# Patient Record
Sex: MALE | Race: WHITE | Employment: OTHER | URBAN - METROPOLITAN AREA
[De-identification: names, ages, dates, MRNs, and addresses within clinical notes are randomized per-mention and may not be internally consistent; named-entity substitution may affect disease eponyms.]

---

## 2020-01-31 ENCOUNTER — HOSPITAL ENCOUNTER (OUTPATIENT)
Dept: NON INVASIVE DIAGNOSTICS | Age: 65
Discharge: HOME | End: 2020-01-31
Attending: INTERNAL MEDICINE
Payer: COMMERCIAL

## 2020-01-31 VITALS
OXYGEN SATURATION: 93 % | BODY MASS INDEX: 42.66 KG/M2 | HEART RATE: 72 BPM | DIASTOLIC BLOOD PRESSURE: 73 MMHG | WEIGHT: 315 LBS | SYSTOLIC BLOOD PRESSURE: 113 MMHG | RESPIRATION RATE: 19 BRPM | HEIGHT: 72 IN

## 2020-01-31 DIAGNOSIS — I48.91 ATRIAL FIBRILLATION, UNSPECIFIED TYPE (HCC): ICD-10-CM

## 2020-01-31 LAB
ATRIAL RATE: 71 BPM
CALCULATED P AXIS, ECG09: 67 DEGREES
CALCULATED R AXIS, ECG10: 9 DEGREES
CALCULATED T AXIS, ECG11: 61 DEGREES
DIAGNOSIS, 93000: NORMAL
P-R INTERVAL, ECG05: 210 MS
Q-T INTERVAL, ECG07: 418 MS
QRS DURATION, ECG06: 100 MS
QTC CALCULATION (BEZET), ECG08: 454 MS
VENTRICULAR RATE, ECG03: 71 BPM

## 2020-01-31 PROCEDURE — 92960 CARDIOVERSION ELECTRIC EXT: CPT

## 2020-01-31 PROCEDURE — 77030018729 HC ELECTRD DEFIB PAD CARD -B

## 2020-01-31 PROCEDURE — 99152 MOD SED SAME PHYS/QHP 5/>YRS: CPT

## 2020-01-31 PROCEDURE — 74011250636 HC RX REV CODE- 250/636: Performed by: INTERNAL MEDICINE

## 2020-01-31 PROCEDURE — 93005 ELECTROCARDIOGRAM TRACING: CPT

## 2020-01-31 RX ORDER — FENTANYL CITRATE 50 UG/ML
12.5-5 INJECTION, SOLUTION INTRAMUSCULAR; INTRAVENOUS
Status: DISCONTINUED | OUTPATIENT
Start: 2020-01-31 | End: 2020-01-31

## 2020-01-31 RX ORDER — LISINOPRIL 40 MG/1
40 TABLET ORAL DAILY
COMMUNITY

## 2020-01-31 RX ORDER — MIDAZOLAM HYDROCHLORIDE 1 MG/ML
.5-2 INJECTION, SOLUTION INTRAMUSCULAR; INTRAVENOUS
Status: DISCONTINUED | OUTPATIENT
Start: 2020-01-31 | End: 2020-01-31

## 2020-01-31 RX ORDER — ALBUTEROL SULFATE 90 UG/1
2 AEROSOL, METERED RESPIRATORY (INHALATION)
COMMUNITY

## 2020-01-31 RX ORDER — NEBIVOLOL 10 MG/1
10 TABLET ORAL DAILY
Status: ON HOLD | COMMUNITY
End: 2022-10-20

## 2020-01-31 RX ORDER — DOXAZOSIN 2 MG/1
2 TABLET ORAL DAILY
COMMUNITY

## 2020-01-31 RX ADMIN — FENTANYL CITRATE 25 MCG: 50 INJECTION, SOLUTION INTRAMUSCULAR; INTRAVENOUS at 08:18

## 2020-01-31 RX ADMIN — MIDAZOLAM 1 MG: 1 INJECTION INTRAMUSCULAR; INTRAVENOUS at 08:18

## 2020-01-31 RX ADMIN — MIDAZOLAM 1 MG: 1 INJECTION INTRAMUSCULAR; INTRAVENOUS at 08:12

## 2020-01-31 RX ADMIN — FENTANYL CITRATE 25 MCG: 50 INJECTION, SOLUTION INTRAMUSCULAR; INTRAVENOUS at 08:11

## 2020-01-31 RX ADMIN — MIDAZOLAM 2 MG: 1 INJECTION INTRAMUSCULAR; INTRAVENOUS at 08:11

## 2020-01-31 RX ADMIN — MIDAZOLAM 2 MG: 1 INJECTION INTRAMUSCULAR; INTRAVENOUS at 08:14

## 2020-01-31 NOTE — DISCHARGE INSTRUCTIONS
DISCHARGE SUMMARY       The following personal items collected during your admission are returned to you:  Clothing:  shirt   Albuterol inhaler    PATIENT INSTRUCTIONS: Continue taking all the same medications as previously prescribed. Call if any problems with medications. The cardioversion procedure can cause redness to the skin where the patches were placed. You may treat this with Aloe or Cortisone cream over the counter lotion. If the skin appears very reddened or blistered contact your physician    May return to work Monday 2/3/2020. Call to make an appointment with Dr. Kayley Ivory in 2 weeks. What to do at Home:  Recommended activity: No driving today      The discharge information has been reviewed with the PATIENT/wife . The PATIENT  verbalized understanding.

## 2020-01-31 NOTE — PROGRESS NOTES
Pt sedated with 6mg Versed and 50mcg Fentanyl for Cardioversion, given 1 synchronized shock(s) at 360 Joules, Afib converted to NSR . (monitored sedation from 0810 to 0830)

## 2020-01-31 NOTE — PROGRESS NOTES
Patient arrived to Non-Invasive Cardiology Lab for Out Patient Cardioversion Procedure. Staff introduced to patient. Patient identifiers verified with Name and Date of Birth. Procedure verified with patient. Consent forms reviewed and signed by patient or authorized representative and verified. Allergies verified. Patient informed of procedure and plan of care. Questions answered with review. Patient on cardiac monitor, non-invasive blood pressure, SPO2 monitor. On 2L NC. Patient is A&Ox3. Patient reports no complaints. Patient on stretcher, in low position, with side rails up. Patient instructed to call for assistance as needed. Family in waiting room.

## 2022-02-11 ENCOUNTER — HOSPITAL ENCOUNTER (OUTPATIENT)
Dept: PREADMISSION TESTING | Age: 67
Discharge: HOME | End: 2022-02-11
Payer: MEDICARE

## 2022-02-11 LAB
SARS-COV-2, XPLCVT: NOT DETECTED
SOURCE, COVRS: NORMAL

## 2022-02-11 PROCEDURE — U0005 INFEC AGEN DETEC AMPLI PROBE: HCPCS

## 2022-02-15 ENCOUNTER — ANESTHESIA (OUTPATIENT)
Dept: CARDIAC CATH/INVASIVE PROCEDURES | Age: 67
End: 2022-02-15
Payer: MEDICARE

## 2022-02-15 ENCOUNTER — ANESTHESIA EVENT (OUTPATIENT)
Dept: CARDIAC CATH/INVASIVE PROCEDURES | Age: 67
End: 2022-02-15
Payer: MEDICARE

## 2022-02-15 ENCOUNTER — APPOINTMENT (OUTPATIENT)
Dept: CARDIAC CATH/INVASIVE PROCEDURES | Age: 67
End: 2022-02-15
Attending: INTERNAL MEDICINE
Payer: MEDICARE

## 2022-02-15 ENCOUNTER — HOSPITAL ENCOUNTER (OUTPATIENT)
Age: 67
Discharge: HOME | End: 2022-02-16
Attending: INTERNAL MEDICINE | Admitting: INTERNAL MEDICINE
Payer: MEDICARE

## 2022-02-15 DIAGNOSIS — I48.91 ATRIAL FIBRILLATION, UNSPECIFIED TYPE (HCC): ICD-10-CM

## 2022-02-15 PROBLEM — Z86.79 S/P ABLATION OF ATRIAL FIBRILLATION: Status: ACTIVE | Noted: 2022-02-15

## 2022-02-15 PROBLEM — Z98.890 S/P ABLATION OF ATRIAL FIBRILLATION: Status: ACTIVE | Noted: 2022-02-15

## 2022-02-15 LAB
ACT BLD: 142 SECS (ref 79–138)
ACT BLD: 220 SECS (ref 79–138)
ACT BLD: 243 SECS (ref 79–138)
ACT BLD: 261 SECS (ref 79–138)
ACT BLD: 267 SECS (ref 79–138)

## 2022-02-15 PROCEDURE — C1893 INTRO/SHEATH, FIXED,NON-PEEL: HCPCS | Performed by: INTERNAL MEDICINE

## 2022-02-15 PROCEDURE — 77030035291 HC TBNG PMP SMARTABLATE J&J -B: Performed by: INTERNAL MEDICINE

## 2022-02-15 PROCEDURE — 77030041009 HC ADTR CBL EP DX J&J -D: Performed by: INTERNAL MEDICINE

## 2022-02-15 PROCEDURE — 85347 COAGULATION TIME ACTIVATED: CPT

## 2022-02-15 PROCEDURE — C1894 INTRO/SHEATH, NON-LASER: HCPCS | Performed by: INTERNAL MEDICINE

## 2022-02-15 PROCEDURE — C1730 CATH, EP, 19 OR FEW ELECT: HCPCS | Performed by: INTERNAL MEDICINE

## 2022-02-15 PROCEDURE — C1759 CATH, INTRA ECHOCARDIOGRAPHY: HCPCS | Performed by: INTERNAL MEDICINE

## 2022-02-15 PROCEDURE — 74011250637 HC RX REV CODE- 250/637: Performed by: INTERNAL MEDICINE

## 2022-02-15 PROCEDURE — 77010033678 HC OXYGEN DAILY

## 2022-02-15 PROCEDURE — 77030026438 HC STYL ET INTUB CARD -A: Performed by: ANESTHESIOLOGY

## 2022-02-15 PROCEDURE — 93312 ECHO TRANSESOPHAGEAL: CPT

## 2022-02-15 PROCEDURE — 77030027107 HC PTCH EXT REF CARTO3 J&J -F: Performed by: INTERNAL MEDICINE

## 2022-02-15 PROCEDURE — 77030008684 HC TU ET CUF COVD -B: Performed by: ANESTHESIOLOGY

## 2022-02-15 PROCEDURE — 74011250636 HC RX REV CODE- 250/636: Performed by: INTERNAL MEDICINE

## 2022-02-15 PROCEDURE — 74011250636 HC RX REV CODE- 250/636: Performed by: NURSE ANESTHETIST, CERTIFIED REGISTERED

## 2022-02-15 PROCEDURE — 74011000258 HC RX REV CODE- 258: Performed by: NURSE ANESTHETIST, CERTIFIED REGISTERED

## 2022-02-15 PROCEDURE — 77030010880 HC CBL EP SUPRME STJU -C: Performed by: INTERNAL MEDICINE

## 2022-02-15 PROCEDURE — 77030005513 HC CATH URETH FOL11 MDII -B: Performed by: INTERNAL MEDICINE

## 2022-02-15 PROCEDURE — 93621 COMP EP EVL L PAC&REC C SINS: CPT | Performed by: INTERNAL MEDICINE

## 2022-02-15 PROCEDURE — 76210000006 HC OR PH I REC 0.5 TO 1 HR

## 2022-02-15 PROCEDURE — 76060000038 HC ANESTHESIA 3.5 TO 4 HR: Performed by: INTERNAL MEDICINE

## 2022-02-15 PROCEDURE — C1732 CATH, EP, DIAG/ABL, 3D/VECT: HCPCS | Performed by: INTERNAL MEDICINE

## 2022-02-15 PROCEDURE — 77030018729 HC ELECTRD DEFIB PAD CARD -B: Performed by: INTERNAL MEDICINE

## 2022-02-15 PROCEDURE — 2709999900 HC NON-CHARGEABLE SUPPLY: Performed by: INTERNAL MEDICINE

## 2022-02-15 PROCEDURE — 74011000250 HC RX REV CODE- 250: Performed by: INTERNAL MEDICINE

## 2022-02-15 PROCEDURE — 77030020506 HC NDL TRNSPTL NRG BAYL -F: Performed by: INTERNAL MEDICINE

## 2022-02-15 PROCEDURE — 77030029359 HC PRB ESOPH TEMP CATH ANTM -F: Performed by: INTERNAL MEDICINE

## 2022-02-15 PROCEDURE — 77030021678 HC GLIDESCP STAT DISP VERT -B: Performed by: ANESTHESIOLOGY

## 2022-02-15 PROCEDURE — 93656 COMPRE EP EVAL ABLTJ ATR FIB: CPT | Performed by: INTERNAL MEDICINE

## 2022-02-15 PROCEDURE — 74011000250 HC RX REV CODE- 250: Performed by: NURSE ANESTHETIST, CERTIFIED REGISTERED

## 2022-02-15 PROCEDURE — 77030013797 HC KT TRNSDUC PRSSR EDWD -A: Performed by: INTERNAL MEDICINE

## 2022-02-15 PROCEDURE — 77030013079 HC BLNKT BAIR HGGR 3M -A: Performed by: ANESTHESIOLOGY

## 2022-02-15 RX ORDER — HYDROMORPHONE HYDROCHLORIDE 2 MG/ML
0.2 INJECTION, SOLUTION INTRAMUSCULAR; INTRAVENOUS; SUBCUTANEOUS
Status: DISCONTINUED | OUTPATIENT
Start: 2022-02-15 | End: 2022-02-15 | Stop reason: HOSPADM

## 2022-02-15 RX ORDER — PROPOFOL 10 MG/ML
INJECTION, EMULSION INTRAVENOUS AS NEEDED
Status: DISCONTINUED | OUTPATIENT
Start: 2022-02-15 | End: 2022-02-15 | Stop reason: HOSPADM

## 2022-02-15 RX ORDER — FENTANYL CITRATE 50 UG/ML
25 INJECTION, SOLUTION INTRAMUSCULAR; INTRAVENOUS
Status: DISCONTINUED | OUTPATIENT
Start: 2022-02-15 | End: 2022-02-15 | Stop reason: HOSPADM

## 2022-02-15 RX ORDER — HEPARIN SODIUM 200 [USP'U]/100ML
INJECTION, SOLUTION INTRAVENOUS
Status: COMPLETED | OUTPATIENT
Start: 2022-02-15 | End: 2022-02-15

## 2022-02-15 RX ORDER — DEXAMETHASONE SODIUM PHOSPHATE 4 MG/ML
INJECTION, SOLUTION INTRA-ARTICULAR; INTRALESIONAL; INTRAMUSCULAR; INTRAVENOUS; SOFT TISSUE AS NEEDED
Status: DISCONTINUED | OUTPATIENT
Start: 2022-02-15 | End: 2022-02-15 | Stop reason: HOSPADM

## 2022-02-15 RX ORDER — GLYCOPYRROLATE 0.2 MG/ML
INJECTION INTRAMUSCULAR; INTRAVENOUS AS NEEDED
Status: DISCONTINUED | OUTPATIENT
Start: 2022-02-15 | End: 2022-02-15 | Stop reason: HOSPADM

## 2022-02-15 RX ORDER — SODIUM CHLORIDE 0.9 % (FLUSH) 0.9 %
5-40 SYRINGE (ML) INJECTION AS NEEDED
Status: DISCONTINUED | OUTPATIENT
Start: 2022-02-15 | End: 2022-02-16 | Stop reason: HOSPADM

## 2022-02-15 RX ORDER — PANTOPRAZOLE SODIUM 40 MG/1
40 TABLET, DELAYED RELEASE ORAL
Status: DISCONTINUED | OUTPATIENT
Start: 2022-02-16 | End: 2022-02-16 | Stop reason: HOSPADM

## 2022-02-15 RX ORDER — ROCURONIUM BROMIDE 10 MG/ML
INJECTION, SOLUTION INTRAVENOUS AS NEEDED
Status: DISCONTINUED | OUTPATIENT
Start: 2022-02-15 | End: 2022-02-15 | Stop reason: HOSPADM

## 2022-02-15 RX ORDER — SODIUM CHLORIDE 0.9 % (FLUSH) 0.9 %
5-40 SYRINGE (ML) INJECTION EVERY 8 HOURS
Status: DISCONTINUED | OUTPATIENT
Start: 2022-02-15 | End: 2022-02-16 | Stop reason: HOSPADM

## 2022-02-15 RX ORDER — LIDOCAINE HYDROCHLORIDE 20 MG/ML
INJECTION, SOLUTION EPIDURAL; INFILTRATION; INTRACAUDAL; PERINEURAL AS NEEDED
Status: DISCONTINUED | OUTPATIENT
Start: 2022-02-15 | End: 2022-02-15 | Stop reason: HOSPADM

## 2022-02-15 RX ORDER — ONDANSETRON 2 MG/ML
INJECTION INTRAMUSCULAR; INTRAVENOUS AS NEEDED
Status: DISCONTINUED | OUTPATIENT
Start: 2022-02-15 | End: 2022-02-15 | Stop reason: HOSPADM

## 2022-02-15 RX ORDER — METOPROLOL TARTRATE 5 MG/5ML
2.5 INJECTION INTRAVENOUS
Status: DISCONTINUED | OUTPATIENT
Start: 2022-02-15 | End: 2022-02-16 | Stop reason: HOSPADM

## 2022-02-15 RX ORDER — MIDAZOLAM HYDROCHLORIDE 1 MG/ML
INJECTION, SOLUTION INTRAMUSCULAR; INTRAVENOUS AS NEEDED
Status: DISCONTINUED | OUTPATIENT
Start: 2022-02-15 | End: 2022-02-15 | Stop reason: HOSPADM

## 2022-02-15 RX ORDER — SUCCINYLCHOLINE CHLORIDE 20 MG/ML
INJECTION INTRAMUSCULAR; INTRAVENOUS AS NEEDED
Status: DISCONTINUED | OUTPATIENT
Start: 2022-02-15 | End: 2022-02-15 | Stop reason: HOSPADM

## 2022-02-15 RX ORDER — SODIUM CHLORIDE 9 MG/ML
INJECTION, SOLUTION INTRAVENOUS
Status: DISCONTINUED | OUTPATIENT
Start: 2022-02-15 | End: 2022-02-15 | Stop reason: HOSPADM

## 2022-02-15 RX ORDER — SODIUM CHLORIDE 0.9 % (FLUSH) 0.9 %
5-40 SYRINGE (ML) INJECTION AS NEEDED
Status: DISCONTINUED | OUTPATIENT
Start: 2022-02-15 | End: 2022-02-15 | Stop reason: HOSPADM

## 2022-02-15 RX ORDER — ALBUTEROL SULFATE 0.83 MG/ML
2.5 SOLUTION RESPIRATORY (INHALATION)
Status: DISCONTINUED | OUTPATIENT
Start: 2022-02-15 | End: 2022-02-16 | Stop reason: HOSPADM

## 2022-02-15 RX ORDER — SODIUM CHLORIDE 0.9 % (FLUSH) 0.9 %
5-40 SYRINGE (ML) INJECTION EVERY 8 HOURS
Status: DISCONTINUED | OUTPATIENT
Start: 2022-02-15 | End: 2022-02-15 | Stop reason: HOSPADM

## 2022-02-15 RX ORDER — HEPARIN SODIUM 1000 [USP'U]/ML
INJECTION, SOLUTION INTRAVENOUS; SUBCUTANEOUS AS NEEDED
Status: DISCONTINUED | OUTPATIENT
Start: 2022-02-15 | End: 2022-02-15 | Stop reason: HOSPADM

## 2022-02-15 RX ORDER — SODIUM CHLORIDE 0.9 % (FLUSH) 0.9 %
5-40 SYRINGE (ML) INJECTION AS NEEDED
Status: CANCELLED | OUTPATIENT
Start: 2022-02-15

## 2022-02-15 RX ORDER — FENTANYL CITRATE 50 UG/ML
INJECTION, SOLUTION INTRAMUSCULAR; INTRAVENOUS AS NEEDED
Status: DISCONTINUED | OUTPATIENT
Start: 2022-02-15 | End: 2022-02-15 | Stop reason: HOSPADM

## 2022-02-15 RX ORDER — HYDROCODONE BITARTRATE AND ACETAMINOPHEN 5; 325 MG/1; MG/1
1 TABLET ORAL
Status: DISCONTINUED | OUTPATIENT
Start: 2022-02-15 | End: 2022-02-16 | Stop reason: HOSPADM

## 2022-02-15 RX ORDER — EPHEDRINE SULFATE/0.9% NACL/PF 50 MG/5 ML
SYRINGE (ML) INTRAVENOUS AS NEEDED
Status: DISCONTINUED | OUTPATIENT
Start: 2022-02-15 | End: 2022-02-15 | Stop reason: HOSPADM

## 2022-02-15 RX ORDER — PROTAMINE SULFATE 10 MG/ML
INJECTION, SOLUTION INTRAVENOUS AS NEEDED
Status: DISCONTINUED | OUTPATIENT
Start: 2022-02-15 | End: 2022-02-15 | Stop reason: HOSPADM

## 2022-02-15 RX ORDER — SUCRALFATE 1 G/1
1 TABLET ORAL 3 TIMES DAILY
Status: DISCONTINUED | OUTPATIENT
Start: 2022-02-15 | End: 2022-02-16 | Stop reason: HOSPADM

## 2022-02-15 RX ORDER — SODIUM CHLORIDE, SODIUM LACTATE, POTASSIUM CHLORIDE, CALCIUM CHLORIDE 600; 310; 30; 20 MG/100ML; MG/100ML; MG/100ML; MG/100ML
25 INJECTION, SOLUTION INTRAVENOUS CONTINUOUS
Status: DISCONTINUED | OUTPATIENT
Start: 2022-02-15 | End: 2022-02-15 | Stop reason: HOSPADM

## 2022-02-15 RX ORDER — PHENYLEPHRINE HCL IN 0.9% NACL 0.4MG/10ML
SYRINGE (ML) INTRAVENOUS AS NEEDED
Status: DISCONTINUED | OUTPATIENT
Start: 2022-02-15 | End: 2022-02-15 | Stop reason: HOSPADM

## 2022-02-15 RX ORDER — LISINOPRIL 20 MG/1
40 TABLET ORAL DAILY
Status: DISCONTINUED | OUTPATIENT
Start: 2022-02-16 | End: 2022-02-16 | Stop reason: HOSPADM

## 2022-02-15 RX ORDER — DIPHENHYDRAMINE HYDROCHLORIDE 50 MG/ML
12.5 INJECTION, SOLUTION INTRAMUSCULAR; INTRAVENOUS AS NEEDED
Status: DISCONTINUED | OUTPATIENT
Start: 2022-02-15 | End: 2022-02-15 | Stop reason: HOSPADM

## 2022-02-15 RX ORDER — AMIODARONE HYDROCHLORIDE 150 MG/3ML
INJECTION, SOLUTION INTRAVENOUS AS NEEDED
Status: DISCONTINUED | OUTPATIENT
Start: 2022-02-15 | End: 2022-02-15 | Stop reason: HOSPADM

## 2022-02-15 RX ORDER — DOXAZOSIN 2 MG/1
2 TABLET ORAL DAILY
Status: DISCONTINUED | OUTPATIENT
Start: 2022-02-16 | End: 2022-02-16 | Stop reason: HOSPADM

## 2022-02-15 RX ORDER — ACETAMINOPHEN 325 MG/1
650 TABLET ORAL
Status: DISCONTINUED | OUTPATIENT
Start: 2022-02-15 | End: 2022-02-16 | Stop reason: HOSPADM

## 2022-02-15 RX ORDER — NEOSTIGMINE METHYLSULFATE 1 MG/ML
INJECTION, SOLUTION INTRAVENOUS AS NEEDED
Status: DISCONTINUED | OUTPATIENT
Start: 2022-02-15 | End: 2022-02-15 | Stop reason: HOSPADM

## 2022-02-15 RX ORDER — METOPROLOL TARTRATE 25 MG/1
25 TABLET, FILM COATED ORAL 2 TIMES DAILY
Status: DISCONTINUED | OUTPATIENT
Start: 2022-02-15 | End: 2022-02-16 | Stop reason: HOSPADM

## 2022-02-15 RX ORDER — SODIUM CHLORIDE 0.9 % (FLUSH) 0.9 %
5-40 SYRINGE (ML) INJECTION EVERY 8 HOURS
Status: CANCELLED | OUTPATIENT
Start: 2022-02-15

## 2022-02-15 RX ORDER — LIDOCAINE HYDROCHLORIDE 10 MG/ML
0.1 INJECTION, SOLUTION EPIDURAL; INFILTRATION; INTRACAUDAL; PERINEURAL AS NEEDED
Status: CANCELLED | OUTPATIENT
Start: 2022-02-15

## 2022-02-15 RX ADMIN — Medication 200 MCG: at 13:14

## 2022-02-15 RX ADMIN — Medication 200 MCG: at 15:35

## 2022-02-15 RX ADMIN — GLYCOPYRROLATE 0.6 MG: 0.2 INJECTION, SOLUTION INTRAMUSCULAR; INTRAVENOUS at 16:05

## 2022-02-15 RX ADMIN — HEPARIN SODIUM 14000 UNITS: 1000 INJECTION, SOLUTION INTRAVENOUS; SUBCUTANEOUS at 14:02

## 2022-02-15 RX ADMIN — Medication 200 MCG: at 13:11

## 2022-02-15 RX ADMIN — PROPOFOL 30 MG: 10 INJECTION, EMULSION INTRAVENOUS at 15:17

## 2022-02-15 RX ADMIN — Medication 200 MCG: at 13:43

## 2022-02-15 RX ADMIN — Medication 10 MG: at 14:06

## 2022-02-15 RX ADMIN — METOPROLOL TARTRATE 25 MG: 25 TABLET, FILM COATED ORAL at 20:46

## 2022-02-15 RX ADMIN — Medication 200 MCG: at 13:21

## 2022-02-15 RX ADMIN — SODIUM CHLORIDE: 9 INJECTION, SOLUTION INTRAVENOUS at 12:55

## 2022-02-15 RX ADMIN — Medication 200 MCG: at 13:17

## 2022-02-15 RX ADMIN — APIXABAN 2.5 MG: 2.5 TABLET, FILM COATED ORAL at 20:46

## 2022-02-15 RX ADMIN — HEPARIN SODIUM 7000 UNITS: 1000 INJECTION, SOLUTION INTRAVENOUS; SUBCUTANEOUS at 14:22

## 2022-02-15 RX ADMIN — SUCRALFATE 1 G: 1 TABLET ORAL at 20:47

## 2022-02-15 RX ADMIN — FENTANYL CITRATE 50 MCG: 50 INJECTION, SOLUTION INTRAMUSCULAR; INTRAVENOUS at 13:01

## 2022-02-15 RX ADMIN — PROPOFOL 10 MG: 10 INJECTION, EMULSION INTRAVENOUS at 15:43

## 2022-02-15 RX ADMIN — PHENYLEPHRINE HYDROCHLORIDE 80 MCG/MIN: 10 INJECTION INTRAVENOUS at 13:06

## 2022-02-15 RX ADMIN — PROPOFOL 200 MG: 10 INJECTION, EMULSION INTRAVENOUS at 13:01

## 2022-02-15 RX ADMIN — HEPARIN SODIUM 5000 UNITS: 1000 INJECTION, SOLUTION INTRAVENOUS; SUBCUTANEOUS at 14:45

## 2022-02-15 RX ADMIN — Medication 80 MCG: at 13:09

## 2022-02-15 RX ADMIN — MIDAZOLAM HYDROCHLORIDE 1 MG: 1 INJECTION, SOLUTION INTRAMUSCULAR; INTRAVENOUS at 12:58

## 2022-02-15 RX ADMIN — Medication 10 MG: at 13:12

## 2022-02-15 RX ADMIN — ROCURONIUM BROMIDE 10 MG: 10 INJECTION INTRAVENOUS at 13:01

## 2022-02-15 RX ADMIN — Medication 3 MG: at 16:05

## 2022-02-15 RX ADMIN — Medication 200 MCG: at 13:32

## 2022-02-15 RX ADMIN — Medication 10 MG: at 13:28

## 2022-02-15 RX ADMIN — Medication 120 MCG: at 13:07

## 2022-02-15 RX ADMIN — ONDANSETRON HYDROCHLORIDE 4 MG: 2 INJECTION, SOLUTION INTRAMUSCULAR; INTRAVENOUS at 15:37

## 2022-02-15 RX ADMIN — PROTAMINE SULFATE 70 MG: 10 INJECTION, SOLUTION INTRAVENOUS at 15:37

## 2022-02-15 RX ADMIN — DEXAMETHASONE SODIUM PHOSPHATE 4 MG: 4 INJECTION, SOLUTION INTRAMUSCULAR; INTRAVENOUS at 13:15

## 2022-02-15 RX ADMIN — HEPARIN SODIUM 3000 UNITS: 1000 INJECTION, SOLUTION INTRAVENOUS; SUBCUTANEOUS at 15:08

## 2022-02-15 RX ADMIN — LIDOCAINE HYDROCHLORIDE 100 MG: 20 INJECTION, SOLUTION INTRAVENOUS at 13:01

## 2022-02-15 RX ADMIN — SUCCINYLCHOLINE CHLORIDE 200 MG: 20 INJECTION, SOLUTION INTRAMUSCULAR; INTRAVENOUS at 13:01

## 2022-02-15 RX ADMIN — SODIUM CHLORIDE, PRESERVATIVE FREE 10 ML: 5 INJECTION INTRAVENOUS at 20:46

## 2022-02-15 RX ADMIN — ROCURONIUM BROMIDE 40 MG: 10 INJECTION INTRAVENOUS at 13:41

## 2022-02-15 RX ADMIN — AMIODARONE HYDROCHLORIDE 150 MG: 50 INJECTION, SOLUTION INTRAVENOUS at 15:30

## 2022-02-15 RX ADMIN — Medication 200 MCG: at 13:27

## 2022-02-15 NOTE — ANESTHESIA POSTPROCEDURE EVALUATION
Procedure(s):  ABLATION A-FIB  W COMPLETE EP STUDY. general    Anesthesia Post Evaluation      Multimodal analgesia: multimodal analgesia used between 6 hours prior to anesthesia start to PACU discharge  Patient location during evaluation: PACU  Patient participation: complete - patient participated  Level of consciousness: sleepy but conscious and responsive to verbal stimuli  Pain score: 2  Pain management: adequate  Airway patency: patent  Anesthetic complications: no  Cardiovascular status: acceptable  Respiratory status: acceptable  Hydration status: acceptable  Comments: +Post-Anesthesia Evaluation and Assessment    Patient: Gabriela Montgomery MRN: [id number]  SSN: [id number]   YOB: 1955  Age: 73 y.o. Sex: male      Cardiovascular Function/Vital Signs    /69 (BP 1 Location: Left upper arm, BP Patient Position: At rest)   Pulse (!) 125   Temp 37.1 °C (98.7 °F)   Resp 17   Ht 6' (1.829 m)   Wt (!) 158.8 kg (350 lb 1.5 oz)   SpO2 94%   BMI 47.48 kg/m²     Patient is status post Procedure(s):  ABLATION A-FIB  W COMPLETE EP STUDY. Nausea/Vomiting: Controlled. Postoperative hydration reviewed and adequate. Pain:  Pain Scale 1: Numeric (0 - 10) (02/15/22 1645)  Pain Intensity 1: 0 (02/15/22 1645)   Managed. Neurological Status:   Neuro (WDL): Within Defined Limits (02/15/22 1620)   At baseline. Mental Status and Level of Consciousness: Arousable. Pulmonary Status:   O2 Device: Nasal cannula (02/15/22 1700)   Adequate oxygenation and airway patent. Complications related to anesthesia: None    Post-anesthesia assessment completed. No concerns.     Signed By: Leesa Myers MD    2/15/2022  Post anesthesia nausea and vomiting:  controlled  Final Post Anesthesia Temperature Assessment:  Normothermia (36.0-37.5 degrees C)      INITIAL Post-op Vital signs:   Vitals Value Taken Time   /69 02/15/22 1645   Temp 37.1 °C (98.7 °F) 02/15/22 1626   Pulse 118 02/15/22 1700   Resp 18 02/15/22 1700   SpO2 95 % 02/15/22 1700   Vitals shown include unvalidated device data.

## 2022-02-15 NOTE — Clinical Note
Transseptal Cath Performed under hemodynamic and ICE and Fluoro, [place] 98cm via a guiding sheath. Needle inserted.

## 2022-02-15 NOTE — PERIOP NOTES
Handoff Report from Operating Room to PACU    Report received from [address] [place] [address] and [facility] regarding Patricia Brar. Surgeon(s):  Anesthesia, Case  And Procedure(s) (LRB):  SPECIAL PROCEDURE OUTSIDE OF OR (N/A)  confirmed   with allergies and dressings discussed. Anesthesia type, drugs, patient history, complications, estimated blood loss, vital signs, intake and output, and last pain medication, lines, reversal medications and temperature were reviewed. 1705- TRANSFER - OUT REPORT:    Verbal report given to Vicenta RUTLEDGE(name) on Patricia Brar  being transferred to 2153(unit) for routine post - op       Report consisted of patients Situation, Background, Assessment and   Recommendations(SBAR). Information from the following report(s) OR Summary, Procedure Summary, Intake/Output and MAR was reviewed with the receiving nurse. Lines:   Peripheral IV 02/15/22 Right Antecubital (Active)   Site Assessment Clean, dry, & intact 02/15/22 1650   Phlebitis Assessment 0 02/15/22 1650   Infiltration Assessment 0 02/15/22 1650   Dressing Status Clean, dry, & intact 02/15/22 1650   Dressing Type Tape;Transparent 02/15/22 1650   Hub Color/Line Status Pink;Capped 02/15/22 1650        Opportunity for questions and clarification was provided.       Patient transported with:   Monitor  O2 @ 3l liters  Registered Nurse  [facility]

## 2022-02-15 NOTE — ANESTHESIA PREPROCEDURE EVALUATION
Relevant Problems   No relevant active problems       Anesthetic History   No history of anesthetic complications            Review of Systems / Medical History  Patient summary reviewed, nursing notes reviewed and pertinent labs reviewed    Pulmonary            Asthma : well controlled       Neuro/Psych   Within defined limits           Cardiovascular    Hypertension        Dysrhythmias : atrial fibrillation      Exercise tolerance: >4 METS  Comments: ECG (1/31/20): Sinus rhythm with 1st degree AV block   When compared with ECG of 16-MAR-2010 12:22,   IL interval has increased   GI/Hepatic/Renal  Within defined limits              Endo/Other        Morbid obesity    Comments: Hx Prostate Cancer    Hx Left hip arthroplasty Other Findings            Physical Exam    Airway  Mallampati: III  TM Distance: > 6 cm  Neck ROM: normal range of motion   Mouth opening: Normal    Comments: Patient has long beard Cardiovascular  Regular rate and rhythm,  S1 and S2 normal,  no murmur, click, rub, or gallop             Dental    Dentition: Caps/crowns  Comments: Multiple missing teeth, none loose.    Pulmonary  Breath sounds clear to auscultation               Abdominal  GI exam deferred       Other Findings            Anesthetic Plan    ASA: 3  Anesthesia type: general    Monitoring Plan: BIS      Induction: Intravenous  Anesthetic plan and risks discussed with: Patient

## 2022-02-15 NOTE — PROGRESS NOTES
Cardiac Cath Lab Recovery Arrival Note:      Adriana Centeno arrived to [facility] 2153. Staff introduced to patient. Patient identifiers verified with NAME and DATE OF BIRTH. Procedure verified with patient. Consent forms reviewed and signed by patient or authorized representative and verified. Allergies verified. Patient and family oriented to department. Patient and family informed of procedure and plan of care. Questions answered with review. Patient prepped for procedure, per orders from physician, prior to arrival.    Patient on cardiac monitor, non-invasive blood pressure, SPO2 monitor. On RA. Patient is A&Ox 4. Patient reports no complaints. Patient in stretcher, in low position, with side rails up, call bell within reach, patient instructed to call if assistance as needed. Patient prep in: [facility] 2153. Patient family has pager # none  Family in: wife neeru in room.    Prep by: Pawan Tolbert

## 2022-02-15 NOTE — Clinical Note
TRANSFER - OUT REPORT:     Verbal report given to: PACU-. Report consisted of patient's Situation, Background, Assessment and   Recommendations(SBAR). Opportunity for questions and clarification was provided. Patient transported with a Registered Nurse and [address] [place] [address] / [facility].

## 2022-02-16 VITALS
HEIGHT: 72 IN | HEART RATE: 92 BPM | DIASTOLIC BLOOD PRESSURE: 87 MMHG | OXYGEN SATURATION: 95 % | TEMPERATURE: 98.6 F | SYSTOLIC BLOOD PRESSURE: 125 MMHG | RESPIRATION RATE: 20 BRPM | WEIGHT: 315 LBS | BODY MASS INDEX: 42.66 KG/M2

## 2022-02-16 PROCEDURE — 74011250637 HC RX REV CODE- 250/637: Performed by: INTERNAL MEDICINE

## 2022-02-16 PROCEDURE — 74011000250 HC RX REV CODE- 250: Performed by: INTERNAL MEDICINE

## 2022-02-16 RX ORDER — PANTOPRAZOLE SODIUM 40 MG/1
40 TABLET, DELAYED RELEASE ORAL
Qty: 30 TABLET | Refills: 0 | Status: ON HOLD | OUTPATIENT
Start: 2022-02-17 | End: 2022-09-08

## 2022-02-16 RX ORDER — SUCRALFATE 1 G/1
1 TABLET ORAL 3 TIMES DAILY
Qty: 90 TABLET | Refills: 0 | Status: ON HOLD | OUTPATIENT
Start: 2022-02-16 | End: 2022-09-08

## 2022-02-16 RX ADMIN — METOPROLOL TARTRATE 25 MG: 25 TABLET, FILM COATED ORAL at 08:57

## 2022-02-16 RX ADMIN — DOXAZOSIN 2 MG: 2 TABLET ORAL at 09:02

## 2022-02-16 RX ADMIN — PANTOPRAZOLE SODIUM 40 MG: 40 TABLET, DELAYED RELEASE ORAL at 08:57

## 2022-02-16 RX ADMIN — APIXABAN 5 MG: 5 TABLET, FILM COATED ORAL at 08:57

## 2022-02-16 RX ADMIN — LISINOPRIL 40 MG: 20 TABLET ORAL at 08:57

## 2022-02-16 RX ADMIN — SUCRALFATE 1 G: 1 TABLET ORAL at 08:57

## 2022-02-16 RX ADMIN — SODIUM CHLORIDE, PRESERVATIVE FREE 10 ML: 5 INJECTION INTRAVENOUS at 05:12

## 2022-02-16 NOTE — PROGRESS NOTES
1930 - Bedside report from [facility]. Afib in 120-130's. No complaints. Groins benign. Will leave peraza in overnight per Dr. Molly Clarke. 2045 - Metoprolol PO as ordered. HR is in 100-110's currently. Up to chair. 2245 - HR in  range, still afib. Walked in hallway without difficulty or complaints. Small bruise R groin, soft. Mild headache, declining tylenol at this time. - Bedside report to RN.

## 2022-02-16 NOTE — PROGRESS NOTES
Brief Procedure Note    Patient: Ankur Navarro MRN: [id number]  SSN: [id number]    YOB: 1955  Age: 73 y.o. Sex: male      Date of Procedure: 2/15/2022     Pre-procedure Diagnosis:  Persistent atrial fibrillation    Post-procedure Diagnosis: Same    Procedure: PVI with posterior box lesion set    Performed By: Amberly Eagle MD     Anesthesia: General    Estimated Blood Loss: Less than 50 mL      Specimens:  None    Findings: See note    Complications: None    Implants: None    Recommendations:  Refractory atrial fibrillation, routine post-procedure management.     Signed By: Amberly Eagle MD     February 15, 2022

## 2022-02-16 NOTE — PROGRESS NOTES
0700 Pt is alert and oriented. Obained VS. VSS. Pt complains of no pain. Cath entry sites (groin) are CDI with no bruising. 0945 Pts IV's are discontinued. Discussed discharge paperwork with patient and at this moment in time pt has no further questions in regards to discharge information.

## 2022-02-16 NOTE — PROGRESS NOTES
Doing well post-ablation. Still in Afib. We discussed the procedure result. He was cardioverted 5x during his procedure and still unable to get out of Afib. All questions answered for him. He is aware of s/sx warranting urgent medical f/u or calling 911. F/U in 1 month in the office. PPI and carafate x 1 month.     Patient Vitals for the past 24 hrs:   Temp Pulse Resp BP SpO2   02/16/22 0700 98.6 °F (37 °C) 92 20 125/87 95 %   02/16/22 0455 97.9 °F (36.6 °C) 89 18 124/65 95 %   02/15/22 2300 -- 95 20 -- 95 %   02/15/22 2234 97.9 °F (36.6 °C) 83 22 130/78 93 %   02/15/22 2200 -- (!) 107 20 (!) 168/82 94 %   02/15/22 2100 -- 100 20 124/87 94 %   02/15/22 2000 -- (!) 120 25 115/68 92 %   02/15/22 1900 98.5 °F (36.9 °C) (!) 125 20 123/78 96 %   02/15/22 1830 -- (!) 123 30 109/70 93 %   02/15/22 1815 -- (!) 118 20 104/83 92 %   02/15/22 1800 -- (!) 134 23 (!) 76/55 94 %   02/15/22 1730 -- (!) 139 17 105/63 95 %   02/15/22 1718 98.1 °F (36.7 °C) (!) 128 8 102/73 95 %   02/15/22 1700 98.7 °F (37.1 °C) (!) 125 17 (!) 120/57 94 %   02/15/22 1645 -- (!) 133 19 118/69 95 %   02/15/22 1630 -- (!) 128 21 120/82 95 %   02/15/22 1626 98.7 °F (37.1 °C) (!) 125 22 125/67 97 %   02/15/22 1625 -- (!) 115 17 114/65 97 %   02/15/22 1620 -- (!) 120 17 126/67 97 %   02/15/22 1359 -- -- -- (!) 138/97 --   02/15/22 1020 -- (!) 105 24 (!) 138/97 94 %   02/15/22 1000 99.2 °F (37.3 °C) (!) 106 17 (!) 160/111 96 %         Signed By: Scar Grant MD     February 16, 2022

## 2022-02-16 NOTE — DISCHARGE INSTRUCTIONS
POST-ABLATION DISCHARGE INSTRUCTIONS:    You had an atrial fibrillation ablation using RF energy yesterday with Dr. Jenny Chanel. Please call to make an appointment with Dr. Jenny Chanel (or the nurse practitioner) in one month [phone]. The importance of the two new medications that you will take for one month were discussed. These medications will protect your esophagus in case any heat injury occurred during the ablation. Do not drive, operate any machinery, or sign any legal documents for 24 hours after your procedure. You must have someone to drive you home. You may take a shower 24 hours after your cardiac procedure. Be sure to get the dressing wet and then remove it; gently wash the area with warm soapy water. Pat dry and leave open to air. To help prevent infections, be sure to keep the cath site clean and dry. No lotions, creams, powders, ointments, etc. in the cath site for approximately 1 week.  Do not take a tub bath, get in a hot tub or swimming pool for approximately 5 days or until the cath site is completely healed.  No strenuous activity or heavy lifting over 20 lbs. for 7 days.  After your procedure, some bruising or discomfort is common during the healing process. Tylenol, 1-2 tablets every 6 hours as needed, is recommended if you experience any discomfort. If you experience any signs or symptoms of infection such as fever, chills, or poorly healing incision, persistent tenderness or swelling in the groin, redness and/or warmth to the touch, numbness, significant tingling or pain at the groin site or affected extremity, rash, drainage from the site, or if the leg feels tight or swollen, call your physician right away.  If bleeding at the site occurs, take a clean gauze pad and apply direct pressure to the groin just above the puncture site, and call your physician right away.      If your procedure involved ablation therapy, you may feel some mild or vague chest discomfort due to delivery of heat therapy to the heart muscle. This should resolve in 1-2 days. If it gets worse or is associated with shortness of breath, dizziness, loss of consciousness, call your physician right away or call 911 if emergency medical care is needed.     Signed By: Home Nieves MD     February 16, 2022

## 2022-03-19 PROBLEM — Z98.890 S/P ABLATION OF ATRIAL FIBRILLATION: Status: ACTIVE | Noted: 2022-02-15

## 2022-03-19 PROBLEM — Z86.79 S/P ABLATION OF ATRIAL FIBRILLATION: Status: ACTIVE | Noted: 2022-02-15

## 2022-09-08 ENCOUNTER — APPOINTMENT (OUTPATIENT)
Dept: CARDIAC CATH/INVASIVE PROCEDURES | Age: 67
End: 2022-09-08
Attending: INTERNAL MEDICINE
Payer: MEDICARE

## 2022-09-08 ENCOUNTER — ANESTHESIA (OUTPATIENT)
Dept: CARDIAC CATH/INVASIVE PROCEDURES | Age: 67
End: 2022-09-08
Payer: MEDICARE

## 2022-09-08 ENCOUNTER — ANESTHESIA EVENT (OUTPATIENT)
Dept: CARDIAC CATH/INVASIVE PROCEDURES | Age: 67
End: 2022-09-08
Payer: MEDICARE

## 2022-09-08 ENCOUNTER — HOSPITAL ENCOUNTER (OUTPATIENT)
Age: 67
Setting detail: OBSERVATION
Discharge: HOME | End: 2022-09-08
Attending: INTERNAL MEDICINE | Admitting: INTERNAL MEDICINE
Payer: MEDICARE

## 2022-09-08 VITALS
RESPIRATION RATE: 18 BRPM | HEART RATE: 115 BPM | DIASTOLIC BLOOD PRESSURE: 79 MMHG | WEIGHT: 315 LBS | SYSTOLIC BLOOD PRESSURE: 136 MMHG | TEMPERATURE: 98.5 F | BODY MASS INDEX: 42.66 KG/M2 | HEIGHT: 72 IN | OXYGEN SATURATION: 93 %

## 2022-09-08 DIAGNOSIS — I48.91 ATRIAL FIBRILLATION, UNSPECIFIED TYPE (HCC): ICD-10-CM

## 2022-09-08 PROBLEM — I97.88 HYPOTENSION DURING SURGERY: Status: ACTIVE | Noted: 2022-09-08

## 2022-09-08 PROBLEM — I95.89 HYPOTENSION DURING SURGERY: Status: ACTIVE | Noted: 2022-09-08

## 2022-09-08 PROCEDURE — 93621 COMP EP EVL L PAC&REC C SINS: CPT | Performed by: INTERNAL MEDICINE

## 2022-09-08 PROCEDURE — 77030029359 HC PRB ESOPH TEMP CATH ANTM -F: Performed by: INTERNAL MEDICINE

## 2022-09-08 PROCEDURE — G0378 HOSPITAL OBSERVATION PER HR: HCPCS

## 2022-09-08 PROCEDURE — 77030013797 HC KT TRNSDUC PRSSR EDWD -A: Performed by: ANESTHESIOLOGY

## 2022-09-08 PROCEDURE — 93312 ECHO TRANSESOPHAGEAL: CPT

## 2022-09-08 PROCEDURE — 77030026438 HC STYL ET INTUB CARD -A: Performed by: ANESTHESIOLOGY

## 2022-09-08 PROCEDURE — 74011250636 HC RX REV CODE- 250/636: Performed by: NURSE ANESTHETIST, CERTIFIED REGISTERED

## 2022-09-08 PROCEDURE — C1759 CATH, INTRA ECHOCARDIOGRAPHY: HCPCS | Performed by: INTERNAL MEDICINE

## 2022-09-08 PROCEDURE — 2709999900 HC NON-CHARGEABLE SUPPLY: Performed by: INTERNAL MEDICINE

## 2022-09-08 PROCEDURE — 77010033678 HC OXYGEN DAILY

## 2022-09-08 PROCEDURE — 77030008771 HC TU NG SALEM SUMP -A: Performed by: ANESTHESIOLOGY

## 2022-09-08 PROCEDURE — C1732 CATH, EP, DIAG/ABL, 3D/VECT: HCPCS | Performed by: INTERNAL MEDICINE

## 2022-09-08 PROCEDURE — 77030041009 HC ADTR CBL EP DX J&J -D: Performed by: INTERNAL MEDICINE

## 2022-09-08 PROCEDURE — 76210000016 HC OR PH I REC 1 TO 1.5 HR

## 2022-09-08 PROCEDURE — C1893 INTRO/SHEATH, FIXED,NON-PEEL: HCPCS | Performed by: INTERNAL MEDICINE

## 2022-09-08 PROCEDURE — 77030008684 HC TU ET CUF COVD -B: Performed by: ANESTHESIOLOGY

## 2022-09-08 PROCEDURE — C1894 INTRO/SHEATH, NON-LASER: HCPCS | Performed by: INTERNAL MEDICINE

## 2022-09-08 PROCEDURE — 77030041242 HC CBL CONN CARTO 3 J&J -D: Performed by: INTERNAL MEDICINE

## 2022-09-08 PROCEDURE — 76060000035 HC ANESTHESIA 2 TO 2.5 HR: Performed by: INTERNAL MEDICINE

## 2022-09-08 PROCEDURE — 77030005513 HC CATH URETH FOL11 MDII -B: Performed by: INTERNAL MEDICINE

## 2022-09-08 PROCEDURE — 77030013797 HC KT TRNSDUC PRSSR EDWD -A: Performed by: INTERNAL MEDICINE

## 2022-09-08 PROCEDURE — 93656 COMPRE EP EVAL ABLTJ ATR FIB: CPT | Performed by: INTERNAL MEDICINE

## 2022-09-08 PROCEDURE — 77030020506 HC NDL TRNSPTL NRG BAYL -F: Performed by: INTERNAL MEDICINE

## 2022-09-08 PROCEDURE — 74011000250 HC RX REV CODE- 250: Performed by: NURSE ANESTHETIST, CERTIFIED REGISTERED

## 2022-09-08 PROCEDURE — 74011250637 HC RX REV CODE- 250/637: Performed by: INTERNAL MEDICINE

## 2022-09-08 PROCEDURE — 77030041243 HC CBL DIAG SNSR J&J -D: Performed by: INTERNAL MEDICINE

## 2022-09-08 PROCEDURE — 77030035291 HC TBNG PMP SMARTABLATE J&J -B: Performed by: INTERNAL MEDICINE

## 2022-09-08 PROCEDURE — 76210000063 HC OR PH I REC FIRST 0.5 HR: Performed by: INTERNAL MEDICINE

## 2022-09-08 RX ORDER — FENTANYL CITRATE 50 UG/ML
50 INJECTION, SOLUTION INTRAMUSCULAR; INTRAVENOUS AS NEEDED
Status: DISCONTINUED | OUTPATIENT
Start: 2022-09-08 | End: 2022-09-08 | Stop reason: HOSPADM

## 2022-09-08 RX ORDER — HYDROCODONE BITARTRATE AND ACETAMINOPHEN 5; 325 MG/1; MG/1
1 TABLET ORAL
Status: DISCONTINUED | OUTPATIENT
Start: 2022-09-08 | End: 2022-09-08 | Stop reason: HOSPADM

## 2022-09-08 RX ORDER — ONDANSETRON 2 MG/ML
4 INJECTION INTRAMUSCULAR; INTRAVENOUS AS NEEDED
Status: DISCONTINUED | OUTPATIENT
Start: 2022-09-08 | End: 2022-09-08 | Stop reason: HOSPADM

## 2022-09-08 RX ORDER — DIPHENHYDRAMINE HYDROCHLORIDE 50 MG/ML
12.5 INJECTION, SOLUTION INTRAMUSCULAR; INTRAVENOUS AS NEEDED
Status: DISCONTINUED | OUTPATIENT
Start: 2022-09-08 | End: 2022-09-08 | Stop reason: HOSPADM

## 2022-09-08 RX ORDER — FENTANYL CITRATE 50 UG/ML
INJECTION, SOLUTION INTRAMUSCULAR; INTRAVENOUS AS NEEDED
Status: DISCONTINUED | OUTPATIENT
Start: 2022-09-08 | End: 2022-09-08 | Stop reason: HOSPADM

## 2022-09-08 RX ORDER — SUCCINYLCHOLINE CHLORIDE 20 MG/ML
INJECTION INTRAMUSCULAR; INTRAVENOUS AS NEEDED
Status: DISCONTINUED | OUTPATIENT
Start: 2022-09-08 | End: 2022-09-08 | Stop reason: HOSPADM

## 2022-09-08 RX ORDER — EPHEDRINE SULFATE/0.9% NACL/PF 50 MG/5 ML
SYRINGE (ML) INTRAVENOUS AS NEEDED
Status: DISCONTINUED | OUTPATIENT
Start: 2022-09-08 | End: 2022-09-08 | Stop reason: HOSPADM

## 2022-09-08 RX ORDER — SODIUM CHLORIDE, SODIUM LACTATE, POTASSIUM CHLORIDE, CALCIUM CHLORIDE 600; 310; 30; 20 MG/100ML; MG/100ML; MG/100ML; MG/100ML
INJECTION, SOLUTION INTRAVENOUS
Status: DISCONTINUED | OUTPATIENT
Start: 2022-09-08 | End: 2022-09-08 | Stop reason: HOSPADM

## 2022-09-08 RX ORDER — MIDAZOLAM HYDROCHLORIDE 1 MG/ML
INJECTION, SOLUTION INTRAMUSCULAR; INTRAVENOUS AS NEEDED
Status: DISCONTINUED | OUTPATIENT
Start: 2022-09-08 | End: 2022-09-08 | Stop reason: HOSPADM

## 2022-09-08 RX ORDER — ACETAMINOPHEN 325 MG/1
650 TABLET ORAL
Status: DISCONTINUED | OUTPATIENT
Start: 2022-09-08 | End: 2022-09-08 | Stop reason: HOSPADM

## 2022-09-08 RX ORDER — MORPHINE SULFATE 2 MG/ML
2 INJECTION, SOLUTION INTRAMUSCULAR; INTRAVENOUS
Status: DISCONTINUED | OUTPATIENT
Start: 2022-09-08 | End: 2022-09-08 | Stop reason: HOSPADM

## 2022-09-08 RX ORDER — SODIUM CHLORIDE 0.9 % (FLUSH) 0.9 %
5-40 SYRINGE (ML) INJECTION EVERY 8 HOURS
Status: DISCONTINUED | OUTPATIENT
Start: 2022-09-08 | End: 2022-09-08 | Stop reason: HOSPADM

## 2022-09-08 RX ORDER — SODIUM CHLORIDE 0.9 % (FLUSH) 0.9 %
5-40 SYRINGE (ML) INJECTION AS NEEDED
Status: DISCONTINUED | OUTPATIENT
Start: 2022-09-08 | End: 2022-09-08 | Stop reason: HOSPADM

## 2022-09-08 RX ORDER — EPINEPHRINE 1 MG/ML
INJECTION, SOLUTION, CONCENTRATE INTRAVENOUS AS NEEDED
Status: DISCONTINUED | OUTPATIENT
Start: 2022-09-08 | End: 2022-09-08 | Stop reason: HOSPADM

## 2022-09-08 RX ORDER — PHENYLEPHRINE HCL IN 0.9% NACL 0.4MG/10ML
SYRINGE (ML) INTRAVENOUS AS NEEDED
Status: DISCONTINUED | OUTPATIENT
Start: 2022-09-08 | End: 2022-09-08 | Stop reason: HOSPADM

## 2022-09-08 RX ORDER — VASOPRESSIN 20 U/ML
INJECTION PARENTERAL AS NEEDED
Status: DISCONTINUED | OUTPATIENT
Start: 2022-09-08 | End: 2022-09-08 | Stop reason: HOSPADM

## 2022-09-08 RX ORDER — SODIUM CHLORIDE, SODIUM LACTATE, POTASSIUM CHLORIDE, CALCIUM CHLORIDE 600; 310; 30; 20 MG/100ML; MG/100ML; MG/100ML; MG/100ML
25 INJECTION, SOLUTION INTRAVENOUS CONTINUOUS
Status: DISCONTINUED | OUTPATIENT
Start: 2022-09-08 | End: 2022-09-08

## 2022-09-08 RX ORDER — PROPOFOL 10 MG/ML
INJECTION, EMULSION INTRAVENOUS AS NEEDED
Status: DISCONTINUED | OUTPATIENT
Start: 2022-09-08 | End: 2022-09-08 | Stop reason: HOSPADM

## 2022-09-08 RX ORDER — LIDOCAINE HYDROCHLORIDE 10 MG/ML
0.1 INJECTION, SOLUTION EPIDURAL; INFILTRATION; INTRACAUDAL; PERINEURAL AS NEEDED
Status: DISCONTINUED | OUTPATIENT
Start: 2022-09-08 | End: 2022-09-08 | Stop reason: HOSPADM

## 2022-09-08 RX ORDER — HYDROMORPHONE HYDROCHLORIDE 1 MG/ML
.2-.5 INJECTION, SOLUTION INTRAMUSCULAR; INTRAVENOUS; SUBCUTANEOUS
Status: DISCONTINUED | OUTPATIENT
Start: 2022-09-08 | End: 2022-09-08 | Stop reason: HOSPADM

## 2022-09-08 RX ORDER — MIDAZOLAM HYDROCHLORIDE 1 MG/ML
0.5 INJECTION, SOLUTION INTRAMUSCULAR; INTRAVENOUS
Status: DISCONTINUED | OUTPATIENT
Start: 2022-09-08 | End: 2022-09-08 | Stop reason: HOSPADM

## 2022-09-08 RX ORDER — ONDANSETRON 2 MG/ML
INJECTION INTRAMUSCULAR; INTRAVENOUS AS NEEDED
Status: DISCONTINUED | OUTPATIENT
Start: 2022-09-08 | End: 2022-09-08 | Stop reason: HOSPADM

## 2022-09-08 RX ORDER — FENTANYL CITRATE 50 UG/ML
25 INJECTION, SOLUTION INTRAMUSCULAR; INTRAVENOUS
Status: DISCONTINUED | OUTPATIENT
Start: 2022-09-08 | End: 2022-09-08 | Stop reason: HOSPADM

## 2022-09-08 RX ORDER — DEXAMETHASONE SODIUM PHOSPHATE 4 MG/ML
INJECTION, SOLUTION INTRA-ARTICULAR; INTRALESIONAL; INTRAMUSCULAR; INTRAVENOUS; SOFT TISSUE AS NEEDED
Status: DISCONTINUED | OUTPATIENT
Start: 2022-09-08 | End: 2022-09-08 | Stop reason: HOSPADM

## 2022-09-08 RX ADMIN — PHENYLEPHRINE HYDROCHLORIDE 100 MCG/MIN: 10 INJECTION INTRAVENOUS at 08:54

## 2022-09-08 RX ADMIN — EPINEPHRINE 0.1 MG: 1 INJECTION INTRAMUSCULAR; INTRAVENOUS; SUBCUTANEOUS at 09:53

## 2022-09-08 RX ADMIN — PROPOFOL 200 MG: 10 INJECTION, EMULSION INTRAVENOUS at 08:45

## 2022-09-08 RX ADMIN — APIXABAN 2.5 MG: 2.5 TABLET, FILM COATED ORAL at 16:17

## 2022-09-08 RX ADMIN — VASOPRESSIN 2 UNITS: 20 INJECTION INTRAVENOUS at 09:54

## 2022-09-08 RX ADMIN — Medication 400 MCG: at 08:53

## 2022-09-08 RX ADMIN — FENTANYL CITRATE 50 MCG: 50 INJECTION, SOLUTION INTRAMUSCULAR; INTRAVENOUS at 08:55

## 2022-09-08 RX ADMIN — ONDANSETRON HYDROCHLORIDE 4 MG: 2 INJECTION, SOLUTION INTRAMUSCULAR; INTRAVENOUS at 10:26

## 2022-09-08 RX ADMIN — MIDAZOLAM HYDROCHLORIDE 2 MG: 1 INJECTION, SOLUTION INTRAMUSCULAR; INTRAVENOUS at 08:35

## 2022-09-08 RX ADMIN — SUCCINYLCHOLINE CHLORIDE 200 MG: 20 INJECTION, SOLUTION INTRAMUSCULAR; INTRAVENOUS at 08:45

## 2022-09-08 RX ADMIN — DEXAMETHASONE SODIUM PHOSPHATE 8 MG: 4 INJECTION, SOLUTION INTRAMUSCULAR; INTRAVENOUS at 08:55

## 2022-09-08 RX ADMIN — SODIUM CHLORIDE, POTASSIUM CHLORIDE, SODIUM LACTATE AND CALCIUM CHLORIDE: 600; 310; 30; 20 INJECTION, SOLUTION INTRAVENOUS at 08:35

## 2022-09-08 RX ADMIN — EPINEPHRINE 0.01 MG: 1 INJECTION INTRAMUSCULAR; INTRAVENOUS; SUBCUTANEOUS at 09:44

## 2022-09-08 RX ADMIN — Medication 200 MCG: at 08:48

## 2022-09-08 RX ADMIN — SODIUM CHLORIDE, POTASSIUM CHLORIDE, SODIUM LACTATE AND CALCIUM CHLORIDE: 600; 310; 30; 20 INJECTION, SOLUTION INTRAVENOUS at 10:09

## 2022-09-08 RX ADMIN — Medication 200 MCG: at 08:55

## 2022-09-08 RX ADMIN — Medication 200 MCG: at 08:45

## 2022-09-08 RX ADMIN — FENTANYL CITRATE 50 MCG: 50 INJECTION, SOLUTION INTRAMUSCULAR; INTRAVENOUS at 09:20

## 2022-09-08 RX ADMIN — Medication 20 MG: at 08:53

## 2022-09-08 NOTE — ANESTHESIA PREPROCEDURE EVALUATION
Relevant Problems   No relevant active problems       Anesthetic History   No history of anesthetic complications            Review of Systems / Medical History  Patient summary reviewed, nursing notes reviewed and pertinent labs reviewed    Pulmonary            Asthma : well controlled       Neuro/Psych   Within defined limits           Cardiovascular    Hypertension        Dysrhythmias : atrial fibrillation      Exercise tolerance: <4 METS  Comments: ECG (1/31/20): Sinus rhythm with 1st degree AV block   When compared with ECG of 16-MAR-2010 12:22,   SD interval has increased   GI/Hepatic/Renal  Within defined limits              Endo/Other        Morbid obesity and cancer    Comments: Hx Prostate Cancer    Hx Left hip arthroplasty Other Findings   Comments: Hx prostate ca  DJD           Physical Exam    Airway  Mallampati: III  TM Distance: > 6 cm  Neck ROM: normal range of motion   Mouth opening: Normal    Comments: Patient has long beard Cardiovascular    Rhythm: irregular  Rate: normal         Dental    Dentition: Caps/crowns  Comments: Multiple missing teeth, none loose.    Pulmonary      Decreased breath sounds: bibasilar           Abdominal  GI exam deferred       Other Findings            Anesthetic Plan    ASA: 3  Anesthesia type: general    Monitoring Plan: BIS      Induction: Intravenous  Anesthetic plan and risks discussed with: Patient

## 2022-09-08 NOTE — PERIOP NOTES
Handoff Report from Operating Room to PACU    Report received from [facility] and Zuleyka Chao regarding [facility]. Surgeon(s):  Anesthesia, Case  And Procedure(s) (LRB):  SPECIAL PROCEDURE OUTSIDE OF OR (N/A)  confirmed   with allergies and dressings discussed. Anesthesia type, drugs, patient history, complications, estimated blood loss, vital signs, intake and output, and last pain medication, lines, and temperature were reviewed. 1156- A-Line discontinued Pressure held until Hemostasis achieved. Pressure dressing placed. 1210- TRANSFER - OUT REPORT:    Verbal report given to Rich Angel RN(name) on [facility]  being transferred to [facility](unit) for routine post - op       Report consisted of patients Situation, Background, Assessment and   Recommendations(SBAR). Information from the following report(s) Procedure Summary, Intake/Output, MAR, Recent Results, and Cardiac Rhythm A FiB  was reviewed with the receiving nurse. Lines:   Peripheral IV 09/08/22 Left Hand (Active)   Site Assessment Clean, dry, & intact 09/08/22 1230   Phlebitis Assessment 0 09/08/22 1230   Infiltration Assessment 0 09/08/22 1230   Dressing Status Clean, dry, & intact 09/08/22 1230   Dressing Type Transparent 09/08/22 1230   Hub Color/Line Status Flushed 09/08/22 1230   Alcohol Cap Used Yes 09/08/22 1230       Peripheral IV 09/08/22 Right Hand (Active)   Site Assessment Clean, dry, & intact 09/08/22 1230   Phlebitis Assessment 0 09/08/22 1230   Infiltration Assessment 0 09/08/22 1230   Dressing Status Clean, dry, & intact 09/08/22 1230   Dressing Type Transparent 09/08/22 1230   Hub Color/Line Status Flushed 09/08/22 1230   Alcohol Cap Used Yes 09/08/22 1230        Opportunity for questions and clarification was provided.       Patient transported with:   Monitor  O2 @ 2l liters  Registered Nurse  [facility]

## 2022-09-08 NOTE — PROGRESS NOTES
Cardiac Cath Lab Recovery Arrival Note:      Jennifer Queen arrived to Cardiac Cath Lab, Recovery Area. Staff introduced to patient. Patient identifiers verified with NAME and DATE OF BIRTH. Procedure verified with patient. Consent forms reviewed and signed by patient or authorized representative and verified. Allergies verified. Patient and family oriented to department. Patient and family informed of procedure and plan of care. Questions answered with review. Patient prepped for procedure, per orders from physician, prior to arrival.    Patient on cardiac monitor, non-invasive blood pressure, SPO2 monitor. On room air. Patient is A&Ox 3. Patient reports no c/o. Patient in stretcher, in low position, with side rails up, call bell within reach, patient instructed to call if assistance as needed. Patient prep in: [address], [place] [address]. Patient family has pager # none  Family in: [address] [facility] waiting area.    Prep by: Parvez Olvera RN

## 2022-09-08 NOTE — H&P
EP/ ARRHYTHMIA Admission Note    Patient ID:  Patient: Charline Valenzuela  MRN: [id number]  Age: 78 y.o.  : 1955    Date of  Admission: 2022  5:51 AM   PCP:  Peter Benjamin MD  Usual cardiologist:  Juliette Coppola MD    Assessment:   Persistent atrial fibrillation with RVR here. Early in his procedure, he had profound hypotension (without an obvious cardiac cause by imaging or ECG), didn't look like anaphylaxis, so his procedure was aborted before any ablation was completed. Prior catheter RF ablation in 2022 with PVI, posterior box (with incomplete isolation). He did require pressor support during his last procedure, but not this much. Hypertensive heart disease without heart failure. Mildly enlarged aortic root. Mild-moderate pulmonary hypertension historically. Asthma diagnosis. Sleep apnea. Obesity. Prostate cancer with history of radiation therapy. Full code. Plan:     He got epinephrine 9 mg IV total, was maxed on yue at 300 mcg/kg/min. Given IVF. After anesthesia was stopped, he Now off all pressor support. Alert, appropriate, neuro grossly nonfocal.  Final read on pre-procedure AWA pending. Preliminarily, LVEF 50-55%, mildly dilated RV with mild hypokinesis, biatrial enlargement R>L, no significant valve disease. No KHOI thrombus. R>L shunt incidentally noted. Hold on usual OP cardiac meds, will add back as appropriate later. I'll discuss with Dr. Vickie Pedraza on whether a right heart cath is indicated to evaluate pulmonary hypertension. [x]       High complexity decision making was performed in this patient    Charline Valenzuela is a 78 y.o. male with a history of the above. He was doing OK this AM.  During his procedure, he had a fall in BP. Confirmed to be profoundly low by A-line. He was medicated as above. After anesthesia was stopped, his BP started to recover. He was taken off all pressor support.     While awake and extubated, he denies any complaints. Grossly neurologically intact. Transferred to the PACU with an A-line. Past Medical History:   Diagnosis Date    Arrhythmia     Afib    Asthma     Cancer ([address]     prostate    Hypertension         Past Surgical History:   Procedure Laterality Date    HX ORTHOPAEDIC      L hip replacement    HX OTHER SURGICAL      hematoma evacuation L hip       Social History     Tobacco Use    Smoking status: Never    Smokeless tobacco: Current    Tobacco comments:     chew tobacco   Substance Use Topics    Alcohol use: Yes     Alcohol/week: 10.0 standard drinks     Types: 10 Cans of beer per week     Comment: social/weekend        History reviewed. No pertinent family history. Allergies   Allergen Reactions    Pcn [Penicillins] Hives    Statins-Hmg-Coa Reductase Inhibitors Other (comments)     Muscle aches          No current facility-administered medications for this encounter. Facility-Administered Medications Ordered in Other Encounters   Medication Dose Route Frequency    propofoL (DIPRIVAN) 10 mg/mL injection   IntraVENous PRN    PHENYLephrine (NEOSYNEPHRINE) in NS syringe   IntraVENous PRN    succinylcholine (ANECTINE) injection   IntraVENous PRN    dexamethasone (DECADRON) 4 mg/mL injection   IntraVENous PRN    fentaNYL citrate (PF) injection   IntraVENous PRN    lactated Ringers infusion   IntraVENous CONTINUOUS    EPINEPHrine HCl (PF) (ADRENALIN) 1 mg/mL (1 mL) injection   IntraMUSCular PRN    vasopressin (VASOSTRICT) 20 unit/mL injection   IntraVENous PRN    midazolam (VERSED) injection   IntraVENous PRN    PHENYLephrine (MARIBEL-SYNEPHRINE) 30 mg in 0.9% sodium chloride 250 mL infusion   IntraVENous CONTINUOUS       Review of Symptoms:  See above. Cannot give an extensive ROS due to proximity to extubation.        Objective:      Physical Exam:  Temp (24hrs), Av.2 °F (36.8 °C), Min:98.2 °F (36.8 °C), Max:98.2 °F (36.8 °C)    Patient Vitals for the past 8 hrs:   Pulse   22 0720 (!) 128    Patient Vitals for the past 8 hrs:   Resp   09/08/22 0720 26    Patient Vitals for the past 8 hrs:   BP   09/08/22 0720 (!) 181/119      No intake or output data in the 24 hours ending 09/08/22 1021    Nondiaphoretic, not in acute distress. No scleral icterus, mucous membranes moist, conjuctivae pink, no xanthelasma. Unlabored, clear to auscultation bilaterally anteriorly, symmetric air movement. Irregular rate and rhythm, no murmur, pericardial rub, knock, or gallop. No peripheral edema. Palpable radial pulse now (during the case his femoral pulse was faint initially, then nonpalpable). Abdomen, soft, nontender, nondistended. Extremities without cyanosis or clubbing. Muscle tone and bulk normal.  Skin warm and dry. No rashes or ulcers. Neuro grossly nonfocal.  No tremor. Awake and appropriate. CARDIOGRAPHICS and STUDIES, I reviewed:    Telemetry:  Afib.       Apurva Cardoza MD  9/8/2022

## 2022-09-08 NOTE — PROGRESS NOTES
Patient is ambulating at baseline and vitals stable. Right and Left groin site clean, dry, intact. Discharge and follow-up care given. Site care instructions and medication changes reviewed. Patient discharged home with daughter as .

## 2022-09-08 NOTE — DISCHARGE SUMMARY
Cardiology Observation Discharge Summary (Same day)             Patient ID:  Tim mSith  [id number]  28 y.o.  1955    Admit Date: 9/8/2022     Discharge Date: 9/8/2022   Admitting Physician: Jeana Albert MD   Discharge Physician: Jeana Albert MD    Admission and Discharge Diagnoses include: Adverse reaction to anesthesia (in combination with lisinopril)  Persistent atrial fibrillation    Cardiology Procedures this Admission:  AWA  2. Aborted EP study and atrial fibrillation ablation    Hospital Course and Discharge Exam:  Admitted for observation after profound hypotension after anesthesia induction. This reversed ultimately after stopping anesthesia. Thought to be due to taking lisinopril earlier this AM.  Admitted for observation. On the day of discharge, ambulatory and taking oral.  All questions answered. Aware of signs and symptoms warranting urgent medical follow-up or calling 911. Visit Vitals  BP (!) 153/84 (BP 1 Location: Right upper arm, BP Patient Position: Sitting)   Pulse (!) 102   Temp 98.5 °F (36.9 °C)   Resp 19   Ht 6' (1.829 m)   Wt (!) 158.8 kg (350 lb 1.5 oz)   SpO2 96%   BMI 47.48 kg/m²       Physical Exam:  Nondiaphoretic, not in acute distress. Unlabored, clear to auscultation bilaterally. Irregular rate and rhythm, no murmur, rub, or gallop. No peripheral edema. Palpable radial pulses bilaterally. Groin site(s) OK. No cyanosis. Skin warm and dry. Awake, appropriate, neuro grossly nonfocal.  Ambulatory. Disposition: home with a     Patient Instructions:   Current Discharge Medication List        CONTINUE these medications which have NOT CHANGED    Details   albuterol (PROVENTIL HFA, VENTOLIN HFA, PROAIR HFA) 90 mcg/actuation inhaler Take 2 Puffs by inhalation every six (6) hours as needed for Wheezing. nebivoloL (BYSTOLIC) 10 mg tablet Take 10 mg by mouth daily. doxazosin (CARDURA) 2 mg tablet Take 2 mg by mouth daily. lisinopril (PRINIVIL, ZESTRIL) 40 mg tablet Take 40 mg by mouth daily. apixaban (ELIQUIS) 5 mg tablet Take 5 mg by mouth two (2) times a day. FOLLOW-UP:       Call the office [phone] to make an appointment for 2-4 weeks with the NP if needed. Will try to reschedule his ablation in the future and he will be instructed to NOT take any meds the morning of his procedure. [address] [place] [address], [address]    [phone]  [place], [address] [place] [address]    [email or web address]    Signed:  Vipin Sesay MD  9/8/2022

## 2022-09-08 NOTE — Clinical Note
Right femoral vein. Accessed successfully. Femoral access needle used. Using fluoro guidance.  Access x2

## 2022-09-08 NOTE — PROGRESS NOTES
After walking the pt down the puentes he became short of breath, diaphoretic, and his O2 sats dropped to 88%. Pt stated the sweating and SOB happens when he walks at home. After returning to the room pt returned to the chair, 2L NC was applied he returned to 96%. I spoke with Dr. Elizabeth Salgado and gave him the update, he stated to proceed with discharge. Pt is currently sitting at rest on room air at 93% O2.

## 2022-09-08 NOTE — Clinical Note
TRANSFER - IN REPORT:     Verbal report received from: recovery. Report consisted of patient's Situation, Background, Assessment and   Recommendations(SBAR). Opportunity for questions and clarification was provided. Assessment completed upon patient's arrival to unit and care assumed. Patient transported with a Registered Nurse and [address] [place] [address] / [facility] [employer].

## 2022-09-08 NOTE — Clinical Note
TRANSFER - OUT REPORT:     Verbal report given to: Anika RUTLEDGE. Report consisted of patient's Situation, Background, Assessment and   Recommendations(SBAR). Opportunity for questions and clarification was provided. Patient transported with a Registered Nurse and [address] [place] [address] / [employer] [facility] [place]. Patient transported to: pacu.

## 2022-09-08 NOTE — PROGRESS NOTES
Primary Nurse Rashmi Ling RN and Cody Casanova RN performed a dual skin assessment on this patient No impairment noted  Deshawn score is 23

## 2022-09-09 NOTE — ANESTHESIA POSTPROCEDURE EVALUATION
Procedure(s):  ABLATION A-FIB  W COMPLETE EP STUDY.     general    <BSHSIANPOST>    INITIAL Post-op Vital signs:   Vitals Value Taken Time   /79 09/08/22 1727   Temp 36.9 °C (98.5 °F) 09/08/22 1451   Pulse 115 09/08/22 1727   Resp 18 09/08/22 1727   SpO2 93 % 09/08/22 1727

## 2022-10-20 ENCOUNTER — HOSPITAL ENCOUNTER (OUTPATIENT)
Age: 67
Discharge: HOME | End: 2022-10-21
Attending: INTERNAL MEDICINE | Admitting: INTERNAL MEDICINE
Payer: MEDICARE

## 2022-10-20 ENCOUNTER — ANESTHESIA EVENT (OUTPATIENT)
Dept: CARDIAC CATH/INVASIVE PROCEDURES | Age: 67
End: 2022-10-20
Payer: MEDICARE

## 2022-10-20 ENCOUNTER — APPOINTMENT (OUTPATIENT)
Dept: CARDIAC CATH/INVASIVE PROCEDURES | Age: 67
End: 2022-10-20
Attending: INTERNAL MEDICINE
Payer: MEDICARE

## 2022-10-20 ENCOUNTER — ANESTHESIA (OUTPATIENT)
Dept: CARDIAC CATH/INVASIVE PROCEDURES | Age: 67
End: 2022-10-20
Payer: MEDICARE

## 2022-10-20 DIAGNOSIS — I48.91 ATRIAL FIBRILLATION, UNSPECIFIED TYPE (HCC): ICD-10-CM

## 2022-10-20 DIAGNOSIS — I48.91 A-FIB (HCC): ICD-10-CM

## 2022-10-20 LAB
ACT BLD: 132 SECS (ref 79–138)
ACT BLD: 213 SECS (ref 79–138)
ACT BLD: 231 SECS (ref 79–138)
ACT BLD: 254 SECS (ref 79–138)
ACT BLD: 300 SECS (ref 79–138)

## 2022-10-20 PROCEDURE — 77030026438 HC STYL ET INTUB CARD -A: Performed by: STUDENT IN AN ORGANIZED HEALTH CARE EDUCATION/TRAINING PROGRAM

## 2022-10-20 PROCEDURE — 77030020506 HC NDL TRNSPTL NRG BAYL -F: Performed by: INTERNAL MEDICINE

## 2022-10-20 PROCEDURE — 77030027107 HC PTCH EXT REF CARTO3 J&J -F: Performed by: INTERNAL MEDICINE

## 2022-10-20 PROCEDURE — 2709999900 HC NON-CHARGEABLE SUPPLY: Performed by: INTERNAL MEDICINE

## 2022-10-20 PROCEDURE — 93621 COMP EP EVL L PAC&REC C SINS: CPT | Performed by: INTERNAL MEDICINE

## 2022-10-20 PROCEDURE — 74011250636 HC RX REV CODE- 250/636: Performed by: NURSE ANESTHETIST, CERTIFIED REGISTERED

## 2022-10-20 PROCEDURE — C1730 CATH, EP, 19 OR FEW ELECT: HCPCS | Performed by: INTERNAL MEDICINE

## 2022-10-20 PROCEDURE — 76210000017 HC OR PH I REC 1.5 TO 2 HR: Performed by: INTERNAL MEDICINE

## 2022-10-20 PROCEDURE — 77030018729 HC ELECTRD DEFIB PAD CARD -B: Performed by: INTERNAL MEDICINE

## 2022-10-20 PROCEDURE — C1759 CATH, INTRA ECHOCARDIOGRAPHY: HCPCS | Performed by: INTERNAL MEDICINE

## 2022-10-20 PROCEDURE — 74011250637 HC RX REV CODE- 250/637: Performed by: INTERNAL MEDICINE

## 2022-10-20 PROCEDURE — 77030013797 HC KT TRNSDUC PRSSR EDWD -A: Performed by: INTERNAL MEDICINE

## 2022-10-20 PROCEDURE — 77030010880 HC CBL EP SUPRME STJU -C: Performed by: INTERNAL MEDICINE

## 2022-10-20 PROCEDURE — 77030041009 HC ADTR CBL EP DX J&J -D: Performed by: INTERNAL MEDICINE

## 2022-10-20 PROCEDURE — 77030005513 HC CATH URETH FOL11 MDII -B: Performed by: INTERNAL MEDICINE

## 2022-10-20 PROCEDURE — 74011250636 HC RX REV CODE- 250/636

## 2022-10-20 PROCEDURE — C1732 CATH, EP, DIAG/ABL, 3D/VECT: HCPCS | Performed by: INTERNAL MEDICINE

## 2022-10-20 PROCEDURE — 93656 COMPRE EP EVAL ABLTJ ATR FIB: CPT | Performed by: INTERNAL MEDICINE

## 2022-10-20 PROCEDURE — 76060000037 HC ANESTHESIA 3 TO 3.5 HR: Performed by: INTERNAL MEDICINE

## 2022-10-20 PROCEDURE — C1893 INTRO/SHEATH, FIXED,NON-PEEL: HCPCS | Performed by: INTERNAL MEDICINE

## 2022-10-20 PROCEDURE — 77030035291 HC TBNG PMP SMARTABLATE J&J -B: Performed by: INTERNAL MEDICINE

## 2022-10-20 PROCEDURE — 93312 ECHO TRANSESOPHAGEAL: CPT

## 2022-10-20 PROCEDURE — 76210000006 HC OR PH I REC 0.5 TO 1 HR

## 2022-10-20 PROCEDURE — 85347 COAGULATION TIME ACTIVATED: CPT

## 2022-10-20 PROCEDURE — C1894 INTRO/SHEATH, NON-LASER: HCPCS | Performed by: INTERNAL MEDICINE

## 2022-10-20 PROCEDURE — 74011250636 HC RX REV CODE- 250/636: Performed by: INTERNAL MEDICINE

## 2022-10-20 PROCEDURE — 77030015398 HC CBL EP EXT STJU -C: Performed by: INTERNAL MEDICINE

## 2022-10-20 PROCEDURE — 74011000250 HC RX REV CODE- 250: Performed by: NURSE ANESTHETIST, CERTIFIED REGISTERED

## 2022-10-20 PROCEDURE — 77030008684 HC TU ET CUF COVD -B: Performed by: STUDENT IN AN ORGANIZED HEALTH CARE EDUCATION/TRAINING PROGRAM

## 2022-10-20 PROCEDURE — 74011000258 HC RX REV CODE- 258: Performed by: NURSE ANESTHETIST, CERTIFIED REGISTERED

## 2022-10-20 PROCEDURE — 74011250637 HC RX REV CODE- 250/637: Performed by: NURSE PRACTITIONER

## 2022-10-20 PROCEDURE — 74011000250 HC RX REV CODE- 250: Performed by: NURSE PRACTITIONER

## 2022-10-20 PROCEDURE — 77030029359 HC PRB ESOPH TEMP CATH ANTM -F: Performed by: INTERNAL MEDICINE

## 2022-10-20 RX ORDER — SODIUM CHLORIDE 0.9 % (FLUSH) 0.9 %
5-40 SYRINGE (ML) INJECTION AS NEEDED
Status: DISCONTINUED | OUTPATIENT
Start: 2022-10-20 | End: 2022-10-21 | Stop reason: HOSPADM

## 2022-10-20 RX ORDER — SUCCINYLCHOLINE CHLORIDE 20 MG/ML
INJECTION INTRAMUSCULAR; INTRAVENOUS AS NEEDED
Status: DISCONTINUED | OUTPATIENT
Start: 2022-10-20 | End: 2022-10-20 | Stop reason: HOSPADM

## 2022-10-20 RX ORDER — MIDAZOLAM HYDROCHLORIDE 1 MG/ML
INJECTION, SOLUTION INTRAMUSCULAR; INTRAVENOUS AS NEEDED
Status: DISCONTINUED | OUTPATIENT
Start: 2022-10-20 | End: 2022-10-20 | Stop reason: HOSPADM

## 2022-10-20 RX ORDER — NEOSTIGMINE METHYLSULFATE 1 MG/ML
INJECTION, SOLUTION INTRAVENOUS AS NEEDED
Status: DISCONTINUED | OUTPATIENT
Start: 2022-10-20 | End: 2022-10-20 | Stop reason: HOSPADM

## 2022-10-20 RX ORDER — FENTANYL CITRATE 50 UG/ML
25 INJECTION, SOLUTION INTRAMUSCULAR; INTRAVENOUS
Status: DISCONTINUED | OUTPATIENT
Start: 2022-10-20 | End: 2022-10-20 | Stop reason: HOSPADM

## 2022-10-20 RX ORDER — HEPARIN SODIUM 1000 [USP'U]/ML
INJECTION, SOLUTION INTRAVENOUS; SUBCUTANEOUS AS NEEDED
Status: DISCONTINUED | OUTPATIENT
Start: 2022-10-20 | End: 2022-10-20 | Stop reason: HOSPADM

## 2022-10-20 RX ORDER — GLYCOPYRROLATE 0.2 MG/ML
INJECTION INTRAMUSCULAR; INTRAVENOUS AS NEEDED
Status: DISCONTINUED | OUTPATIENT
Start: 2022-10-20 | End: 2022-10-20 | Stop reason: HOSPADM

## 2022-10-20 RX ORDER — LISINOPRIL 20 MG/1
40 TABLET ORAL DAILY
Status: DISCONTINUED | OUTPATIENT
Start: 2022-10-20 | End: 2022-10-21 | Stop reason: HOSPADM

## 2022-10-20 RX ORDER — KETAMINE HYDROCHLORIDE 50 MG/ML
INJECTION, SOLUTION INTRAMUSCULAR; INTRAVENOUS AS NEEDED
Status: DISCONTINUED | OUTPATIENT
Start: 2022-10-20 | End: 2022-10-20 | Stop reason: HOSPADM

## 2022-10-20 RX ORDER — PROPAFENONE HYDROCHLORIDE 150 MG/1
225 TABLET, FILM COATED ORAL EVERY 8 HOURS
Status: DISCONTINUED | OUTPATIENT
Start: 2022-10-21 | End: 2022-10-21 | Stop reason: HOSPADM

## 2022-10-20 RX ORDER — LISINOPRIL 20 MG/1
40 TABLET ORAL DAILY
Status: DISCONTINUED | OUTPATIENT
Start: 2022-10-21 | End: 2022-10-20

## 2022-10-20 RX ORDER — SODIUM CHLORIDE 0.9 % (FLUSH) 0.9 %
5-40 SYRINGE (ML) INJECTION EVERY 8 HOURS
Status: DISCONTINUED | OUTPATIENT
Start: 2022-10-20 | End: 2022-10-21 | Stop reason: HOSPADM

## 2022-10-20 RX ORDER — DOXAZOSIN 2 MG/1
2 TABLET ORAL DAILY
Status: DISCONTINUED | OUTPATIENT
Start: 2022-10-21 | End: 2022-10-21 | Stop reason: HOSPADM

## 2022-10-20 RX ORDER — PHENYLEPHRINE HCL IN 0.9% NACL 0.4MG/10ML
SYRINGE (ML) INTRAVENOUS
Status: DISCONTINUED | OUTPATIENT
Start: 2022-10-20 | End: 2022-10-20 | Stop reason: HOSPADM

## 2022-10-20 RX ORDER — PROPOFOL 10 MG/ML
INJECTION, EMULSION INTRAVENOUS AS NEEDED
Status: DISCONTINUED | OUTPATIENT
Start: 2022-10-20 | End: 2022-10-20 | Stop reason: HOSPADM

## 2022-10-20 RX ORDER — ONDANSETRON 2 MG/ML
INJECTION INTRAMUSCULAR; INTRAVENOUS AS NEEDED
Status: DISCONTINUED | OUTPATIENT
Start: 2022-10-20 | End: 2022-10-20 | Stop reason: HOSPADM

## 2022-10-20 RX ORDER — ROCURONIUM BROMIDE 10 MG/ML
INJECTION, SOLUTION INTRAVENOUS AS NEEDED
Status: DISCONTINUED | OUTPATIENT
Start: 2022-10-20 | End: 2022-10-20 | Stop reason: HOSPADM

## 2022-10-20 RX ORDER — METOPROLOL SUCCINATE 50 MG/1
50 TABLET, EXTENDED RELEASE ORAL DAILY
COMMUNITY

## 2022-10-20 RX ORDER — SODIUM CHLORIDE 9 MG/ML
INJECTION, SOLUTION INTRAVENOUS
Status: DISCONTINUED | OUTPATIENT
Start: 2022-10-20 | End: 2022-10-20 | Stop reason: HOSPADM

## 2022-10-20 RX ORDER — METOPROLOL SUCCINATE 50 MG/1
50 TABLET, EXTENDED RELEASE ORAL DAILY
Status: DISCONTINUED | OUTPATIENT
Start: 2022-10-20 | End: 2022-10-21 | Stop reason: HOSPADM

## 2022-10-20 RX ORDER — FENTANYL CITRATE 50 UG/ML
INJECTION, SOLUTION INTRAMUSCULAR; INTRAVENOUS AS NEEDED
Status: DISCONTINUED | OUTPATIENT
Start: 2022-10-20 | End: 2022-10-20 | Stop reason: HOSPADM

## 2022-10-20 RX ORDER — HYDRALAZINE HYDROCHLORIDE 20 MG/ML
10 INJECTION INTRAMUSCULAR; INTRAVENOUS ONCE
Status: COMPLETED | OUTPATIENT
Start: 2022-10-20 | End: 2022-10-20

## 2022-10-20 RX ORDER — METOPROLOL SUCCINATE 50 MG/1
50 TABLET, EXTENDED RELEASE ORAL DAILY
Status: DISCONTINUED | OUTPATIENT
Start: 2022-10-21 | End: 2022-10-20

## 2022-10-20 RX ORDER — PROPOFOL 10 MG/ML
INJECTION, EMULSION INTRAVENOUS
Status: DISCONTINUED | OUTPATIENT
Start: 2022-10-20 | End: 2022-10-20 | Stop reason: HOSPADM

## 2022-10-20 RX ORDER — OXYCODONE HYDROCHLORIDE 5 MG/1
5 TABLET ORAL AS NEEDED
Status: DISCONTINUED | OUTPATIENT
Start: 2022-10-20 | End: 2022-10-20 | Stop reason: HOSPADM

## 2022-10-20 RX ORDER — PANTOPRAZOLE SODIUM 40 MG/1
40 TABLET, DELAYED RELEASE ORAL
Status: DISCONTINUED | OUTPATIENT
Start: 2022-10-21 | End: 2022-10-21 | Stop reason: HOSPADM

## 2022-10-20 RX ORDER — ACETAMINOPHEN 325 MG/1
650 TABLET ORAL
Status: DISCONTINUED | OUTPATIENT
Start: 2022-10-20 | End: 2022-10-21 | Stop reason: HOSPADM

## 2022-10-20 RX ORDER — ALBUTEROL SULFATE 0.83 MG/ML
2.5 SOLUTION RESPIRATORY (INHALATION)
Status: DISCONTINUED | OUTPATIENT
Start: 2022-10-20 | End: 2022-10-21 | Stop reason: HOSPADM

## 2022-10-20 RX ORDER — ONDANSETRON 2 MG/ML
4 INJECTION INTRAMUSCULAR; INTRAVENOUS AS NEEDED
Status: DISCONTINUED | OUTPATIENT
Start: 2022-10-20 | End: 2022-10-20 | Stop reason: HOSPADM

## 2022-10-20 RX ORDER — LIDOCAINE HYDROCHLORIDE 20 MG/ML
INJECTION, SOLUTION EPIDURAL; INFILTRATION; INTRACAUDAL; PERINEURAL AS NEEDED
Status: DISCONTINUED | OUTPATIENT
Start: 2022-10-20 | End: 2022-10-20 | Stop reason: HOSPADM

## 2022-10-20 RX ORDER — HYDROMORPHONE HYDROCHLORIDE 1 MG/ML
0.2 INJECTION, SOLUTION INTRAMUSCULAR; INTRAVENOUS; SUBCUTANEOUS
Status: DISCONTINUED | OUTPATIENT
Start: 2022-10-20 | End: 2022-10-20 | Stop reason: HOSPADM

## 2022-10-20 RX ADMIN — HEPARIN SODIUM 5000 UNITS: 1000 INJECTION, SOLUTION INTRAVENOUS; SUBCUTANEOUS at 14:20

## 2022-10-20 RX ADMIN — KETAMINE HYDROCHLORIDE 25 MG: 50 INJECTION, SOLUTION, CONCENTRATE INTRAMUSCULAR; INTRAVENOUS at 12:34

## 2022-10-20 RX ADMIN — GLYCOPYRROLATE 0.4 MG: 0.2 INJECTION, SOLUTION INTRAMUSCULAR; INTRAVENOUS at 15:24

## 2022-10-20 RX ADMIN — FENTANYL CITRATE 50 MCG: 50 INJECTION, SOLUTION INTRAMUSCULAR; INTRAVENOUS at 15:30

## 2022-10-20 RX ADMIN — KETAMINE HYDROCHLORIDE 25 MG: 50 INJECTION, SOLUTION, CONCENTRATE INTRAMUSCULAR; INTRAVENOUS at 12:30

## 2022-10-20 RX ADMIN — ROCURONIUM BROMIDE 10 MG: 10 INJECTION INTRAVENOUS at 12:35

## 2022-10-20 RX ADMIN — HEPARIN SODIUM 5000 UNITS: 1000 INJECTION, SOLUTION INTRAVENOUS; SUBCUTANEOUS at 13:37

## 2022-10-20 RX ADMIN — SODIUM CHLORIDE, PRESERVATIVE FREE 10 ML: 5 INJECTION INTRAVENOUS at 19:13

## 2022-10-20 RX ADMIN — ROCURONIUM BROMIDE 10 MG: 10 INJECTION INTRAVENOUS at 12:56

## 2022-10-20 RX ADMIN — HYDRALAZINE HYDROCHLORIDE 10 MG: 20 INJECTION INTRAMUSCULAR; INTRAVENOUS at 11:22

## 2022-10-20 RX ADMIN — LIDOCAINE HYDROCHLORIDE 100 MG: 20 INJECTION, SOLUTION INTRAVENOUS at 12:33

## 2022-10-20 RX ADMIN — SODIUM CHLORIDE: 9 INJECTION, SOLUTION INTRAVENOUS at 12:27

## 2022-10-20 RX ADMIN — HEPARIN SODIUM 5000 UNITS: 1000 INJECTION, SOLUTION INTRAVENOUS; SUBCUTANEOUS at 14:00

## 2022-10-20 RX ADMIN — FENTANYL CITRATE 50 MCG: 50 INJECTION, SOLUTION INTRAMUSCULAR; INTRAVENOUS at 12:34

## 2022-10-20 RX ADMIN — HEPARIN SODIUM 14000 UNITS: 1000 INJECTION, SOLUTION INTRAVENOUS; SUBCUTANEOUS at 13:14

## 2022-10-20 RX ADMIN — LISINOPRIL 40 MG: 20 TABLET ORAL at 21:06

## 2022-10-20 RX ADMIN — Medication 3 MG: at 15:24

## 2022-10-20 RX ADMIN — APIXABAN 2.5 MG: 2.5 TABLET, FILM COATED ORAL at 21:06

## 2022-10-20 RX ADMIN — ONDANSETRON HYDROCHLORIDE 4 MG: 2 INJECTION, SOLUTION INTRAMUSCULAR; INTRAVENOUS at 15:11

## 2022-10-20 RX ADMIN — PROPOFOL 50 MCG/KG/MIN: 10 INJECTION, EMULSION INTRAVENOUS at 12:39

## 2022-10-20 RX ADMIN — ONDANSETRON HYDROCHLORIDE 4 MG: 2 INJECTION, SOLUTION INTRAMUSCULAR; INTRAVENOUS at 12:42

## 2022-10-20 RX ADMIN — SODIUM CHLORIDE: 9 INJECTION, SOLUTION INTRAVENOUS at 13:48

## 2022-10-20 RX ADMIN — SUCCINYLCHOLINE CHLORIDE 300 MG: 20 INJECTION, SOLUTION INTRAMUSCULAR; INTRAVENOUS at 12:36

## 2022-10-20 RX ADMIN — Medication 50 MCG/MIN: at 12:38

## 2022-10-20 RX ADMIN — PROPOFOL 150 MG: 10 INJECTION, EMULSION INTRAVENOUS at 12:34

## 2022-10-20 RX ADMIN — ROCURONIUM BROMIDE 10 MG: 10 INJECTION INTRAVENOUS at 12:45

## 2022-10-20 RX ADMIN — METOPROLOL SUCCINATE 50 MG: 50 TABLET, EXTENDED RELEASE ORAL at 21:10

## 2022-10-20 RX ADMIN — ROCURONIUM BROMIDE 10 MG: 10 INJECTION INTRAVENOUS at 13:09

## 2022-10-20 RX ADMIN — MIDAZOLAM HYDROCHLORIDE 1 MG: 1 INJECTION, SOLUTION INTRAMUSCULAR; INTRAVENOUS at 12:27

## 2022-10-20 RX ADMIN — MIDAZOLAM HYDROCHLORIDE 1 MG: 1 INJECTION, SOLUTION INTRAMUSCULAR; INTRAVENOUS at 12:34

## 2022-10-20 NOTE — H&P
EP/ ARRHYTHMIA    Patient ID:  Patient: Manuel Lagos  MRN: [id number]  Age: 78 y.o.  : 1955    Date of  Admission: 10/20/2022  9:15 AM   PCP:  Torrie Elder MD    Assessment:   Persistent Afib. Prior Afib ablation. Severe hypotension during a prior general anesthesia induction. Rescheduled his ablation with med hold instructions. Plan:     Given the potential benefits, risks, and alternatives, he agrees to proceed with Afib ablation. He did not take his lisinopril yesterday or today. Manuel Lagos is a 78 y.o. male with a history of  the above here for his procedure. Past Medical History:   Diagnosis Date    Arrhythmia     Afib    Asthma     Cancer ([address]     prostate    Hypertension         Past Surgical History:   Procedure Laterality Date    HX ORTHOPAEDIC      L hip replacement    HX OTHER SURGICAL      hematoma evacuation L hip       Social History     Tobacco Use    Smoking status: Never    Smokeless tobacco: Current    Tobacco comments:     chew tobacco   Substance Use Topics    Alcohol use: Yes     Alcohol/week: 10.0 standard drinks     Types: 10 Cans of beer per week     Comment: social/weekend        History reviewed. No pertinent family history. Allergies   Allergen Reactions    Pcn [Penicillins] Hives    Statins-Hmg-Coa Reductase Inhibitors Other (comments)     Muscle aches          No current facility-administered medications for this encounter. Review of Symptoms:  See OV. Objective:      Physical Exam:  Temp (24hrs), Av.1 °F (36.7 °C), Min:98.1 °F (36.7 °C), Max:98.1 °F (36.7 °C)    Patient Vitals for the past 8 hrs:   Pulse   10/20/22 1059 (!) 137    Patient Vitals for the past 8 hrs:   Resp   10/20/22 1059 21    Patient Vitals for the past 8 hrs:   BP   10/20/22 1059 (!) 231/194      No intake or output data in the 24 hours ending 10/20/22 1230    Nondiaphoretic, not in acute distress.   Supple, no palpable thyromegaly. Neuro grossly nonfocal.  No tremor. Awake and appropriate.     CARDIOGRAPHICS and STUDIES, I reviewed:    Telemetry:  Dick Perdomo MD  10/20/2022

## 2022-10-20 NOTE — ANESTHESIA PREPROCEDURE EVALUATION
Relevant Problems   No relevant active problems       Anesthetic History   No history of anesthetic complications       Comments: Profound hypotension after previous anesthetic thought to be due to taking lisinopril day of     Review of Systems / Medical History  Patient summary reviewed, nursing notes reviewed and pertinent labs reviewed    Pulmonary            Asthma : well controlled       Neuro/Psych   Within defined limits           Cardiovascular    Hypertension        Dysrhythmias : atrial fibrillation      Exercise tolerance: <4 METS  Comments: ECG (1/31/20): Sinus rhythm with 1st degree AV block   When compared with ECG of 16-MAR-2010 12:22,   MD interval has increased   GI/Hepatic/Renal  Within defined limits              Endo/Other        Morbid obesity and cancer    Comments: Hx Prostate Cancer    Hx Left hip arthroplasty Other Findings   Comments: Hx prostate ca  DJD           Physical Exam    Airway  Mallampati: III  TM Distance: > 6 cm  Neck ROM: normal range of motion   Mouth opening: Normal    Comments: Patient has long beard Cardiovascular    Rhythm: irregular  Rate: normal         Dental    Dentition: Caps/crowns  Comments: Multiple missing teeth, none loose.    Pulmonary      Decreased breath sounds: bibasilar           Abdominal  GI exam deferred       Other Findings            Anesthetic Plan    ASA: 3  Anesthesia type: general    Monitoring Plan: BIS      Induction: Intravenous  Anesthetic plan and risks discussed with: Patient

## 2022-10-20 NOTE — Clinical Note
TRANSFER - IN REPORT:     Verbal report received from: Recovery RN. Report consisted of patient's Situation, Background, Assessment and   Recommendations(SBAR). Opportunity for questions and clarification was provided. Assessment completed upon patient's arrival to unit and care assumed. Patient transported with a Registered Nurse.

## 2022-10-20 NOTE — Clinical Note
Transseptal Cath Performed under hemodynamic and ICE and Fluoro, utilizing a standard needle, Val[place] 98cm via a guiding sheath. Needle removed.

## 2022-10-20 NOTE — Clinical Note
TRANSFER - OUT REPORT:     Verbal report given to: Juany Valdez, (at bedside). Report consisted of patient's Situation, Background, Assessment and   Recommendations(SBAR). Opportunity for questions and clarification was provided. Patient transported with a Registered Nurse. Patient transported to: PACU.

## 2022-10-20 NOTE — Clinical Note
sheath exchanged for [facility] [address] 8.5F 22MM MED CRV -- Keith Hernandez.

## 2022-10-20 NOTE — PROGRESS NOTES
Primary Nurse Deyanira Coyne RN and Brien Tolbert RN performed a dual skin assessment on this patient No impairment noted  Deshawn score is 23    Pt refused

## 2022-10-20 NOTE — Clinical Note
Transseptal Cath Performed under hemodynamic and ICE and Fluoro, utilizing a standard needle, [place] 98cm via a guiding sheath. Needle inserted.

## 2022-10-20 NOTE — PROGRESS NOTES
Pt sat up to 30 degrees at 1651. Oozing noted on left clotting pad. HOB flat. Manual pressure for 10 min. New quick clot and tegaderm applied. Pt education on need to lay flat for another hour and that the 4 hrs of bed rest restarts.

## 2022-10-20 NOTE — Clinical Note
Right femoral vein. Accessed successfully. Femoral access needle used. Number of attempts =  2.  Access x2

## 2022-10-20 NOTE — PERIOP NOTES
Handoff Report from Operating Room to PACU    Report received from Sadie Reyes  and Betty Spencer CRNA regarding Bennett Chen. Surgeon(s):  Anesthesia, Case  Fermin Cushing, MD  And Procedure(s) (LRB):  SPECIAL PROCEDURE OUTSIDE OF OR (N/A)  confirmed   with allergies, drains, and dressings discussed. Anesthesia type, drugs, patient history, complications, estimated blood loss, vital signs, intake and output, and last pain medication and reversal medications were reviewed.

## 2022-10-20 NOTE — PERIOP NOTES
TRANSFER - OUT REPORT:    Verbal report given to Regina Heller RN (name) on Eva Mcmullen  being transferred to 2160(unit) for routine post - op       Report consisted of patients Situation, Background, Assessment and   Recommendations(SBAR). Information from the following report(s) SBAR, Kardex, OR Summary, Intake/Output, MAR, Recent Results, and Cardiac Rhythm afib  was reviewed with the receiving nurse. Opportunity for questions and clarification was provided.       Patient transported with:   Monitor  O2 @ 3 liters  Registered Nurse  [facility]

## 2022-10-20 NOTE — PROGRESS NOTES
Cardiac Cath Lab Recovery Arrival Note:      Negrita Villegas arrived to Cardiac Cath Lab, Recovery Area. Staff introduced to patient. Patient identifiers verified with NAME and DATE OF BIRTH. Procedure verified with patient. Consent forms reviewed and signed by patient or authorized representative and verified. Allergies verified. Patient and family oriented to department. Patient and family informed of procedure and plan of care. Questions answered with review. Patient prepped for procedure, per orders from physician, prior to arrival.    Patient on cardiac monitor, non-invasive blood pressure, SPO2 monitor. On room air. Patient is A&Ox 3. Patient reports no c/o. Patient in stretcher, in low position, with side rails up, call bell within reach, patient instructed to call if assistance as needed. Patient prep in: [address], [place]. Patient family has pager # none  Family in: [facility] waiting area.    Prep by: Jose Rafael Khoury RN

## 2022-10-20 NOTE — ANESTHESIA POSTPROCEDURE EVALUATION
Procedure(s):  ABLATION A-FIB  W COMPLETE EP STUDY. general    Anesthesia Post Evaluation      Multimodal analgesia: multimodal analgesia used between 6 hours prior to anesthesia start to PACU discharge  Patient location during evaluation: PACU  Level of consciousness: sleepy but conscious  Pain management: adequate  Airway patency: patent  Anesthetic complications: no  Cardiovascular status: acceptable  Respiratory status: acceptable  Hydration status: acceptable  Comments: +Post-Anesthesia Evaluation and Assessment    Patient: Stephany Larios MRN: [id number]  SSN: [id number]   YOB: 1955  Age: 78 y.o. Sex: male      Cardiovascular Function/Vital Signs    /79   Pulse 95   Temp 36.7 °C (98 °F)   Resp 18   Ht 6' (1.829 m)   Wt 158.8 kg (350 lb)   SpO2 95%   BMI 47.47 kg/m²     Patient is status post Procedure(s):  ABLATION A-FIB  W COMPLETE EP STUDY. Nausea/Vomiting: Controlled. Postoperative hydration reviewed and adequate. Pain:  Pain Scale 1: Numeric (0 - 10) (10/20/22 1610)  Pain Intensity 1: 0 (10/20/22 1610)   Managed. Neurological Status:   Neuro (WDL): Within Defined Limits (10/20/22 1555)   At baseline. Mental Status and Level of Consciousness: Arousable. Pulmonary Status:   O2 Device: Nasal cannula (10/20/22 1610)   Adequate oxygenation and airway patent. Complications related to anesthesia: None    Post-anesthesia assessment completed. No concerns. Signed By: Dalia Kelley MD    10/20/2022  Post anesthesia nausea and vomiting:  controlled  Final Post Anesthesia Temperature Assessment:  Normothermia (36.0-37.5 degrees C)      INITIAL Post-op Vital signs:   Vitals Value Taken Time   /79 10/20/22 1610   Temp 36.7 °C (98 °F) 10/20/22 1610   Pulse 92 10/20/22 1614   Resp 15 10/20/22 1614   SpO2 97 % 10/20/22 1614   Vitals shown include unvalidated device data.

## 2022-10-21 VITALS
DIASTOLIC BLOOD PRESSURE: 66 MMHG | WEIGHT: 315 LBS | HEART RATE: 96 BPM | HEIGHT: 72 IN | SYSTOLIC BLOOD PRESSURE: 103 MMHG | TEMPERATURE: 98.2 F | RESPIRATION RATE: 20 BRPM | BODY MASS INDEX: 42.66 KG/M2 | OXYGEN SATURATION: 95 %

## 2022-10-21 PROCEDURE — 74011250637 HC RX REV CODE- 250/637: Performed by: INTERNAL MEDICINE

## 2022-10-21 PROCEDURE — 74011250637 HC RX REV CODE- 250/637: Performed by: NURSE PRACTITIONER

## 2022-10-21 RX ORDER — PANTOPRAZOLE SODIUM 40 MG/1
40 TABLET, DELAYED RELEASE ORAL
Qty: 30 TABLET | Refills: 0 | Status: SHIPPED | OUTPATIENT
Start: 2022-10-22

## 2022-10-21 RX ORDER — PROPAFENONE HYDROCHLORIDE 225 MG/1
225 TABLET, FILM COATED ORAL EVERY 8 HOURS
Qty: 60 TABLET | Refills: 3 | Status: SHIPPED | OUTPATIENT
Start: 2022-10-21

## 2022-10-21 RX ADMIN — DOXAZOSIN 2 MG: 2 TABLET ORAL at 08:38

## 2022-10-21 RX ADMIN — APIXABAN 5 MG: 5 TABLET, FILM COATED ORAL at 08:38

## 2022-10-21 RX ADMIN — PROPAFENONE HYDROCHLORIDE 225 MG: 150 TABLET, FILM COATED ORAL at 08:37

## 2022-10-21 RX ADMIN — PANTOPRAZOLE SODIUM 40 MG: 40 TABLET, DELAYED RELEASE ORAL at 08:38

## 2022-10-21 NOTE — PROGRESS NOTES
I think he's going to need an antiarrhythmic drug atop his ablation to possibly hold sinus rhythm. Will start propafenone  mg po TID tomorrow AM.  He may need a cardioversion in 1-2 weeks post-initiation. I discussed the role of antiarrhythmic therapy with him earlier after sharing the ablation result with him.     Signed By: Carlos Ha MD     October 20, 2022

## 2022-10-21 NOTE — DISCHARGE INSTRUCTIONS
Patient ID:  Adriana Centeno  [id number]  19 y.o.  1955    Admit Date: 10/20/2022    Discharge Date: 10/21/2022     Admitting Physician: Dr. Bayron Koch     Discharge Physician: Dr. Bayron Koch     Admission Diagnoses:   A-fib [facility]) [I48.91]  Atrial fibrillation [facility]) [I48.91]    Discharge Diagnoses: Active Problems:    S/P ablation of atrial fibrillation (2/15/2022)      Overview: 10/20/22      Atrial fibrillation ([address] (10/20/2022)      Discharge Condition: Good    Cardiology Procedures this Admission:   a fib ablation    Disposition: home      POST-ABLATION DISCHARGE INSTRUCTIONS:      You had an atrial fibrillation ablation with Dr. Bayron Koch on 10/20/22. Call the office [phone] to make an appointment with the nurse practitioner in 2 weeks. Continue your usual medications with the addition of protonix for 1 month and propafenone with length of treatment to be determined. Do not drive, operate any machinery, or sign any legal documents for 24 hours after your procedure. You must have someone to drive you home. You may take a shower 24 hours after your cardiac procedure. Be sure to get the dressing wet and then remove it; gently wash the area with warm soapy water. Pat dry and leave open to air. To help prevent infections, be sure to keep the cath site clean and dry. No lotions, creams, powders, ointments, etc. in the cath site for approximately 1 week. Do not take a tub bath, get in a hot tub or swimming pool for approximately 5 days or until the cath site is completely healed. No strenuous activity or heavy lifting over 20 lbs. for 7 days. After your procedure, some bruising or discomfort is common during the healing process. Tylenol, 1-2 tablets every 6 hours as needed, is recommended if you experience any discomfort.   If you experience any signs or symptoms of infection such as fever, chills, or poorly healing incision, persistent tenderness or swelling in the groin, redness and/or warmth to the touch, numbness, significant tingling or pain at the groin site or affected extremity, rash, drainage from the site, or if the leg feels tight or swollen, call your physician right away. If bleeding at the site occurs, take a clean gauze pad and apply direct pressure to the groin just above the puncture site, and call your physician right away. If your procedure involved ablation therapy, you may feel some mild or vague chest discomfort due to delivery of heat therapy to the heart muscle. This should resolve in 1-2 days. If it gets worse or is associated with shortness of breath, dizziness, loss of consciousness, call your physician right away or call 911 if emergency medical care is needed.      Signed By: Trever Carrion MD     October 21, 2022

## 2022-10-21 NOTE — PROGRESS NOTES
Problem: Falls - Risk of  Goal: *Absence of Falls  Description: Document Radha Tian Fall Risk and appropriate interventions in the flowsheet.   Outcome: Progressing Towards Goal  Note: Fall Risk Interventions:            Medication Interventions: Patient to call before getting OOB, Teach patient to arise slowly                   Problem: Patient Education: Go to Patient Education Activity  Goal: Patient/Family Education  Outcome: Progressing Towards Goal

## 2022-10-21 NOTE — PROGRESS NOTES
Pt ambulated and provided self care with steady gait. Bilateral groin sites are CDI. D/C instructions provided to pt and son both written and verbal. Education included follow-up appointments, new prescriptions, and post-care of bilateral groin incision sites. Pt and son verbalize understanding of instructions. Pt d/c home with son.

## 2023-01-03 ENCOUNTER — TRANSCRIBE ORDER (OUTPATIENT)
Dept: SCHEDULING | Age: 68
End: 2023-01-03

## 2023-01-03 DIAGNOSIS — C61 PROSTATE CANCER (HCC): Primary | ICD-10-CM

## 2023-02-08 ENCOUNTER — HOSPITAL ENCOUNTER (OUTPATIENT)
Dept: PET IMAGING | Age: 68
Discharge: HOME | End: 2023-02-08
Attending: UROLOGY
Payer: MEDICARE

## 2023-02-08 VITALS — WEIGHT: 315 LBS | BODY MASS INDEX: 41.75 KG/M2 | HEIGHT: 73 IN

## 2023-02-08 DIAGNOSIS — C61 PROSTATE CANCER (HCC): ICD-10-CM

## 2023-02-08 PROCEDURE — 78815 PET IMAGE W/CT SKULL-THIGH: CPT

## 2023-02-10 ENCOUNTER — HOSPITAL ENCOUNTER (OUTPATIENT)
Dept: RADIATION THERAPY | Age: 68
Discharge: HOME | End: 2023-02-10

## 2023-02-10 DIAGNOSIS — C61 PROSTATE CANCER (HCC): ICD-10-CM

## 2023-02-10 DIAGNOSIS — Z92.3 HISTORY OF RADIATION THERAPY: ICD-10-CM

## 2023-02-19 ENCOUNTER — HOSPITAL ENCOUNTER (OUTPATIENT)
Dept: MRI IMAGING | Age: 68
Discharge: HOME | End: 2023-02-19
Attending: RADIOLOGY
Payer: MEDICARE

## 2023-02-19 DIAGNOSIS — C61 PROSTATE CANCER (HCC): ICD-10-CM

## 2023-02-19 DIAGNOSIS — Z92.3 HISTORY OF RADIATION THERAPY: ICD-10-CM

## 2023-02-19 PROCEDURE — A9576 INJ PROHANCE MULTIPACK: HCPCS

## 2023-02-19 PROCEDURE — 74011250636 HC RX REV CODE- 250/636

## 2023-02-19 PROCEDURE — 72197 MRI PELVIS W/O & W/DYE: CPT

## 2023-02-19 PROCEDURE — 74011000258 HC RX REV CODE- 258: Performed by: RADIOLOGY

## 2023-02-19 PROCEDURE — 74011000250 HC RX REV CODE- 250: Performed by: RADIOLOGY

## 2023-02-19 RX ORDER — SODIUM CHLORIDE 0.9 % (FLUSH) 0.9 %
10 SYRINGE (ML) INJECTION
Status: COMPLETED | OUTPATIENT
Start: 2023-02-19 | End: 2023-02-19

## 2023-02-19 RX ADMIN — GADOTERIDOL 20 ML: 279.3 INJECTION, SOLUTION INTRAVENOUS at 13:39

## 2023-02-19 RX ADMIN — SODIUM CHLORIDE, PRESERVATIVE FREE 10 ML: 5 INJECTION INTRAVENOUS at 13:40

## 2023-02-19 RX ADMIN — SODIUM CHLORIDE 100 ML: 9 INJECTION, SOLUTION INTRAVENOUS at 13:40

## 2023-07-28 ENCOUNTER — HOSPITAL ENCOUNTER (OUTPATIENT)
Facility: HOSPITAL | Age: 68
Setting detail: OUTPATIENT SURGERY
Discharge: HOME | End: 2023-07-28
Attending: INTERNAL MEDICINE | Admitting: INTERNAL MEDICINE
Payer: MEDICARE

## 2023-07-28 ENCOUNTER — ANESTHESIA EVENT (OUTPATIENT)
Facility: HOSPITAL | Age: 68
End: 2023-07-28
Payer: MEDICARE

## 2023-07-28 ENCOUNTER — ANESTHESIA (OUTPATIENT)
Facility: HOSPITAL | Age: 68
End: 2023-07-28
Payer: MEDICARE

## 2023-07-28 VITALS
SYSTOLIC BLOOD PRESSURE: 109 MMHG | OXYGEN SATURATION: 92 % | BODY MASS INDEX: 42.66 KG/M2 | TEMPERATURE: 98.1 F | DIASTOLIC BLOOD PRESSURE: 52 MMHG | RESPIRATION RATE: 17 BRPM | HEIGHT: 72 IN | WEIGHT: 315 LBS | HEART RATE: 105 BPM

## 2023-07-28 PROBLEM — E66.01 MORBID OBESITY WITH BMI OF 45.0-49.9, ADULT (HCC): Status: ACTIVE | Noted: 2023-07-28

## 2023-07-28 PROCEDURE — 7100000011 HC PHASE II RECOVERY - ADDTL 15 MIN: Performed by: INTERNAL MEDICINE

## 2023-07-28 PROCEDURE — 3600007502: Performed by: INTERNAL MEDICINE

## 2023-07-28 PROCEDURE — 6370000000 HC RX 637 (ALT 250 FOR IP): Performed by: INTERNAL MEDICINE

## 2023-07-28 PROCEDURE — 6360000002 HC RX W HCPCS

## 2023-07-28 PROCEDURE — 7100000010 HC PHASE II RECOVERY - FIRST 15 MIN: Performed by: INTERNAL MEDICINE

## 2023-07-28 PROCEDURE — 2709999900 HC NON-CHARGEABLE SUPPLY: Performed by: INTERNAL MEDICINE

## 2023-07-28 PROCEDURE — 3700000000 HC ANESTHESIA ATTENDED CARE: Performed by: INTERNAL MEDICINE

## 2023-07-28 PROCEDURE — 3600007512: Performed by: INTERNAL MEDICINE

## 2023-07-28 PROCEDURE — 3700000001 HC ADD 15 MINUTES (ANESTHESIA): Performed by: INTERNAL MEDICINE

## 2023-07-28 PROCEDURE — 2580000003 HC RX 258: Performed by: INTERNAL MEDICINE

## 2023-07-28 PROCEDURE — 88305 TISSUE EXAM BY PATHOLOGIST: CPT

## 2023-07-28 RX ORDER — SODIUM CHLORIDE 0.9 % (FLUSH) 0.9 %
5-40 SYRINGE (ML) INJECTION PRN
Status: DISCONTINUED | OUTPATIENT
Start: 2023-07-28 | End: 2023-07-28 | Stop reason: HOSPADM

## 2023-07-28 RX ORDER — SIMETHICONE 20 MG/.3ML
EMULSION ORAL PRN
Status: DISCONTINUED | OUTPATIENT
Start: 2023-07-28 | End: 2023-07-28 | Stop reason: ALTCHOICE

## 2023-07-28 RX ORDER — SODIUM CHLORIDE 0.9 % (FLUSH) 0.9 %
5-40 SYRINGE (ML) INJECTION EVERY 12 HOURS SCHEDULED
Status: DISCONTINUED | OUTPATIENT
Start: 2023-07-28 | End: 2023-07-28 | Stop reason: HOSPADM

## 2023-07-28 RX ORDER — SODIUM CHLORIDE 9 MG/ML
25 INJECTION, SOLUTION INTRAVENOUS PRN
Status: DISCONTINUED | OUTPATIENT
Start: 2023-07-28 | End: 2023-07-28 | Stop reason: HOSPADM

## 2023-07-28 RX ADMIN — PROPOFOL 20 MG: 10 INJECTION, EMULSION INTRAVENOUS at 12:09

## 2023-07-28 RX ADMIN — PROPOFOL 10 MG: 10 INJECTION, EMULSION INTRAVENOUS at 12:11

## 2023-07-28 RX ADMIN — PROPOFOL 100 MG: 10 INJECTION, EMULSION INTRAVENOUS at 12:05

## 2023-07-28 RX ADMIN — PROPOFOL 50 MG: 10 INJECTION, EMULSION INTRAVENOUS at 12:15

## 2023-07-28 RX ADMIN — SODIUM CHLORIDE 25 ML: 9 INJECTION, SOLUTION INTRAVENOUS at 11:35

## 2023-07-28 RX ADMIN — PROPOFOL 20 MG: 10 INJECTION, EMULSION INTRAVENOUS at 12:07

## 2023-07-28 ASSESSMENT — PAIN - FUNCTIONAL ASSESSMENT: PAIN_FUNCTIONAL_ASSESSMENT: NONE - DENIES PAIN

## 2023-07-28 NOTE — H&P
Gastroenterology Outpatient History and Physical    Patient: Kate Hernandez    Physician: Shelby Chinchilla MD    Chief Complaint: +Cologuard  History of Present Illness: No GI complaints    History:  Past Medical History:   Diagnosis Date    Arrhythmia     Afib    Asthma     Cancer [facility])     prostate    Hypertension       Past Surgical History:   Procedure Laterality Date    ORTHOPEDIC SURGERY      L hip replacement    OTHER SURGICAL HISTORY      hematoma evacuation L hip    OTHER SURGICAL HISTORY      cardiac ablation x3      Social History     Socioeconomic History    Marital status:      Spouse name: None    Number of children: None    Years of education: None    Highest education level: None   Tobacco Use    Smoking status: Never    Smokeless tobacco: Never    Tobacco comments:     Quit smoking: chew tobacco   Substance and Sexual Activity    Alcohol use: Yes     Alcohol/week: 10.0 standard drinks     Comment: occasional    Drug use: Never    History reviewed. No pertinent family history. Patient Active Problem List   Diagnosis    S/P ablation of atrial fibrillation    Hypotension during surgery    Atrial fibrillation (HCC)    Prostate cancer ([address])    History of radiation therapy    Morbid obesity with BMI of 45.0-49.9, adult ([address] )       Allergies: Allergies   Allergen Reactions    Penicillins Hives    Statins Other (See Comments)     Muscle aches     Medications:   Prior to Admission medications    Medication Sig Start Date End Date Taking?  Authorizing Provider   albuterol sulfate HFA (PROVENTIL;VENTOLIN;PROAIR) 108 (90 Base) MCG/ACT inhaler Inhale 2 puffs into the lungs every 6 hours as needed    Ar Automatic Reconciliation   apixaban (ELIQUIS) 5 MG TABS tablet Take 1 tablet by mouth 2 times daily    Ar Automatic Reconciliation   doxazosin (CARDURA) 2 MG tablet Take 1 tablet by mouth daily    Ar Automatic Reconciliation   lisinopril (PRINIVIL;ZESTRIL) 40 MG tablet Take 1 tablet by mouth daily

## 2023-07-28 NOTE — PROGRESS NOTES
TRANSFER - IN REPORT:    Verbal report received from Dyan on Irving Golden  being received from endo for routine progression of patient care      Report consisted of patient's Situation, Background, Assessment and   Recommendations(SBAR). Information from the following report(s) Nurse Handoff Report was reviewed with the receiving nurse. Opportunity for questions and clarification was provided. Assessment completed upon patient's arrival to unit and care assumed.

## 2023-07-28 NOTE — ANESTHESIA PRE PROCEDURE
(If Applicable): No results found for: PREGTESTUR, PREGSERUM, HCG, HCGQUANT     ABGs: No results found for: PHART, PO2ART, GFZ2PMJ, AZZ9IHX, BEART, S4DNIWAJ     Type & Screen (If Applicable):  No results found for: LABABO, LABRH    Drug/Infectious Status (If Applicable):  No results found for: HIV, HEPCAB    COVID-19 Screening (If Applicable):   Lab Results   Component Value Date/Time    COVID19 Not detected 02/11/2022 06:28 AM           Anesthesia Evaluation  Patient summary reviewed and Nursing notes reviewed  Airway: Mallampati: III       Comment: Full beard    Mouth opening: > = 3 FB   Dental: normal exam         Pulmonary:Negative Pulmonary ROS and normal exam    (+) asthma:                            Cardiovascular:Negative CV ROS    (+) hypertension:, dysrhythmias: atrial fibrillation,         Rhythm: regular  Rate: normal                 ROS comment: Normal EF and mod MR on echo 2022     Neuro/Psych:   Negative Neuro/Psych ROS              GI/Hepatic/Renal: Neg GI/Hepatic/Renal ROS  (+) morbid obesity          Endo/Other: Negative Endo/Other ROS   (+) malignancy/cancer. Abdominal:             Vascular: negative vascular ROS. 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  consider [employer]).                         Rolly Hewitt MD   7/28/2023

## 2023-07-28 NOTE — ANESTHESIA POSTPROCEDURE EVALUATION
Department of Anesthesiology  Postprocedure Note    Patient: Mariel Stinson  MRN: [id number]  YOB: 1955  Date of evaluation: 7/28/2023      Procedure Summary     Date: 07/28/23 Room / Location: [facility] ENDO 03 / [facility] ENDOSCOPY    Anesthesia Start: 1200 Anesthesia Stop: 1226    Procedure: COLONOSCOPY/BIOPSY/POLYP (Lower GI Region) Diagnosis:       Long term (current) use of anticoagulants      Heme positive stool      (Long term (current) use of anticoagulants [Z79.01])      (Heme positive stool [R19.5])    Surgeons: Samanta Castillo MD Responsible Provider: Beverly Wagner DO    Anesthesia Type: MAC ASA Status: 3          Anesthesia Type: MAC    Adalid Phase I: Adalid Score: 10    Adalid Phase II: Adalid Score: 10      Anesthesia Post Evaluation    Patient location during evaluation: PACU  Patient participation: complete - patient participated  Level of consciousness: awake  Airway patency: patent  Nausea & Vomiting: no vomiting and no nausea  Complications: no  Cardiovascular status: hemodynamically stable  Respiratory status: acceptable  Hydration status: euvolemic

## 2023-07-28 NOTE — PROGRESS NOTES
Endoscopy Case End Note:     Procedure scope was pre-cleaned, per protocol, at bedside by SEBASTIEN Mortensen. Report received from anesthesia. See anesthesia flowsheet for intra-procedure vital signs and events. Belongings remain under stretcher with patient.

## 2023-07-28 NOTE — PROGRESS NOTES
ARRIVAL INFORMATION:  Verified patient name and date of birth, scheduled procedure, and informed consent. : Sharron Rios (spouse) contact number: [phone]  Physician and staff can share information with the . Belongings with patient include:  Clothing,None        GI FOCUSED ASSESSMENT:  Neuro: Awake, alert, oriented x4  Respiratory: even and unlabored   GI: soft and non-distended  EKG Rhythm: atrial fib and PVC's    Education:Reviewed general discharge instructions and  information.

## 2023-07-28 NOTE — OP NOTE
NAME:  Zaid Luciano   :   1955   MRN:   [id number]     Date/Time:  2023 12:20 PM    Colonoscopy Operative Report    Procedure Type:   Colonoscopy with polypectomy (cold snare)     Indications:     Occult blood in stool (+Cologuard)  Pre-operative Diagnosis: see indication above  Post-operative Diagnosis:  See findings below  :  Lyndsay Olivo MD  Referring Provider: --Haim Torres MD    Exam:  Airway: clear, no airway problems anticipated  Heart: RRR, without gallops or rubs  Lungs: clear bilaterally without wheezes, crackles, or rhonchi  Abdomen: soft, nontender, nondistended, bowel sounds present  Mental Status: awake, alert and oriented to person, place and time    Sedation:  MAC anesthesia Propofol  Procedure Details:  After informed consent was obtained with all risks and benefits of procedure explained and preoperative exam completed, the patient was taken to the endoscopy suite and placed in the left lateral decubitus position. Upon sequential sedation as per above, a digital rectal exam was performed demonstrating internal hemorrhoids. The Olympus videocolonoscope  was inserted in the rectum and carefully advanced to the cecum, which was identified by the ileocecal valve and appendiceal orifice. The quality of preparation was good. The colonoscope was slowly withdrawn with careful evaluation between folds. Retroflexion in the rectum was completed demonstrating internal hemorrhoids. Findings:   Two 7-12 mm sessile polyps in sigmoid colon. Removed by cold snare polypectomy  Moderate left-sided diverticulosis  Medium sized internal hemorrhoids seen on retroflexion  Otherwise normal colonoscopy through to the cecum    Specimen Removed:  1. Sigmoid polyps  Complications: None. EBL:  None. Impression:    Two 7-12 mm sessile polyps in sigmoid colon.  Removed by cold snare polypectomy  Moderate left-sided diverticulosis  Medium sized internal hemorrhoids seen on retroflexion  Otherwise normal colonoscopy through to the cecum    Recommendations: Follow up pathology  Repeat colonoscopy in 3 years for surveillance    Discharge Disposition:  Home in the company of a  when able to ambulate.       Kimmy Nguyen MD

## 2023-10-30 ENCOUNTER — HOSPITAL ENCOUNTER (OUTPATIENT)
Facility: HOSPITAL | Age: 68
Discharge: HOME | End: 2023-11-02

## 2023-10-30 VITALS
WEIGHT: 315 LBS | HEART RATE: 58 BPM | DIASTOLIC BLOOD PRESSURE: 101 MMHG | SYSTOLIC BLOOD PRESSURE: 171 MMHG | HEIGHT: 72 IN | BODY MASS INDEX: 42.66 KG/M2

## 2023-10-30 DIAGNOSIS — R97.21 INCREASING PSA LEVEL AFTER TREATMENT FOR PROSTATE CANCER: ICD-10-CM

## 2023-10-30 DIAGNOSIS — Z92.3 HISTORY OF RADIATION THERAPY: ICD-10-CM

## 2023-10-30 DIAGNOSIS — C61 PROSTATE CANCER (HCC): Primary | ICD-10-CM

## 2023-10-30 RX ORDER — ONDANSETRON HYDROCHLORIDE 8 MG/1
8 TABLET, FILM COATED ORAL EVERY 8 HOURS PRN
Qty: 30 TABLET | Refills: 3 | Status: SHIPPED | OUTPATIENT
Start: 2023-10-30

## 2023-10-30 ASSESSMENT — PAIN SCALES - GENERAL: PAINLEVEL_OUTOF10: 0

## 2023-10-30 NOTE — PROGRESS NOTES
prostate    Hypertension          PAST SURGICAL HISTORY:  Past Surgical History:   Procedure Laterality Date    COLONOSCOPY N/A 7/28/2023    COLONOSCOPY/BIOPSY/POLYP performed by Marisabel Angel MD at [facility] ENDOSCOPY    ORTHOPEDIC SURGERY      L hip replacement    OTHER SURGICAL HISTORY      hematoma evacuation L hip    OTHER SURGICAL HISTORY      cardiac ablation x3         FAMILY HISTORY:   Family History   Problem Relation Age of Onset    Prostate Cancer Father        SOCIAL HISTORY:   Social History     Occupational History    Not on file   Tobacco Use    Smoking status: Never    Smokeless tobacco: Never    Tobacco comments:     Quit smoking: chew tobacco   Substance and Sexual Activity    Alcohol use: Not Currently     Comment: rarely    Drug use: Never    Sexual activity: Not on file       ALLERGIES/MEDICATIONS:    Allergies   Allergen Reactions    Penicillins Hives    Statins Other (See Comments)     Muscle aches     Current Outpatient Medications   Medication Sig Dispense Refill    albuterol sulfate HFA (PROVENTIL;VENTOLIN;PROAIR) 108 (90 Base) MCG/ACT inhaler Inhale 2 puffs into the lungs every 6 hours as needed      apixaban (ELIQUIS) 5 MG TABS tablet Take 1 tablet by mouth 2 times daily      doxazosin (CARDURA) 2 MG tablet Take 1 tablet by mouth daily      lisinopril (PRINIVIL;ZESTRIL) 40 MG tablet Take 1 tablet by mouth daily      metoprolol succinate (TOPROL XL) 50 MG extended release tablet Take 1 tablet by mouth daily      pantoprazole (PROTONIX) 40 MG tablet Take 1 tablet by mouth every morning (before breakfast) (Patient not taking: Reported on 10/30/2023)      propafenone (RYTHMOL) 225 MG tablet Take 1 tablet by mouth in the morning and 1 tablet at noon and 1 tablet in the evening. (Patient not taking: Reported on 10/30/2023)       No current facility-administered medications for this encounter.

## 2023-11-19 ENCOUNTER — HOSPITAL ENCOUNTER (OUTPATIENT)
Facility: HOSPITAL | Age: 68
Discharge: HOME | End: 2023-11-22
Attending: RADIOLOGY

## 2023-11-19 LAB
RAD ONC ARIA COURSE FIRST TREATMENT DATE: NORMAL
RAD ONC ARIA COURSE ID: NORMAL
RAD ONC ARIA COURSE LAST TREATMENT DATE: NORMAL
RAD ONC ARIA COURSE SESSION NUMBER: 1
RAD ONC ARIA COURSE TREATMENT ELAPSED DAYS: 0
RAD ONC ARIA PLAN FRACTIONS TREATED TO DATE: 1
RAD ONC ARIA PLAN ID: NORMAL
RAD ONC ARIA PLAN PRESCRIBED DOSE PER FRACTION: 1.8 GY
RAD ONC ARIA PLAN PRIMARY REFERENCE POINT: NORMAL
RAD ONC ARIA PLAN TOTAL FRACTIONS PRESCRIBED: 25
RAD ONC ARIA PLAN TOTAL PRESCRIBED DOSE: 4500 CGY
RAD ONC ARIA REFERENCE POINT DOSAGE GIVEN TO DATE: 1.8 GY
RAD ONC ARIA REFERENCE POINT DOSAGE GIVEN TO DATE: 1.81 GY
RAD ONC ARIA REFERENCE POINT ID: NORMAL
RAD ONC ARIA REFERENCE POINT ID: NORMAL
RAD ONC ARIA REFERENCE POINT SESSION DOSAGE GIVEN: 1.8 GY
RAD ONC ARIA REFERENCE POINT SESSION DOSAGE GIVEN: 1.81 GY

## 2023-11-20 ENCOUNTER — HOSPITAL ENCOUNTER (OUTPATIENT)
Facility: HOSPITAL | Age: 68
Discharge: HOME | End: 2023-11-23
Attending: RADIOLOGY

## 2023-11-20 DIAGNOSIS — C61 PROSTATE CANCER (HCC): Primary | ICD-10-CM

## 2023-11-20 LAB
RAD ONC ARIA COURSE FIRST TREATMENT DATE: NORMAL
RAD ONC ARIA COURSE ID: NORMAL
RAD ONC ARIA COURSE LAST TREATMENT DATE: NORMAL
RAD ONC ARIA COURSE SESSION NUMBER: 2
RAD ONC ARIA COURSE TREATMENT ELAPSED DAYS: 1
RAD ONC ARIA PLAN FRACTIONS TREATED TO DATE: 2
RAD ONC ARIA PLAN ID: NORMAL
RAD ONC ARIA PLAN PRESCRIBED DOSE PER FRACTION: 1.8 GY
RAD ONC ARIA PLAN PRIMARY REFERENCE POINT: NORMAL
RAD ONC ARIA PLAN TOTAL FRACTIONS PRESCRIBED: 25
RAD ONC ARIA PLAN TOTAL PRESCRIBED DOSE: 4500 CGY
RAD ONC ARIA REFERENCE POINT DOSAGE GIVEN TO DATE: 3.6 GY
RAD ONC ARIA REFERENCE POINT DOSAGE GIVEN TO DATE: 3.62 GY
RAD ONC ARIA REFERENCE POINT ID: NORMAL
RAD ONC ARIA REFERENCE POINT ID: NORMAL
RAD ONC ARIA REFERENCE POINT SESSION DOSAGE GIVEN: 1.8 GY
RAD ONC ARIA REFERENCE POINT SESSION DOSAGE GIVEN: 1.81 GY

## 2023-11-20 ASSESSMENT — PATIENT HEALTH QUESTIONNAIRE - PHQ9
SUM OF ALL RESPONSES TO PHQ QUESTIONS 1-9: 0
SUM OF ALL RESPONSES TO PHQ QUESTIONS 1-9: 0
SUM OF ALL RESPONSES TO PHQ9 QUESTIONS 1 & 2: 0
SUM OF ALL RESPONSES TO PHQ QUESTIONS 1-9: 0
2. FEELING DOWN, DEPRESSED OR HOPELESS: 0
SUM OF ALL RESPONSES TO PHQ QUESTIONS 1-9: 0
1. LITTLE INTEREST OR PLEASURE IN DOING THINGS: 0

## 2023-11-20 ASSESSMENT — PAIN SCALES - GENERAL: PAINLEVEL_OUTOF10: 0

## 2023-11-20 NOTE — PROGRESS NOTES
Cancer [place] at [facility]  [facility]      RADIATION ONCOLOGY WEEKLY PROGRESS NOTE    Encounter Date: 11/20/23   Patient Name: Emily Umaña  YOB: 1955  Medical Record Number: [id number]    DIAGNOSIS:     ICD-10-CM    1. Prostate cancer ([address])  C61          STAGING: Cancer Staging   No matching staging information was found for the patient. TREATMENT DETAILS:  Treatment Site Dose/Fx (cGy) #Fx Current Dose (cGy) Total Planned Dose (cGy) Start Date End Date   Pelvic  2/25 360 4500 11/19/23 11/20/23   Pelvic LN SIBs 250 2/25 500 6250 11/19/23 11/20/23     Concurrent ADT    SUBJECTIVE:   Patient seen today for their weekly on treatment evaluation. First OTV at fraction 2 tolerating well, no issues above baseline thus far. Will start ADT w/camcevi 11/22. Went over RT schedule and answered questions. OBJECTIVE/PHYSICAL EXAM:   VITAL SIGNS: There were no vitals taken for this visit.   Wt Readings from Last 5 Encounters:   10/30/23 (!) 162.8 kg (359 lb)   07/28/23 (!) 160.6 kg (354 lb)   02/08/23 (!) 154.2 kg (340 lb)   Pain Level: 0  Performance status:  ECOG 0, fully active, able to carry on all predisease activities without restrictions  General: Alert and conversant, in NAD      LABS:  Personally reviewed    MEDICATIONS:    Current Outpatient Medications:     ondansetron (ZOFRAN) 8 MG tablet, Take 1 tablet by mouth every 8 hours as needed for Nausea or Vomiting, Disp: 30 tablet, Rfl: 3    albuterol sulfate HFA (PROVENTIL;VENTOLIN;PROAIR) 108 (90 Base) MCG/ACT inhaler, Inhale 2 puffs into the lungs every 6 hours as needed, Disp: , Rfl:     apixaban (ELIQUIS) 5 MG TABS tablet, Take 1 tablet by mouth 2 times daily, Disp: , Rfl:     doxazosin (CARDURA) 2 MG tablet, Take 1 tablet by mouth daily, Disp: , Rfl:     lisinopril (PRINIVIL;ZESTRIL) 40 MG tablet, Take 1 tablet by mouth daily, Disp: , Rfl:     metoprolol succinate (TOPROL XL) 50 MG

## 2023-11-21 ENCOUNTER — HOSPITAL ENCOUNTER (OUTPATIENT)
Facility: HOSPITAL | Age: 68
Discharge: HOME | End: 2023-11-24
Attending: RADIOLOGY

## 2023-11-21 LAB
RAD ONC ARIA COURSE FIRST TREATMENT DATE: NORMAL
RAD ONC ARIA COURSE ID: NORMAL
RAD ONC ARIA COURSE LAST TREATMENT DATE: NORMAL
RAD ONC ARIA COURSE SESSION NUMBER: 3
RAD ONC ARIA COURSE TREATMENT ELAPSED DAYS: 2
RAD ONC ARIA PLAN FRACTIONS TREATED TO DATE: 3
RAD ONC ARIA PLAN ID: NORMAL
RAD ONC ARIA PLAN PRESCRIBED DOSE PER FRACTION: 1.8 GY
RAD ONC ARIA PLAN PRIMARY REFERENCE POINT: NORMAL
RAD ONC ARIA PLAN TOTAL FRACTIONS PRESCRIBED: 25
RAD ONC ARIA PLAN TOTAL PRESCRIBED DOSE: 4500 CGY
RAD ONC ARIA REFERENCE POINT DOSAGE GIVEN TO DATE: 5.4 GY
RAD ONC ARIA REFERENCE POINT DOSAGE GIVEN TO DATE: 5.43 GY
RAD ONC ARIA REFERENCE POINT ID: NORMAL
RAD ONC ARIA REFERENCE POINT ID: NORMAL
RAD ONC ARIA REFERENCE POINT SESSION DOSAGE GIVEN: 1.8 GY
RAD ONC ARIA REFERENCE POINT SESSION DOSAGE GIVEN: 1.81 GY

## 2023-11-22 ENCOUNTER — HOSPITAL ENCOUNTER (OUTPATIENT)
Facility: HOSPITAL | Age: 68
Discharge: HOME | End: 2023-11-25
Attending: RADIOLOGY

## 2023-11-22 LAB
RAD ONC ARIA COURSE FIRST TREATMENT DATE: NORMAL
RAD ONC ARIA COURSE ID: NORMAL
RAD ONC ARIA COURSE LAST TREATMENT DATE: NORMAL
RAD ONC ARIA COURSE SESSION NUMBER: 4
RAD ONC ARIA COURSE TREATMENT ELAPSED DAYS: 3
RAD ONC ARIA PLAN FRACTIONS TREATED TO DATE: 4
RAD ONC ARIA PLAN ID: NORMAL
RAD ONC ARIA PLAN PRESCRIBED DOSE PER FRACTION: 1.8 GY
RAD ONC ARIA PLAN PRIMARY REFERENCE POINT: NORMAL
RAD ONC ARIA PLAN TOTAL FRACTIONS PRESCRIBED: 25
RAD ONC ARIA PLAN TOTAL PRESCRIBED DOSE: 4500 CGY
RAD ONC ARIA REFERENCE POINT DOSAGE GIVEN TO DATE: 7.2 GY
RAD ONC ARIA REFERENCE POINT DOSAGE GIVEN TO DATE: 7.24 GY
RAD ONC ARIA REFERENCE POINT ID: NORMAL
RAD ONC ARIA REFERENCE POINT ID: NORMAL
RAD ONC ARIA REFERENCE POINT SESSION DOSAGE GIVEN: 1.8 GY
RAD ONC ARIA REFERENCE POINT SESSION DOSAGE GIVEN: 1.81 GY

## 2023-11-27 ENCOUNTER — HOSPITAL ENCOUNTER (OUTPATIENT)
Facility: HOSPITAL | Age: 68
Discharge: HOME | End: 2023-11-30
Attending: RADIOLOGY

## 2023-11-27 DIAGNOSIS — C61 PROSTATE CANCER (HCC): Primary | ICD-10-CM

## 2023-11-27 LAB
RAD ONC ARIA COURSE FIRST TREATMENT DATE: NORMAL
RAD ONC ARIA COURSE ID: NORMAL
RAD ONC ARIA COURSE LAST TREATMENT DATE: NORMAL
RAD ONC ARIA COURSE SESSION NUMBER: 5
RAD ONC ARIA COURSE TREATMENT ELAPSED DAYS: 8
RAD ONC ARIA PLAN FRACTIONS TREATED TO DATE: 5
RAD ONC ARIA PLAN ID: NORMAL
RAD ONC ARIA PLAN PRESCRIBED DOSE PER FRACTION: 1.8 GY
RAD ONC ARIA PLAN PRIMARY REFERENCE POINT: NORMAL
RAD ONC ARIA PLAN TOTAL FRACTIONS PRESCRIBED: 25
RAD ONC ARIA PLAN TOTAL PRESCRIBED DOSE: 4500 CGY
RAD ONC ARIA REFERENCE POINT DOSAGE GIVEN TO DATE: 9 GY
RAD ONC ARIA REFERENCE POINT DOSAGE GIVEN TO DATE: 9.05 GY
RAD ONC ARIA REFERENCE POINT ID: NORMAL
RAD ONC ARIA REFERENCE POINT ID: NORMAL
RAD ONC ARIA REFERENCE POINT SESSION DOSAGE GIVEN: 1.8 GY
RAD ONC ARIA REFERENCE POINT SESSION DOSAGE GIVEN: 1.81 GY

## 2023-11-27 ASSESSMENT — PAIN SCALES - GENERAL: PAINLEVEL_OUTOF10: 0

## 2023-11-27 NOTE — PROGRESS NOTES
[facility] [place] [facility]  [facility]      RADIATION ONCOLOGY WEEKLY PROGRESS NOTE    Encounter Date: 11/27/23   Patient Name: Chaz Wells  YOB: 1955  Medical Record Number: [id number]    DIAGNOSIS:     ICD-10-CM    1. Prostate cancer ([address])  C61          STAGING:  Cancer Staging   No matching staging information was found for the patient. TREATMENT DETAILS:  Treatment Site Dose/Fx (cGy) #Fx Current Dose (cGy) Total Planned Dose (cGy) Start Date End Date   Pelvic  5/25 900 4500 11/19/23 11/27/23   Pelvic LN SIBs 250 5/25 1250 6250 11/19/23 11/27/23     Concurrent ADT    SUBJECTIVE:   Patient seen today for their weekly on treatment evaluation. At fraction 5 tolerating well, no issues above baseline thus far. Planned for ADT later today w/camcevi. Went over RT schedule and answered questions. OBJECTIVE/PHYSICAL EXAM:   VITAL SIGNS: There were no vitals taken for this visit.   Wt Readings from Last 5 Encounters:   10/30/23 (!) 162.8 kg (359 lb)   07/28/23 (!) 160.6 kg (354 lb)   02/08/23 (!) 154.2 kg (340 lb)   Pain Level: 0  Performance status:  ECOG 0, fully active, able to carry on all predisease activities without restrictions  General: Alert and conversant, in NAD      LABS:  Personally reviewed    MEDICATIONS:    Current Outpatient Medications:     ondansetron (ZOFRAN) 8 MG tablet, Take 1 tablet by mouth every 8 hours as needed for Nausea or Vomiting, Disp: 30 tablet, Rfl: 3    albuterol sulfate HFA (PROVENTIL;VENTOLIN;PROAIR) 108 (90 Base) MCG/ACT inhaler, Inhale 2 puffs into the lungs every 6 hours as needed, Disp: , Rfl:     apixaban (ELIQUIS) 5 MG TABS tablet, Take 1 tablet by mouth 2 times daily, Disp: , Rfl:     doxazosin (CARDURA) 2 MG tablet, Take 1 tablet by mouth daily, Disp: , Rfl:     lisinopril (PRINIVIL;ZESTRIL) 40 MG tablet, Take 1 tablet by mouth daily, Disp: , Rfl:     metoprolol succinate (TOPROL XL) 50 MG

## 2023-11-28 ENCOUNTER — HOSPITAL ENCOUNTER (OUTPATIENT)
Facility: HOSPITAL | Age: 68
Discharge: HOME | End: 2023-12-01
Attending: RADIOLOGY

## 2023-11-28 LAB
RAD ONC ARIA COURSE FIRST TREATMENT DATE: NORMAL
RAD ONC ARIA COURSE ID: NORMAL
RAD ONC ARIA COURSE LAST TREATMENT DATE: NORMAL
RAD ONC ARIA COURSE SESSION NUMBER: 6
RAD ONC ARIA COURSE TREATMENT ELAPSED DAYS: 9
RAD ONC ARIA PLAN FRACTIONS TREATED TO DATE: 6
RAD ONC ARIA PLAN ID: NORMAL
RAD ONC ARIA PLAN PRESCRIBED DOSE PER FRACTION: 1.8 GY
RAD ONC ARIA PLAN PRIMARY REFERENCE POINT: NORMAL
RAD ONC ARIA PLAN TOTAL FRACTIONS PRESCRIBED: 25
RAD ONC ARIA PLAN TOTAL PRESCRIBED DOSE: 4500 CGY
RAD ONC ARIA REFERENCE POINT DOSAGE GIVEN TO DATE: 10.8 GY
RAD ONC ARIA REFERENCE POINT DOSAGE GIVEN TO DATE: 10.86 GY
RAD ONC ARIA REFERENCE POINT ID: NORMAL
RAD ONC ARIA REFERENCE POINT ID: NORMAL
RAD ONC ARIA REFERENCE POINT SESSION DOSAGE GIVEN: 1.8 GY
RAD ONC ARIA REFERENCE POINT SESSION DOSAGE GIVEN: 1.81 GY

## 2023-11-29 ENCOUNTER — HOSPITAL ENCOUNTER (OUTPATIENT)
Facility: HOSPITAL | Age: 68
Discharge: HOME | End: 2023-12-02
Attending: RADIOLOGY

## 2023-11-29 LAB
RAD ONC ARIA COURSE FIRST TREATMENT DATE: NORMAL
RAD ONC ARIA COURSE ID: NORMAL
RAD ONC ARIA COURSE LAST TREATMENT DATE: NORMAL
RAD ONC ARIA COURSE SESSION NUMBER: 7
RAD ONC ARIA COURSE TREATMENT ELAPSED DAYS: 10
RAD ONC ARIA PLAN FRACTIONS TREATED TO DATE: 7
RAD ONC ARIA PLAN ID: NORMAL
RAD ONC ARIA PLAN PRESCRIBED DOSE PER FRACTION: 1.8 GY
RAD ONC ARIA PLAN PRIMARY REFERENCE POINT: NORMAL
RAD ONC ARIA PLAN TOTAL FRACTIONS PRESCRIBED: 25
RAD ONC ARIA PLAN TOTAL PRESCRIBED DOSE: 4500 CGY
RAD ONC ARIA REFERENCE POINT DOSAGE GIVEN TO DATE: 12.6 GY
RAD ONC ARIA REFERENCE POINT DOSAGE GIVEN TO DATE: 12.67 GY
RAD ONC ARIA REFERENCE POINT ID: NORMAL
RAD ONC ARIA REFERENCE POINT ID: NORMAL
RAD ONC ARIA REFERENCE POINT SESSION DOSAGE GIVEN: 1.8 GY
RAD ONC ARIA REFERENCE POINT SESSION DOSAGE GIVEN: 1.81 GY

## 2023-11-30 ENCOUNTER — HOSPITAL ENCOUNTER (OUTPATIENT)
Facility: HOSPITAL | Age: 68
Discharge: HOME | End: 2023-12-03
Attending: RADIOLOGY

## 2023-11-30 LAB
RAD ONC ARIA COURSE FIRST TREATMENT DATE: NORMAL
RAD ONC ARIA COURSE ID: NORMAL
RAD ONC ARIA COURSE LAST TREATMENT DATE: NORMAL
RAD ONC ARIA COURSE SESSION NUMBER: 8
RAD ONC ARIA COURSE TREATMENT ELAPSED DAYS: 11
RAD ONC ARIA PLAN FRACTIONS TREATED TO DATE: 8
RAD ONC ARIA PLAN ID: NORMAL
RAD ONC ARIA PLAN PRESCRIBED DOSE PER FRACTION: 1.8 GY
RAD ONC ARIA PLAN PRIMARY REFERENCE POINT: NORMAL
RAD ONC ARIA PLAN TOTAL FRACTIONS PRESCRIBED: 25
RAD ONC ARIA PLAN TOTAL PRESCRIBED DOSE: 4500 CGY
RAD ONC ARIA REFERENCE POINT DOSAGE GIVEN TO DATE: 14.4 GY
RAD ONC ARIA REFERENCE POINT DOSAGE GIVEN TO DATE: 14.47 GY
RAD ONC ARIA REFERENCE POINT ID: NORMAL
RAD ONC ARIA REFERENCE POINT ID: NORMAL
RAD ONC ARIA REFERENCE POINT SESSION DOSAGE GIVEN: 1.8 GY
RAD ONC ARIA REFERENCE POINT SESSION DOSAGE GIVEN: 1.81 GY

## 2023-12-01 ENCOUNTER — HOSPITAL ENCOUNTER (OUTPATIENT)
Facility: HOSPITAL | Age: 68
Discharge: HOME | End: 2023-12-04
Attending: RADIOLOGY

## 2023-12-01 LAB
RAD ONC ARIA COURSE FIRST TREATMENT DATE: NORMAL
RAD ONC ARIA COURSE ID: NORMAL
RAD ONC ARIA COURSE LAST TREATMENT DATE: NORMAL
RAD ONC ARIA COURSE SESSION NUMBER: 9
RAD ONC ARIA COURSE TREATMENT ELAPSED DAYS: 12
RAD ONC ARIA PLAN FRACTIONS TREATED TO DATE: 9
RAD ONC ARIA PLAN ID: NORMAL
RAD ONC ARIA PLAN PRESCRIBED DOSE PER FRACTION: 1.8 GY
RAD ONC ARIA PLAN PRIMARY REFERENCE POINT: NORMAL
RAD ONC ARIA PLAN TOTAL FRACTIONS PRESCRIBED: 25
RAD ONC ARIA PLAN TOTAL PRESCRIBED DOSE: 4500 CGY
RAD ONC ARIA REFERENCE POINT DOSAGE GIVEN TO DATE: 16.2 GY
RAD ONC ARIA REFERENCE POINT DOSAGE GIVEN TO DATE: 16.28 GY
RAD ONC ARIA REFERENCE POINT ID: NORMAL
RAD ONC ARIA REFERENCE POINT ID: NORMAL
RAD ONC ARIA REFERENCE POINT SESSION DOSAGE GIVEN: 1.8 GY
RAD ONC ARIA REFERENCE POINT SESSION DOSAGE GIVEN: 1.81 GY

## 2023-12-04 ENCOUNTER — HOSPITAL ENCOUNTER (OUTPATIENT)
Facility: HOSPITAL | Age: 68
Discharge: HOME | End: 2023-12-07
Attending: RADIOLOGY

## 2023-12-04 DIAGNOSIS — C61 PROSTATE CANCER (HCC): Primary | ICD-10-CM

## 2023-12-04 LAB
RAD ONC ARIA COURSE FIRST TREATMENT DATE: NORMAL
RAD ONC ARIA COURSE ID: NORMAL
RAD ONC ARIA COURSE LAST TREATMENT DATE: NORMAL
RAD ONC ARIA COURSE SESSION NUMBER: 10
RAD ONC ARIA COURSE TREATMENT ELAPSED DAYS: 15
RAD ONC ARIA PLAN FRACTIONS TREATED TO DATE: 10
RAD ONC ARIA PLAN ID: NORMAL
RAD ONC ARIA PLAN PRESCRIBED DOSE PER FRACTION: 1.8 GY
RAD ONC ARIA PLAN PRIMARY REFERENCE POINT: NORMAL
RAD ONC ARIA PLAN TOTAL FRACTIONS PRESCRIBED: 25
RAD ONC ARIA PLAN TOTAL PRESCRIBED DOSE: 4500 CGY
RAD ONC ARIA REFERENCE POINT DOSAGE GIVEN TO DATE: 18 GY
RAD ONC ARIA REFERENCE POINT DOSAGE GIVEN TO DATE: 18.09 GY
RAD ONC ARIA REFERENCE POINT ID: NORMAL
RAD ONC ARIA REFERENCE POINT ID: NORMAL
RAD ONC ARIA REFERENCE POINT SESSION DOSAGE GIVEN: 1.8 GY
RAD ONC ARIA REFERENCE POINT SESSION DOSAGE GIVEN: 1.81 GY

## 2023-12-04 RX ORDER — ABIRATERONE ACETATE 250 MG/1
4 TABLET ORAL DAILY
COMMUNITY

## 2023-12-04 RX ORDER — PREDNISONE 5 MG/1
TABLET ORAL
COMMUNITY
Start: 2023-11-27

## 2023-12-04 RX ORDER — BICALUTAMIDE 50 MG/1
TABLET, FILM COATED ORAL
COMMUNITY
Start: 2023-11-28

## 2023-12-04 ASSESSMENT — PAIN SCALES - GENERAL: PAINLEVEL_OUTOF10: 0

## 2023-12-04 NOTE — PROGRESS NOTES
Cancer [place] at [facility]  [facility]      RADIATION ONCOLOGY WEEKLY PROGRESS NOTE    Encounter Date: 12/04/23   Patient Name: Jayce Bey  YOB: 1955  Medical Record Number: [id number]    DIAGNOSIS:     ICD-10-CM    1. Prostate cancer ([address])  C61          STAGING:  Cancer Staging   No matching staging information was found for the patient. TREATMENT DETAILS:  Treatment Site Dose/Fx (cGy) #Fx Current Dose (cGy) Total Planned Dose (cGy) Start Date End Date   Pelvic  10/25 1800 4500 11/19/23 12/04/23   Pelvic LN SIBs 250 10/25 2500 6250 11/19/23 12/4/23     Concurrent ADT    SUBJECTIVE:   Patient seen today for their weekly on treatment evaluation. At fraction 10 tolerating well, no issues above baseline thus far. Started ADT with 42mg camcevi on 11/27/23 and started zytiga today. OBJECTIVE/PHYSICAL EXAM:   VITAL SIGNS: There were no vitals taken for this visit.   Wt Readings from Last 5 Encounters:   10/30/23 (!) 162.8 kg (359 lb)   07/28/23 (!) 160.6 kg (354 lb)   02/08/23 (!) 154.2 kg (340 lb)   Pain Level: 0  Performance status:  ECOG 0, fully active, able to carry on all predisease activities without restrictions  General: Alert and conversant, in NAD      LABS:  Personally reviewed    MEDICATIONS:    Current Outpatient Medications:     predniSONE (DELTASONE) 5 MG tablet, , Disp: , Rfl:     bicalutamide (CASODEX) 50 MG chemo tablet, , Disp: , Rfl:     abiraterone acetate (ZYTIGA) 250 MG tablet, Take 4 tablets by mouth daily, Disp: , Rfl:     ondansetron (ZOFRAN) 8 MG tablet, Take 1 tablet by mouth every 8 hours as needed for Nausea or Vomiting, Disp: 30 tablet, Rfl: 3    albuterol sulfate HFA (PROVENTIL;VENTOLIN;PROAIR) 108 (90 Base) MCG/ACT inhaler, Inhale 2 puffs into the lungs every 6 hours as needed, Disp: , Rfl:     apixaban (ELIQUIS) 5 MG TABS tablet, Take 1 tablet by mouth 2 times daily, Disp: , Rfl:     doxazosin

## 2023-12-05 ENCOUNTER — HOSPITAL ENCOUNTER (OUTPATIENT)
Facility: HOSPITAL | Age: 68
Discharge: HOME | End: 2023-12-08
Attending: RADIOLOGY

## 2023-12-05 LAB
RAD ONC ARIA COURSE FIRST TREATMENT DATE: NORMAL
RAD ONC ARIA COURSE ID: NORMAL
RAD ONC ARIA COURSE LAST TREATMENT DATE: NORMAL
RAD ONC ARIA COURSE SESSION NUMBER: 11
RAD ONC ARIA COURSE TREATMENT ELAPSED DAYS: 16
RAD ONC ARIA PLAN FRACTIONS TREATED TO DATE: 11
RAD ONC ARIA PLAN ID: NORMAL
RAD ONC ARIA PLAN PRESCRIBED DOSE PER FRACTION: 1.8 GY
RAD ONC ARIA PLAN PRIMARY REFERENCE POINT: NORMAL
RAD ONC ARIA PLAN TOTAL FRACTIONS PRESCRIBED: 25
RAD ONC ARIA PLAN TOTAL PRESCRIBED DOSE: 4500 CGY
RAD ONC ARIA REFERENCE POINT DOSAGE GIVEN TO DATE: 19.8 GY
RAD ONC ARIA REFERENCE POINT DOSAGE GIVEN TO DATE: 19.9 GY
RAD ONC ARIA REFERENCE POINT ID: NORMAL
RAD ONC ARIA REFERENCE POINT ID: NORMAL
RAD ONC ARIA REFERENCE POINT SESSION DOSAGE GIVEN: 1.8 GY
RAD ONC ARIA REFERENCE POINT SESSION DOSAGE GIVEN: 1.81 GY

## 2023-12-06 ENCOUNTER — HOSPITAL ENCOUNTER (OUTPATIENT)
Facility: HOSPITAL | Age: 68
Discharge: HOME | End: 2023-12-09
Attending: RADIOLOGY

## 2023-12-06 LAB
RAD ONC ARIA COURSE FIRST TREATMENT DATE: NORMAL
RAD ONC ARIA COURSE ID: NORMAL
RAD ONC ARIA COURSE LAST TREATMENT DATE: NORMAL
RAD ONC ARIA COURSE SESSION NUMBER: 12
RAD ONC ARIA COURSE TREATMENT ELAPSED DAYS: 17
RAD ONC ARIA PLAN FRACTIONS TREATED TO DATE: 12
RAD ONC ARIA PLAN ID: NORMAL
RAD ONC ARIA PLAN PRESCRIBED DOSE PER FRACTION: 1.8 GY
RAD ONC ARIA PLAN PRIMARY REFERENCE POINT: NORMAL
RAD ONC ARIA PLAN TOTAL FRACTIONS PRESCRIBED: 25
RAD ONC ARIA PLAN TOTAL PRESCRIBED DOSE: 4500 CGY
RAD ONC ARIA REFERENCE POINT DOSAGE GIVEN TO DATE: 21.6 GY
RAD ONC ARIA REFERENCE POINT DOSAGE GIVEN TO DATE: 21.71 GY
RAD ONC ARIA REFERENCE POINT ID: NORMAL
RAD ONC ARIA REFERENCE POINT ID: NORMAL
RAD ONC ARIA REFERENCE POINT SESSION DOSAGE GIVEN: 1.8 GY
RAD ONC ARIA REFERENCE POINT SESSION DOSAGE GIVEN: 1.81 GY

## 2023-12-07 ENCOUNTER — HOSPITAL ENCOUNTER (OUTPATIENT)
Facility: HOSPITAL | Age: 68
Discharge: HOME | End: 2023-12-10
Attending: RADIOLOGY

## 2023-12-07 LAB
RAD ONC ARIA COURSE FIRST TREATMENT DATE: NORMAL
RAD ONC ARIA COURSE ID: NORMAL
RAD ONC ARIA COURSE LAST TREATMENT DATE: NORMAL
RAD ONC ARIA COURSE SESSION NUMBER: 13
RAD ONC ARIA COURSE TREATMENT ELAPSED DAYS: 18
RAD ONC ARIA PLAN FRACTIONS TREATED TO DATE: 13
RAD ONC ARIA PLAN ID: NORMAL
RAD ONC ARIA PLAN PRESCRIBED DOSE PER FRACTION: 1.8 GY
RAD ONC ARIA PLAN PRIMARY REFERENCE POINT: NORMAL
RAD ONC ARIA PLAN TOTAL FRACTIONS PRESCRIBED: 25
RAD ONC ARIA PLAN TOTAL PRESCRIBED DOSE: 4500 CGY
RAD ONC ARIA REFERENCE POINT DOSAGE GIVEN TO DATE: 23.4 GY
RAD ONC ARIA REFERENCE POINT DOSAGE GIVEN TO DATE: 23.52 GY
RAD ONC ARIA REFERENCE POINT ID: NORMAL
RAD ONC ARIA REFERENCE POINT ID: NORMAL
RAD ONC ARIA REFERENCE POINT SESSION DOSAGE GIVEN: 1.8 GY
RAD ONC ARIA REFERENCE POINT SESSION DOSAGE GIVEN: 1.81 GY

## 2023-12-08 ENCOUNTER — HOSPITAL ENCOUNTER (OUTPATIENT)
Facility: HOSPITAL | Age: 68
Discharge: HOME | End: 2023-12-11
Attending: RADIOLOGY

## 2023-12-08 LAB
RAD ONC ARIA COURSE FIRST TREATMENT DATE: NORMAL
RAD ONC ARIA COURSE ID: NORMAL
RAD ONC ARIA COURSE LAST TREATMENT DATE: NORMAL
RAD ONC ARIA COURSE SESSION NUMBER: 14
RAD ONC ARIA COURSE TREATMENT ELAPSED DAYS: 19
RAD ONC ARIA PLAN FRACTIONS TREATED TO DATE: 14
RAD ONC ARIA PLAN ID: NORMAL
RAD ONC ARIA PLAN PRESCRIBED DOSE PER FRACTION: 1.8 GY
RAD ONC ARIA PLAN PRIMARY REFERENCE POINT: NORMAL
RAD ONC ARIA PLAN TOTAL FRACTIONS PRESCRIBED: 25
RAD ONC ARIA PLAN TOTAL PRESCRIBED DOSE: 4500 CGY
RAD ONC ARIA REFERENCE POINT DOSAGE GIVEN TO DATE: 25.2 GY
RAD ONC ARIA REFERENCE POINT DOSAGE GIVEN TO DATE: 25.33 GY
RAD ONC ARIA REFERENCE POINT ID: NORMAL
RAD ONC ARIA REFERENCE POINT ID: NORMAL
RAD ONC ARIA REFERENCE POINT SESSION DOSAGE GIVEN: 1.8 GY
RAD ONC ARIA REFERENCE POINT SESSION DOSAGE GIVEN: 1.81 GY

## 2023-12-11 ENCOUNTER — HOSPITAL ENCOUNTER (OUTPATIENT)
Facility: HOSPITAL | Age: 68
Discharge: HOME | End: 2023-12-14
Attending: RADIOLOGY

## 2023-12-11 DIAGNOSIS — C61 PROSTATE CANCER (HCC): Primary | ICD-10-CM

## 2023-12-11 LAB
RAD ONC ARIA COURSE FIRST TREATMENT DATE: NORMAL
RAD ONC ARIA COURSE ID: NORMAL
RAD ONC ARIA COURSE LAST TREATMENT DATE: NORMAL
RAD ONC ARIA COURSE SESSION NUMBER: 15
RAD ONC ARIA COURSE TREATMENT ELAPSED DAYS: 22
RAD ONC ARIA PLAN FRACTIONS TREATED TO DATE: 15
RAD ONC ARIA PLAN ID: NORMAL
RAD ONC ARIA PLAN PRESCRIBED DOSE PER FRACTION: 1.8 GY
RAD ONC ARIA PLAN PRIMARY REFERENCE POINT: NORMAL
RAD ONC ARIA PLAN TOTAL FRACTIONS PRESCRIBED: 25
RAD ONC ARIA PLAN TOTAL PRESCRIBED DOSE: 4500 CGY
RAD ONC ARIA REFERENCE POINT DOSAGE GIVEN TO DATE: 27 GY
RAD ONC ARIA REFERENCE POINT DOSAGE GIVEN TO DATE: 27.14 GY
RAD ONC ARIA REFERENCE POINT ID: NORMAL
RAD ONC ARIA REFERENCE POINT ID: NORMAL
RAD ONC ARIA REFERENCE POINT SESSION DOSAGE GIVEN: 1.8 GY
RAD ONC ARIA REFERENCE POINT SESSION DOSAGE GIVEN: 1.81 GY

## 2023-12-11 NOTE — PROGRESS NOTES
Cancer [place] at [facility]  [facility]      RADIATION ONCOLOGY WEEKLY PROGRESS NOTE    Encounter Date: 12/11/23   Patient Name: Emily Umaña  YOB: 1955  Medical Record Number: [id number]    DIAGNOSIS:     ICD-10-CM    1. Prostate cancer ([address])  C61          STAGING:  Cancer Staging   No matching staging information was found for the patient. TREATMENT DETAILS:  Treatment Site Dose/Fx (cGy) #Fx Current Dose (cGy) Total Planned Dose (cGy) Start Date End Date   Pelvic  15/25 2700 4500 11/19/23 12/11/23   Pelvic LN SIBs 250 15/25 3750 6250 11/19/23 12/11/23     Concurrent ADT    SUBJECTIVE:   Patient seen today for their weekly on treatment evaluation. At fraction 15 with grade 1 diarrhea last week, not needing intervention. No changes to  function. Minimal hot flashes/fatigue from ADT. Started ADT with 42mg camcevi on 11/27/23 and started zytiga last week. OBJECTIVE/PHYSICAL EXAM:   VITAL SIGNS: There were no vitals taken for this visit.   Wt Readings from Last 5 Encounters:   10/30/23 (!) 162.8 kg (359 lb)   07/28/23 (!) 160.6 kg (354 lb)   02/08/23 (!) 154.2 kg (340 lb)    Pain Score:   0 - No pain    Performance status:  ECOG 0, fully active, able to carry on all predisease activities without restrictions  General: Alert and conversant, in NAD      LABS:  Personally reviewed    MEDICATIONS:    Current Outpatient Medications:     predniSONE (DELTASONE) 5 MG tablet, , Disp: , Rfl:     bicalutamide (CASODEX) 50 MG chemo tablet, , Disp: , Rfl:     abiraterone acetate (ZYTIGA) 250 MG tablet, Take 4 tablets by mouth daily, Disp: , Rfl:     ondansetron (ZOFRAN) 8 MG tablet, Take 1 tablet by mouth every 8 hours as needed for Nausea or Vomiting, Disp: 30 tablet, Rfl: 3    albuterol sulfate HFA (PROVENTIL;VENTOLIN;PROAIR) 108 (90 Base) MCG/ACT inhaler, Inhale 2 puffs into the lungs every 6 hours as needed, Disp: , Rfl:     apixaban

## 2023-12-12 ENCOUNTER — HOSPITAL ENCOUNTER (OUTPATIENT)
Facility: HOSPITAL | Age: 68
Discharge: HOME | End: 2023-12-15
Attending: RADIOLOGY

## 2023-12-12 LAB
RAD ONC ARIA COURSE FIRST TREATMENT DATE: NORMAL
RAD ONC ARIA COURSE ID: NORMAL
RAD ONC ARIA COURSE LAST TREATMENT DATE: NORMAL
RAD ONC ARIA COURSE SESSION NUMBER: 16
RAD ONC ARIA COURSE TREATMENT ELAPSED DAYS: 23
RAD ONC ARIA PLAN FRACTIONS TREATED TO DATE: 16
RAD ONC ARIA PLAN ID: NORMAL
RAD ONC ARIA PLAN PRESCRIBED DOSE PER FRACTION: 1.8 GY
RAD ONC ARIA PLAN PRIMARY REFERENCE POINT: NORMAL
RAD ONC ARIA PLAN TOTAL FRACTIONS PRESCRIBED: 25
RAD ONC ARIA PLAN TOTAL PRESCRIBED DOSE: 4500 CGY
RAD ONC ARIA REFERENCE POINT DOSAGE GIVEN TO DATE: 28.8 GY
RAD ONC ARIA REFERENCE POINT DOSAGE GIVEN TO DATE: 28.95 GY
RAD ONC ARIA REFERENCE POINT ID: NORMAL
RAD ONC ARIA REFERENCE POINT ID: NORMAL
RAD ONC ARIA REFERENCE POINT SESSION DOSAGE GIVEN: 1.8 GY
RAD ONC ARIA REFERENCE POINT SESSION DOSAGE GIVEN: 1.81 GY

## 2023-12-13 ENCOUNTER — HOSPITAL ENCOUNTER (OUTPATIENT)
Facility: HOSPITAL | Age: 68
Discharge: HOME | End: 2023-12-16
Attending: RADIOLOGY

## 2023-12-13 LAB
RAD ONC ARIA COURSE FIRST TREATMENT DATE: NORMAL
RAD ONC ARIA COURSE ID: NORMAL
RAD ONC ARIA COURSE LAST TREATMENT DATE: NORMAL
RAD ONC ARIA COURSE SESSION NUMBER: 17
RAD ONC ARIA COURSE TREATMENT ELAPSED DAYS: 24
RAD ONC ARIA PLAN FRACTIONS TREATED TO DATE: 17
RAD ONC ARIA PLAN ID: NORMAL
RAD ONC ARIA PLAN PRESCRIBED DOSE PER FRACTION: 1.8 GY
RAD ONC ARIA PLAN PRIMARY REFERENCE POINT: NORMAL
RAD ONC ARIA PLAN TOTAL FRACTIONS PRESCRIBED: 25
RAD ONC ARIA PLAN TOTAL PRESCRIBED DOSE: 4500 CGY
RAD ONC ARIA REFERENCE POINT DOSAGE GIVEN TO DATE: 30.6 GY
RAD ONC ARIA REFERENCE POINT DOSAGE GIVEN TO DATE: 30.76 GY
RAD ONC ARIA REFERENCE POINT ID: NORMAL
RAD ONC ARIA REFERENCE POINT ID: NORMAL
RAD ONC ARIA REFERENCE POINT SESSION DOSAGE GIVEN: 1.8 GY
RAD ONC ARIA REFERENCE POINT SESSION DOSAGE GIVEN: 1.81 GY

## 2023-12-14 LAB
RAD ONC ARIA COURSE FIRST TREATMENT DATE: NORMAL
RAD ONC ARIA COURSE ID: NORMAL
RAD ONC ARIA COURSE LAST TREATMENT DATE: NORMAL
RAD ONC ARIA COURSE SESSION NUMBER: 18
RAD ONC ARIA COURSE TREATMENT ELAPSED DAYS: 25
RAD ONC ARIA PLAN FRACTIONS TREATED TO DATE: 18
RAD ONC ARIA PLAN ID: NORMAL
RAD ONC ARIA PLAN PRESCRIBED DOSE PER FRACTION: 1.8 GY
RAD ONC ARIA PLAN PRIMARY REFERENCE POINT: NORMAL
RAD ONC ARIA PLAN TOTAL FRACTIONS PRESCRIBED: 25
RAD ONC ARIA PLAN TOTAL PRESCRIBED DOSE: 4500 CGY
RAD ONC ARIA REFERENCE POINT DOSAGE GIVEN TO DATE: 32.4 GY
RAD ONC ARIA REFERENCE POINT DOSAGE GIVEN TO DATE: 32.57 GY
RAD ONC ARIA REFERENCE POINT ID: NORMAL
RAD ONC ARIA REFERENCE POINT ID: NORMAL
RAD ONC ARIA REFERENCE POINT SESSION DOSAGE GIVEN: 1.8 GY
RAD ONC ARIA REFERENCE POINT SESSION DOSAGE GIVEN: 1.81 GY

## 2023-12-15 LAB
RAD ONC ARIA COURSE FIRST TREATMENT DATE: NORMAL
RAD ONC ARIA COURSE ID: NORMAL
RAD ONC ARIA COURSE LAST TREATMENT DATE: NORMAL
RAD ONC ARIA COURSE SESSION NUMBER: 19
RAD ONC ARIA COURSE TREATMENT ELAPSED DAYS: 26
RAD ONC ARIA PLAN FRACTIONS TREATED TO DATE: 19
RAD ONC ARIA PLAN ID: NORMAL
RAD ONC ARIA PLAN PRESCRIBED DOSE PER FRACTION: 1.8 GY
RAD ONC ARIA PLAN PRIMARY REFERENCE POINT: NORMAL
RAD ONC ARIA PLAN TOTAL FRACTIONS PRESCRIBED: 25
RAD ONC ARIA PLAN TOTAL PRESCRIBED DOSE: 4500 CGY
RAD ONC ARIA REFERENCE POINT DOSAGE GIVEN TO DATE: 34.2 GY
RAD ONC ARIA REFERENCE POINT DOSAGE GIVEN TO DATE: 34.38 GY
RAD ONC ARIA REFERENCE POINT ID: NORMAL
RAD ONC ARIA REFERENCE POINT ID: NORMAL
RAD ONC ARIA REFERENCE POINT SESSION DOSAGE GIVEN: 1.8 GY
RAD ONC ARIA REFERENCE POINT SESSION DOSAGE GIVEN: 1.81 GY

## 2023-12-21 ENCOUNTER — HOSPITAL ENCOUNTER (OUTPATIENT)
Facility: HOSPITAL | Age: 68
Discharge: HOME | End: 2023-12-24
Attending: RADIOLOGY

## 2023-12-22 ENCOUNTER — HOSPITAL ENCOUNTER (OUTPATIENT)
Facility: HOSPITAL | Age: 68
Discharge: HOME | End: 2023-12-25
Attending: RADIOLOGY

## 2023-12-26 ENCOUNTER — HOSPITAL ENCOUNTER (OUTPATIENT)
Facility: HOSPITAL | Age: 68
Discharge: HOME | End: 2023-12-29
Attending: RADIOLOGY

## 2023-12-26 DIAGNOSIS — C61 PROSTATE CANCER (HCC): Primary | ICD-10-CM

## 2023-12-26 LAB
RAD ONC ARIA COURSE FIRST TREATMENT DATE: NORMAL
RAD ONC ARIA COURSE ID: NORMAL
RAD ONC ARIA COURSE LAST TREATMENT DATE: NORMAL
RAD ONC ARIA COURSE SESSION NUMBER: 25
RAD ONC ARIA COURSE TREATMENT ELAPSED DAYS: 37
RAD ONC ARIA PLAN FRACTIONS TREATED TO DATE: 25
RAD ONC ARIA PLAN ID: NORMAL
RAD ONC ARIA PLAN PRESCRIBED DOSE PER FRACTION: 1.8 GY
RAD ONC ARIA PLAN PRIMARY REFERENCE POINT: NORMAL
RAD ONC ARIA PLAN TOTAL FRACTIONS PRESCRIBED: 25
RAD ONC ARIA PLAN TOTAL PRESCRIBED DOSE: 4500 CGY
RAD ONC ARIA REFERENCE POINT DOSAGE GIVEN TO DATE: 45 GY
RAD ONC ARIA REFERENCE POINT DOSAGE GIVEN TO DATE: 45.23 GY
RAD ONC ARIA REFERENCE POINT ID: NORMAL
RAD ONC ARIA REFERENCE POINT ID: NORMAL
RAD ONC ARIA REFERENCE POINT SESSION DOSAGE GIVEN: 1.8 GY
RAD ONC ARIA REFERENCE POINT SESSION DOSAGE GIVEN: 1.81 GY

## 2023-12-26 NOTE — PROGRESS NOTES
(ZYTIGA) 250 MG tablet, Take 4 tablets by mouth daily, Disp: , Rfl:     ondansetron (ZOFRAN) 8 MG tablet, Take 1 tablet by mouth every 8 hours as needed for Nausea or Vomiting, Disp: 30 tablet, Rfl: 3    albuterol sulfate HFA (PROVENTIL;VENTOLIN;PROAIR) 108 (90 Base) MCG/ACT inhaler, Inhale 2 puffs into the lungs every 6 hours as needed, Disp: , Rfl:     apixaban (ELIQUIS) 5 MG TABS tablet, Take 1 tablet by mouth 2 times daily, Disp: , Rfl:     doxazosin (CARDURA) 2 MG tablet, Take 1 tablet by mouth daily, Disp: , Rfl:     lisinopril (PRINIVIL;ZESTRIL) 40 MG tablet, Take 1 tablet by mouth daily, Disp: , Rfl:     metoprolol succinate (TOPROL XL) 50 MG extended release tablet, Take 1 tablet by mouth daily, Disp: , Rfl:     pantoprazole (PROTONIX) 40 MG tablet, Take 1 tablet by mouth every morning (before breakfast) (Patient not taking: Reported on 10/30/2023), Disp: , Rfl:     propafenone (RYTHMOL) 225 MG tablet, Take 1 tablet by mouth in the morning and 1 tablet at noon and 1 tablet in the evening. (Patient not taking: Reported on 10/30/2023), Disp: , Rfl:       ASSESSMENT/PLAN:   - Completed treatment as planned  - Treatment setup and plan reviewed. Port images/CBCT images reviewed. Appropriate laboratory work was reviewed. - Treatment side effects and toxicities reviewed with the patient, and appropriate management was advised.        Rafael Lockett MD  [facility]  [address]  P: 210 [address], [address] [place]  P: [phone]  PATIENT’S [address], [address] [facility], [address]  P: [phone]

## 2023-12-28 ENCOUNTER — CLINICAL DOCUMENTATION (OUTPATIENT)
Facility: HOSPITAL | Age: 68
End: 2023-12-28

## 2023-12-28 NOTE — PROGRESS NOTES
metoprolol succinate (TOPROL XL) 50 MG extended release tablet, Take 1 tablet by mouth daily, Disp: , Rfl:     pantoprazole (PROTONIX) 40 MG tablet, Take 1 tablet by mouth every morning (before breakfast) (Patient not taking: Reported on 10/30/2023), Disp: , Rfl:     propafenone (RYTHMOL) 225 MG tablet, Take 1 tablet by mouth in the morning and 1 tablet at noon and 1 tablet in the evening. (Patient not taking: Reported on 10/30/2023), Disp: , Rfl:       UNDER-TREATMENT COURSE SUMMARY:   Overall tolerated treatment well. Experienced grade 1 hot flashes and grade 1 fatigue from the ADT. Also experienced grade 1-2 diarrhea. Imodium discussed but not taken throughout treatment. No changes to  function. Otherwise no other significant side effects. FOLLOW UP:   3 months        Thank you for the opportunity to participate in Mr. Morales's care.       Marija Campbell MD  [facility]  [address]  P: 210 [address] [address]  P: 20 [facility]  [address] [facility], [address]  P: [phone]

## 2024-06-11 ENCOUNTER — HOSPITAL ENCOUNTER (OUTPATIENT)
Facility: HOSPITAL | Age: 69
Discharge: HOME | End: 2024-06-14
Attending: UROLOGY
Payer: MEDICARE

## 2024-06-11 DIAGNOSIS — Z79.818 ANDROGEN DEPRIVATION THERAPY: ICD-10-CM

## 2024-06-11 PROCEDURE — 77080 DXA BONE DENSITY AXIAL: CPT

## 2025-01-04 ENCOUNTER — APPOINTMENT (OUTPATIENT)
Facility: HOSPITAL | Age: 70
End: 2025-01-04
Payer: MEDICARE

## 2025-01-04 ENCOUNTER — HOSPITAL ENCOUNTER (INPATIENT)
Facility: HOSPITAL | Age: 70
LOS: 12 days | Discharge: NURSING, REHAB OR LONG-TERM CARE | End: 2025-01-16
Attending: STUDENT IN AN ORGANIZED HEALTH CARE EDUCATION/TRAINING PROGRAM | Admitting: INTERNAL MEDICINE
Payer: MEDICARE

## 2025-01-04 DIAGNOSIS — R60.1 ANASARCA: Primary | ICD-10-CM

## 2025-01-04 DIAGNOSIS — I50.43 ACUTE ON CHRONIC COMBINED SYSTOLIC AND DIASTOLIC CONGESTIVE HEART FAILURE (HCC): ICD-10-CM

## 2025-01-04 PROBLEM — R06.02 SOB (SHORTNESS OF BREATH): Status: ACTIVE | Noted: 2025-01-04

## 2025-01-04 LAB
ALBUMIN SERPL-MCNC: 3.1 G/DL (ref 3.5–5)
ALBUMIN/GLOB SERPL: 1.1 (ref 1.1–2.2)
ALP SERPL-CCNC: 104 U/L (ref 45–117)
ALT SERPL-CCNC: 17 U/L (ref 12–78)
ANION GAP SERPL CALC-SCNC: 6 MMOL/L (ref 2–12)
AST SERPL-CCNC: 18 U/L (ref 15–37)
BASOPHILS # BLD: 0.1 K/UL (ref 0–0.1)
BASOPHILS NFR BLD: 1 % (ref 0–1)
BILIRUB SERPL-MCNC: 0.8 MG/DL (ref 0.2–1)
BUN SERPL-MCNC: 13 MG/DL (ref 6–20)
BUN/CREAT SERPL: 10 (ref 12–20)
CALCIUM SERPL-MCNC: 8.5 MG/DL (ref 8.5–10.1)
CHLORIDE SERPL-SCNC: 103 MMOL/L (ref 97–108)
CO2 SERPL-SCNC: 34 MMOL/L (ref 21–32)
CREAT SERPL-MCNC: 1.28 MG/DL (ref 0.7–1.3)
DIFFERENTIAL METHOD BLD: ABNORMAL
EOSINOPHIL # BLD: 0.9 K/UL (ref 0–0.4)
EOSINOPHIL NFR BLD: 15 % (ref 0–7)
ERYTHROCYTE [DISTWIDTH] IN BLOOD BY AUTOMATED COUNT: 15.9 % (ref 11.5–14.5)
GLOBULIN SER CALC-MCNC: 2.8 G/DL (ref 2–4)
GLUCOSE SERPL-MCNC: 110 MG/DL (ref 65–100)
HCT VFR BLD AUTO: 36.6 % (ref 36.6–50.3)
HGB BLD-MCNC: 11 G/DL (ref 12.1–17)
IMM GRANULOCYTES # BLD AUTO: 0 K/UL (ref 0–0.04)
IMM GRANULOCYTES NFR BLD AUTO: 0 % (ref 0–0.5)
LYMPHOCYTES # BLD: 0.9 K/UL (ref 0.8–3.5)
LYMPHOCYTES NFR BLD: 15 % (ref 12–49)
MCH RBC QN AUTO: 27.7 PG (ref 26–34)
MCHC RBC AUTO-ENTMCNC: 30.1 G/DL (ref 30–36.5)
MCV RBC AUTO: 92.2 FL (ref 80–99)
MONOCYTES # BLD: 0.6 K/UL (ref 0–1)
MONOCYTES NFR BLD: 9 % (ref 5–13)
NEUTS SEG # BLD: 3.8 K/UL (ref 1.8–8)
NEUTS SEG NFR BLD: 60 % (ref 32–75)
NRBC # BLD: 0 K/UL (ref 0–0.01)
NRBC BLD-RTO: 0 PER 100 WBC
NT PRO BNP: 3388 PG/ML
PLATELET # BLD AUTO: 275 K/UL (ref 150–400)
PMV BLD AUTO: 10.2 FL (ref 8.9–12.9)
POTASSIUM SERPL-SCNC: 4.3 MMOL/L (ref 3.5–5.1)
PROT SERPL-MCNC: 5.9 G/DL (ref 6.4–8.2)
RBC # BLD AUTO: 3.97 M/UL (ref 4.1–5.7)
RBC MORPH BLD: ABNORMAL
SODIUM SERPL-SCNC: 143 MMOL/L (ref 136–145)
TROPONIN I SERPL HS-MCNC: 58 NG/L (ref 0–76)
WBC # BLD AUTO: 6.3 K/UL (ref 4.1–11.1)

## 2025-01-04 PROCEDURE — 80053 COMPREHEN METABOLIC PANEL: CPT

## 2025-01-04 PROCEDURE — 99285 EMERGENCY DEPT VISIT HI MDM: CPT

## 2025-01-04 PROCEDURE — 36415 COLL VENOUS BLD VENIPUNCTURE: CPT

## 2025-01-04 PROCEDURE — 51798 US URINE CAPACITY MEASURE: CPT

## 2025-01-04 PROCEDURE — 6360000002 HC RX W HCPCS: Performed by: STUDENT IN AN ORGANIZED HEALTH CARE EDUCATION/TRAINING PROGRAM

## 2025-01-04 PROCEDURE — 1100000000 HC RM PRIVATE

## 2025-01-04 PROCEDURE — 83880 ASSAY OF NATRIURETIC PEPTIDE: CPT

## 2025-01-04 PROCEDURE — 6370000000 HC RX 637 (ALT 250 FOR IP): Performed by: INTERNAL MEDICINE

## 2025-01-04 PROCEDURE — 96374 THER/PROPH/DIAG INJ IV PUSH: CPT

## 2025-01-04 PROCEDURE — 71046 X-RAY EXAM CHEST 2 VIEWS: CPT

## 2025-01-04 PROCEDURE — 84484 ASSAY OF TROPONIN QUANT: CPT

## 2025-01-04 PROCEDURE — 6360000002 HC RX W HCPCS: Performed by: INTERNAL MEDICINE

## 2025-01-04 PROCEDURE — 2500000003 HC RX 250 WO HCPCS: Performed by: INTERNAL MEDICINE

## 2025-01-04 PROCEDURE — 85025 COMPLETE CBC W/AUTO DIFF WBC: CPT

## 2025-01-04 PROCEDURE — 93005 ELECTROCARDIOGRAM TRACING: CPT

## 2025-01-04 RX ORDER — METOPROLOL SUCCINATE 50 MG/1
100 TABLET, EXTENDED RELEASE ORAL DAILY
Status: DISCONTINUED | OUTPATIENT
Start: 2025-01-05 | End: 2025-01-16 | Stop reason: HOSPADM

## 2025-01-04 RX ORDER — PROPAFENONE HYDROCHLORIDE 150 MG/1
225 TABLET, COATED ORAL EVERY 8 HOURS
Status: DISCONTINUED | OUTPATIENT
Start: 2025-01-04 | End: 2025-01-04

## 2025-01-04 RX ORDER — PREDNISONE 5 MG/1
5 TABLET ORAL DAILY
Status: DISCONTINUED | OUTPATIENT
Start: 2025-01-04 | End: 2025-01-04

## 2025-01-04 RX ORDER — POTASSIUM CHLORIDE 7.45 MG/ML
10 INJECTION INTRAVENOUS PRN
Status: DISCONTINUED | OUTPATIENT
Start: 2025-01-04 | End: 2025-01-04

## 2025-01-04 RX ORDER — ONDANSETRON 4 MG/1
8 TABLET, ORALLY DISINTEGRATING ORAL EVERY 8 HOURS PRN
Status: DISCONTINUED | OUTPATIENT
Start: 2025-01-04 | End: 2025-01-06 | Stop reason: SDUPTHER

## 2025-01-04 RX ORDER — POLYETHYLENE GLYCOL 3350 17 G/17G
17 POWDER, FOR SOLUTION ORAL DAILY PRN
Status: DISCONTINUED | OUTPATIENT
Start: 2025-01-04 | End: 2025-01-16 | Stop reason: HOSPADM

## 2025-01-04 RX ORDER — ALBUTEROL SULFATE 0.83 MG/ML
2.5 SOLUTION RESPIRATORY (INHALATION) EVERY 6 HOURS PRN
Status: DISCONTINUED | OUTPATIENT
Start: 2025-01-04 | End: 2025-01-05

## 2025-01-04 RX ORDER — POTASSIUM CHLORIDE 1500 MG/1
40 TABLET, EXTENDED RELEASE ORAL PRN
Status: DISCONTINUED | OUTPATIENT
Start: 2025-01-04 | End: 2025-01-04

## 2025-01-04 RX ORDER — FUROSEMIDE 10 MG/ML
20 INJECTION INTRAMUSCULAR; INTRAVENOUS 2 TIMES DAILY
Status: DISCONTINUED | OUTPATIENT
Start: 2025-01-04 | End: 2025-01-04

## 2025-01-04 RX ORDER — ACETAMINOPHEN 650 MG/1
650 SUPPOSITORY RECTAL EVERY 6 HOURS PRN
Status: DISCONTINUED | OUTPATIENT
Start: 2025-01-04 | End: 2025-01-16 | Stop reason: HOSPADM

## 2025-01-04 RX ORDER — METOLAZONE 5 MG/1
5 TABLET ORAL EVERY MORNING
Status: DISCONTINUED | OUTPATIENT
Start: 2025-01-05 | End: 2025-01-07

## 2025-01-04 RX ORDER — LISINOPRIL 20 MG/1
40 TABLET ORAL DAILY
Status: DISCONTINUED | OUTPATIENT
Start: 2025-01-04 | End: 2025-01-07

## 2025-01-04 RX ORDER — METOPROLOL SUCCINATE 50 MG/1
50 TABLET, EXTENDED RELEASE ORAL DAILY
Status: DISCONTINUED | OUTPATIENT
Start: 2025-01-04 | End: 2025-01-04

## 2025-01-04 RX ORDER — ABIRATERONE ACETATE 250 MG/1
1000 TABLET ORAL DAILY
Status: DISCONTINUED | OUTPATIENT
Start: 2025-01-04 | End: 2025-01-04

## 2025-01-04 RX ORDER — ALBUTEROL SULFATE 90 UG/1
2 INHALANT RESPIRATORY (INHALATION) EVERY 6 HOURS PRN
Status: DISCONTINUED | OUTPATIENT
Start: 2025-01-04 | End: 2025-01-04

## 2025-01-04 RX ORDER — FUROSEMIDE 10 MG/ML
40 INJECTION INTRAMUSCULAR; INTRAVENOUS 2 TIMES DAILY
Status: DISCONTINUED | OUTPATIENT
Start: 2025-01-05 | End: 2025-01-07

## 2025-01-04 RX ORDER — SODIUM CHLORIDE 0.9 % (FLUSH) 0.9 %
5-40 SYRINGE (ML) INJECTION PRN
Status: DISCONTINUED | OUTPATIENT
Start: 2025-01-04 | End: 2025-01-16 | Stop reason: HOSPADM

## 2025-01-04 RX ORDER — DOXAZOSIN 2 MG/1
2 TABLET ORAL DAILY
Status: DISCONTINUED | OUTPATIENT
Start: 2025-01-04 | End: 2025-01-16 | Stop reason: HOSPADM

## 2025-01-04 RX ORDER — ONDANSETRON 4 MG/1
4 TABLET, ORALLY DISINTEGRATING ORAL EVERY 8 HOURS PRN
Status: DISCONTINUED | OUTPATIENT
Start: 2025-01-04 | End: 2025-01-06

## 2025-01-04 RX ORDER — SODIUM CHLORIDE 0.9 % (FLUSH) 0.9 %
5-40 SYRINGE (ML) INJECTION EVERY 12 HOURS SCHEDULED
Status: DISCONTINUED | OUTPATIENT
Start: 2025-01-04 | End: 2025-01-16 | Stop reason: HOSPADM

## 2025-01-04 RX ORDER — FUROSEMIDE 10 MG/ML
20 INJECTION INTRAMUSCULAR; INTRAVENOUS ONCE
Status: COMPLETED | OUTPATIENT
Start: 2025-01-04 | End: 2025-01-04

## 2025-01-04 RX ORDER — SODIUM CHLORIDE 9 MG/ML
INJECTION, SOLUTION INTRAVENOUS PRN
Status: DISCONTINUED | OUTPATIENT
Start: 2025-01-04 | End: 2025-01-16 | Stop reason: HOSPADM

## 2025-01-04 RX ORDER — MAGNESIUM SULFATE IN WATER 40 MG/ML
2000 INJECTION, SOLUTION INTRAVENOUS PRN
Status: DISCONTINUED | OUTPATIENT
Start: 2025-01-04 | End: 2025-01-04

## 2025-01-04 RX ORDER — ONDANSETRON 2 MG/ML
4 INJECTION INTRAMUSCULAR; INTRAVENOUS EVERY 6 HOURS PRN
Status: DISCONTINUED | OUTPATIENT
Start: 2025-01-04 | End: 2025-01-06

## 2025-01-04 RX ORDER — ACETAMINOPHEN 325 MG/1
650 TABLET ORAL EVERY 6 HOURS PRN
Status: DISCONTINUED | OUTPATIENT
Start: 2025-01-04 | End: 2025-01-16 | Stop reason: HOSPADM

## 2025-01-04 RX ORDER — BICALUTAMIDE 50 MG/1
50 TABLET, FILM COATED ORAL DAILY
Status: DISCONTINUED | OUTPATIENT
Start: 2025-01-04 | End: 2025-01-04

## 2025-01-04 RX ADMIN — APIXABAN 5 MG: 5 TABLET, FILM COATED ORAL at 20:01

## 2025-01-04 RX ADMIN — SODIUM CHLORIDE, PRESERVATIVE FREE 10 ML: 5 INJECTION INTRAVENOUS at 20:02

## 2025-01-04 RX ADMIN — FUROSEMIDE 20 MG: 10 INJECTION, SOLUTION INTRAMUSCULAR; INTRAVENOUS at 09:58

## 2025-01-04 RX ADMIN — FUROSEMIDE 20 MG: 10 INJECTION, SOLUTION INTRAMUSCULAR; INTRAVENOUS at 17:42

## 2025-01-04 ASSESSMENT — PAIN - FUNCTIONAL ASSESSMENT
PAIN_FUNCTIONAL_ASSESSMENT: NONE - DENIES PAIN
PAIN_FUNCTIONAL_ASSESSMENT: NONE - DENIES PAIN

## 2025-01-04 NOTE — ED PROVIDER NOTES
[facility] EMERGENCY DEPT  EMERGENCY DEPARTMENT HISTORY AND PHYSICAL EXAM    Date: 1/4/2025  Patient Name: Piter Morales  MRN: [id number]  Birthdate 1955  Date of evaluation: 1/4/2025  Provider: Angie Kulkarni MD   Note Started: 7:44 AM EST 1/4/25    HISTORY OF PRESENT ILLNESS     Chief Complaint   Patient presents with    Groin Swelling     Pt BIBEMS with a cc of groin swelling and bilateral feet swelling x 2 weeks; pt was on prednisone but recently stopped taking it; pt is also complaining of itching all over        History Provided By: Patient    HPI: Piter Morales is a 69 y.o. male w/ a-fib on eliquis s/p ablation 2022, prostate cancer s/p oral chemo and radiation, recent termination of chronic steroid use, obesity, HTN, and asthma presenting for evaluation of leg swelling and itchiness for several weeks. Patient was recently taken off of 2 medications: prednisone 3 wks ago and an additional prostate cancer medication (patient cannot recall name) approx 5 wks ago. He notes approx 5 wks of worsening edema in BLE tracking up to his groin and abdomen gradually. He notes subsequently developing intense pruritus to the same area and has been itching the area, noting it feels dry and skin feels swollen. He notes burning pain in the bilateral lower extremities to area of most edema. He notes swelling in both testicles without pain and that his penis has been retracted secondary to the swelling to where he must sit to urinate. No dysuria or purulent discharge. Lower extremities have been weeping, though no purulent discharge. He denies dyspnea, orthopnea, chest pain, scrotal pain, urinary sxs, abdominal pain, calf pain, n/v, and all other sxs at this time. Patient is not currently on a diuretic. Has been adherent to all other medications including his anticoagulant.     PAST MEDICAL HISTORY   Past Medical History:  Past Medical History:   Diagnosis Date    Arrhythmia     Afib    Asthma     Cancer (HCC)      and treated this patient under the direct supervision of an attending physician, Dr. Marcos.    Angie Kulkarni MD  [facility] PGY-2

## 2025-01-04 NOTE — PROGRESS NOTES
End of Shift Note    Bedside shift change report given to NEHA Mak (oncoming nurse) by MICHAEL BOSS RN (offgoing nurse).  Report included the following information SBAR, Kardex, and MAR    Shift worked:  5:04pm-7pm     Shift summary and any significant changes:     Pt arrived to this unit from the ED; report received from NEHA Bellamy. Pt tolerated care fairly well. Medications were given and education was provided. Hourly rounding completed. Pt made no complaints of pain during this shift. Pt up x1 to the bathroom. Cardio consult to be called tomorrow.      Concerns for physician to address:  N/A     Zone phone for oncoming shift:   [phone]       Activity:  Level of Assistance: Minimal assist, patient does 75% or more  Number times ambulated in hallways past shift: 0  Number of times OOB to chair past shift: 0    Cardiac:   Cardiac Monitoring: Yes      Cardiac Rhythm: Atrial fib    Access:  Current line(s): PIV     Genitourinary:        Respiratory:   O2 Device: None (Room air)  Chronic home O2 use?: NO  Incentive spirometer at bedside: N/A    GI:  Last BM (including prior to admit): 01/04/25  Current diet:  ADULT DIET; Regular; Low Fat/Low Chol/High Fiber/JEFE; Low Sodium (2 gm)  Passing flatus: YES    Pain Management:   Patient states pain is manageable on current regimen: N/A    Skin:  Cong Scale Score: 20  Interventions: Wound Offloading (Prevention Methods): Bed, pressure reduction mattress, Elevate heels, Pillows, Repositioning, Turning    Patient Safety:  Fall Risk: Nursing Judgement-Fall Risk High(Add Comments): Yes  Fall Risk Interventions  Nursing Judgement-Fall Risk High(Add Comments): Yes  Toilet Every 2 Hours-In Advance of Need: Yes  Hourly Visual Checks: Awake, In bed  Fall Visual Posted: Fall sign posted, Socks  Room Door Open: Yes  Alarm On: Bed  Patient Moved Closer to Nursing Station: No    Active Consults:   IP CONSULT TO PHARMACY  IP CONSULT TO CARDIOLOGY    Length of Stay:  Expected LOS:

## 2025-01-04 NOTE — PROGRESS NOTES
Report called to ONC NEHA Celestin patient to transport to [facility]

## 2025-01-05 ENCOUNTER — APPOINTMENT (OUTPATIENT)
Facility: HOSPITAL | Age: 70
End: 2025-01-05
Payer: MEDICARE

## 2025-01-05 PROCEDURE — 6370000000 HC RX 637 (ALT 250 FOR IP): Performed by: INTERNAL MEDICINE

## 2025-01-05 PROCEDURE — 2500000003 HC RX 250 WO HCPCS: Performed by: INTERNAL MEDICINE

## 2025-01-05 PROCEDURE — 2700000000 HC OXYGEN THERAPY PER DAY

## 2025-01-05 PROCEDURE — 6360000004 HC RX CONTRAST MEDICATION: Performed by: INTERNAL MEDICINE

## 2025-01-05 PROCEDURE — 6360000002 HC RX W HCPCS: Performed by: NURSE PRACTITIONER

## 2025-01-05 PROCEDURE — 1100000000 HC RM PRIVATE

## 2025-01-05 PROCEDURE — 94640 AIRWAY INHALATION TREATMENT: CPT

## 2025-01-05 PROCEDURE — 6360000002 HC RX W HCPCS: Performed by: INTERNAL MEDICINE

## 2025-01-05 PROCEDURE — 74177 CT ABD & PELVIS W/CONTRAST: CPT

## 2025-01-05 PROCEDURE — 94760 N-INVAS EAR/PLS OXIMETRY 1: CPT

## 2025-01-05 RX ORDER — IOPAMIDOL 755 MG/ML
100 INJECTION, SOLUTION INTRAVASCULAR
Status: COMPLETED | OUTPATIENT
Start: 2025-01-05 | End: 2025-01-05

## 2025-01-05 RX ORDER — HYDROXYZINE HYDROCHLORIDE 25 MG/1
25 TABLET, FILM COATED ORAL 4 TIMES DAILY PRN
Status: DISCONTINUED | OUTPATIENT
Start: 2025-01-05 | End: 2025-01-16 | Stop reason: HOSPADM

## 2025-01-05 RX ORDER — LEVALBUTEROL INHALATION SOLUTION 1.25 MG/3ML
1.25 SOLUTION RESPIRATORY (INHALATION) ONCE
Status: COMPLETED | OUTPATIENT
Start: 2025-01-05 | End: 2025-01-05

## 2025-01-05 RX ORDER — FLUTICASONE FUROATE AND VILANTEROL 100; 25 UG/1; UG/1
1 POWDER RESPIRATORY (INHALATION) DAILY
COMMUNITY

## 2025-01-05 RX ORDER — MINERAL OIL/PETROLATUM,WHITE
CREAM (GRAM) TOPICAL 2 TIMES DAILY
Status: DISCONTINUED | OUTPATIENT
Start: 2025-01-05 | End: 2025-01-16 | Stop reason: HOSPADM

## 2025-01-05 RX ORDER — ALBUTEROL SULFATE 90 UG/1
2 INHALANT RESPIRATORY (INHALATION) EVERY 6 HOURS PRN
Status: DISCONTINUED | OUTPATIENT
Start: 2025-01-05 | End: 2025-01-16 | Stop reason: HOSPADM

## 2025-01-05 RX ADMIN — SODIUM CHLORIDE, PRESERVATIVE FREE 10 ML: 5 INJECTION INTRAVENOUS at 09:13

## 2025-01-05 RX ADMIN — FUROSEMIDE 40 MG: 10 INJECTION, SOLUTION INTRAMUSCULAR; INTRAVENOUS at 09:13

## 2025-01-05 RX ADMIN — FUROSEMIDE 40 MG: 10 INJECTION, SOLUTION INTRAMUSCULAR; INTRAVENOUS at 17:49

## 2025-01-05 RX ADMIN — DOXAZOSIN 2 MG: 2 TABLET ORAL at 09:13

## 2025-01-05 RX ADMIN — APIXABAN 5 MG: 5 TABLET, FILM COATED ORAL at 09:13

## 2025-01-05 RX ADMIN — APIXABAN 5 MG: 5 TABLET, FILM COATED ORAL at 21:19

## 2025-01-05 RX ADMIN — ACETAMINOPHEN 650 MG: 325 TABLET ORAL at 21:19

## 2025-01-05 RX ADMIN — HYDROXYZINE HYDROCHLORIDE 25 MG: 25 TABLET, FILM COATED ORAL at 16:35

## 2025-01-05 RX ADMIN — METOPROLOL SUCCINATE 100 MG: 50 TABLET, EXTENDED RELEASE ORAL at 09:13

## 2025-01-05 RX ADMIN — METOLAZONE 5 MG: 5 TABLET ORAL at 09:13

## 2025-01-05 RX ADMIN — LEVALBUTEROL HYDROCHLORIDE 1.25 MG: 1.25 SOLUTION RESPIRATORY (INHALATION) at 00:40

## 2025-01-05 RX ADMIN — IOPAMIDOL 100 ML: 755 INJECTION, SOLUTION INTRAVENOUS at 08:49

## 2025-01-05 RX ADMIN — SODIUM CHLORIDE, PRESERVATIVE FREE 10 ML: 5 INJECTION INTRAVENOUS at 21:21

## 2025-01-05 NOTE — PROGRESS NOTES
End of Shift Note    Bedside shift change report given to NEHA Simpson (oncoming nurse) by PROMISE GENTILE RN (offgoing nurse).  Report included the following information SBAR, Kardex, Intake/Output, and MAR    Shift worked:  6081-8154     Shift summary and any significant changes:     Patient had no complaints of pain or nausea. Pt had 1 episode of tachycardia related to dyspnea. 1 time order for xopenex given by respiratory therapist. Pt felt better after and was able to rest. Scheduled medications given per MAR.      Concerns for physician to address:    Zone phone for oncoming shift:       Activity:  Level of Assistance: Independent  Number times ambulated in hallways past shift: 0  Number of times OOB to chair past shift: 1    Cardiac:   Cardiac Monitoring: Yes      Cardiac Rhythm: Atrial fib    Access:  Current line(s): PIV     Genitourinary:        Respiratory:   O2 Device: None (Room air)  Chronic home O2 use?: NO  Incentive spirometer at bedside: N/A    GI:  Last BM (including prior to admit): 01/04/25  Current diet:  ADULT DIET; Regular; Low Fat/Low Chol/High Fiber/JEFE; Low Sodium (2 gm)  Passing flatus: YES    Pain Management:   Patient states pain is manageable on current regimen: N/A    Skin:  Cong Scale Score: 20  Interventions: Wound Offloading (Prevention Methods): Turning, Repositioning    Patient Safety:  Fall Risk: Nursing Judgement-Fall Risk High(Add Comments): Yes  Fall Risk Interventions  Nursing Judgement-Fall Risk High(Add Comments): Yes  Toilet Every 2 Hours-In Advance of Need: Yes  Hourly Visual Checks: Awake, In bed  Fall Visual Posted: Fall sign posted, Socks  Room Door Open: Yes  Alarm On: Bed  Patient Moved Closer to Nursing Station: No    Active Consults:   IP CONSULT TO PHARMACY  IP CONSULT TO CARDIOLOGY    Length of Stay:  Expected LOS: 4  Actual LOS: 1    PROMISE GENTILE, NEHA

## 2025-01-05 NOTE — PROGRESS NOTES
Nursing contacted Nocturnist/cross cover provider via non-urgent messaging system [email or web address] and notified patient wheezing hr up likely from dyspnea, states RT bedside, asking for xopenex neb instead with hr up, nurse reported pt sitting in bed hr 120s at present, NAD reported. No other concerns reported. No acute distress reported. No other information provided by nurse. VSS.     Ordered xopenex neb x1 due to present HR elevation, likely hr elevated in setting 2/2 dyspnea, as no acute distress and no other concerns reported at present and vss otherwise RT to give the xopenex, will not change the nebs as hr elevation didn't occur 2/2 nebs, out of concern for further tachy added x1 as above. Will defer further evaluation/management to the day shift primary attending care team. Patient denies any further complaints or concerns.     Nursing to notify Hospitalist for further/continued concerns. Will remain available overnight for further concerns if nursing/patient needs. Please note, there are RRT systems in this hospital in place that if nursing has acute or critical patient condition change or concern, this is to help facilitate and notify that patient needs immediate bedside evaluation by a provider.     Non-billable note.

## 2025-01-05 NOTE — PROGRESS NOTES
Pharmacy Medication Reconciliation     The patient was interviewed regarding current PTA medication list, use and drug allergies;  patient present in room and obtained permission from patient to discuss drug regimen with visitor(s) present. The patient was questioned regarding use of any other inhalers, topical products, over the counter medications, herbal medications, vitamin products or ophthalmic/nasal/otic medication use.     Allergy Update: Penicillins and Statins    Recommendations/Findings:   The following amendments were made to the patient's active medication list on file at [facility]:   1) Additions:   +breo inhaler  2) Deletions:   -abiraterone  -bicalutamide  -doxazosin  -ondansetron prn  -pantorpazole  -prednisone  -propafenone  3) Changes:   Toprol is 100mg daily  Pertinent Findings:   -albuterol nebs make patient anxious, will order inhaler.     Clarified PTA med list with rx query, patient interview. PTA medication list was corrected to the following:     Prior to Admission Medications   Prescriptions Last Dose Informant   albuterol sulfate HFA (PROVENTIL;VENTOLIN;PROAIR) 108 (90 Base) MCG/ACT inhaler Past Week Self   Sig: Inhale 2 puffs into the lungs every 6 hours as needed   apixaban (ELIQUIS) 5 MG TABS tablet 1/4/2025 Self   Sig: Take 1 tablet by mouth 2 times daily   fluticasone furoate-vilanterol (BREO ELLIPTA) 100-25 MCG/ACT inhaler 1/4/2025 Self   Sig: Inhale 1 puff into the lungs daily   lisinopril (PRINIVIL;ZESTRIL) 40 MG tablet 1/4/2025 Self   Sig: Take 1 tablet by mouth daily   metoprolol succinate (TOPROL XL) 50 MG extended release tablet 1/4/2025 Self   Sig: Take 2 tablets by mouth daily      Facility-Administered Medications: None        Thank you,  Raoul Mercedes RPH

## 2025-01-05 NOTE — PLAN OF CARE
Problem: Safety - Adult  Goal: Free from fall injury  1/4/2025 2320 by Aubree Youngblood, RN  Outcome: Progressing  1/4/2025 1806 by Tatiana Wright RN  Outcome: Progressing

## 2025-01-05 NOTE — PROGRESS NOTES
End of Shift Note    Bedside shift change report given to NEHA Vital (oncoming nurse) by MICHAEL BOSS RN (offgoing nurse).  Report included the following information SBAR, Kardex, and MAR    Shift worked:  7am-7pm     Shift summary and any significant changes:     Pt tolerated care fairly well. Medications were given and education was provided. PRN Hydroxyzine given x1 for itching. Hourly rounding completed. Pt made no complaints of pain during this shift. Pt up x1 to the bathroom. Nurse attempted to call cardiology consult and was on hold for over 20 minutes with no response. Hat placed in toilet for strict I's and O's.      Concerns for physician to address:  N/A     Zone phone for oncoming shift:   [phone]       Activity:  Level of Assistance: Independent  Number times ambulated in hallways past shift: 0  Number of times OOB to chair past shift: 0    Cardiac:   Cardiac Monitoring: Yes      Cardiac Rhythm: Atrial fib    Access:  Current line(s): PIV     Genitourinary:        Respiratory:   O2 Device: Nasal cannula  Chronic home O2 use?: NO  Incentive spirometer at bedside: N/A    GI:  Last BM (including prior to admit): 01/04/25  Current diet:  ADULT DIET; Regular; Low Fat/Low Chol/High Fiber/JEFE; Low Sodium (2 gm)  Passing flatus: YES    Pain Management:   Patient states pain is manageable on current regimen: N/A    Skin:  Cong Scale Score: 20  Interventions: Wound Offloading (Prevention Methods): Turning, Repositioning    Patient Safety:  Fall Risk: Nursing Judgement-Fall Risk High(Add Comments): Yes  Fall Risk Interventions  Nursing Judgement-Fall Risk High(Add Comments): Yes  Toilet Every 2 Hours-In Advance of Need: Yes  Hourly Visual Checks: Awake, In bed  Fall Visual Posted: Fall sign posted, Socks  Room Door Open: Yes  Alarm On: Bed  Patient Moved Closer to Nursing Station: No    Active Consults:   IP CONSULT TO PHARMACY  IP CONSULT TO CARDIOLOGY    Length of Stay:  Expected LOS: 4  Actual LOS:

## 2025-01-06 ENCOUNTER — APPOINTMENT (OUTPATIENT)
Facility: HOSPITAL | Age: 70
End: 2025-01-06
Attending: INTERNAL MEDICINE
Payer: MEDICARE

## 2025-01-06 LAB
ANION GAP SERPL CALC-SCNC: 5 MMOL/L (ref 2–12)
BUN SERPL-MCNC: 18 MG/DL (ref 6–20)
BUN/CREAT SERPL: 11 (ref 12–20)
CALCIUM SERPL-MCNC: 8.3 MG/DL (ref 8.5–10.1)
CHLORIDE SERPL-SCNC: 95 MMOL/L (ref 97–108)
CO2 SERPL-SCNC: 37 MMOL/L (ref 21–32)
CREAT SERPL-MCNC: 1.58 MG/DL (ref 0.7–1.3)
ECHO AV AREA PEAK VELOCITY: 1.4 CM2
ECHO AV AREA VTI: 1.4 CM2
ECHO AV AREA/BSA PEAK VELOCITY: 0.5 CM2/M2
ECHO AV AREA/BSA VTI: 0.5 CM2/M2
ECHO AV MEAN GRADIENT: 8 MMHG
ECHO AV MEAN VELOCITY: 1.3 M/S
ECHO AV PEAK GRADIENT: 14 MMHG
ECHO AV PEAK VELOCITY: 1.9 M/S
ECHO AV VELOCITY RATIO: 0.42
ECHO AV VTI: 34.1 CM
ECHO BSA: 3 M2
ECHO LA VOL A-L A4C: 90 ML (ref 18–58)
ECHO LA VOL MOD A4C: 80 ML (ref 18–58)
ECHO LA VOLUME INDEX A-L A4C: 32 ML/M2 (ref 16–34)
ECHO LA VOLUME INDEX MOD A4C: 28 ML/M2 (ref 16–34)
ECHO LV EDV A4C: 176 ML
ECHO LV EDV INDEX A4C: 62 ML/M2
ECHO LV EF PHYSICIAN: 58 %
ECHO LV EJECTION FRACTION A4C: 60 %
ECHO LV ESV A4C: 70 ML
ECHO LV ESV INDEX A4C: 25 ML/M2
ECHO LVOT AREA: 3.1 CM2
ECHO LVOT AV VTI INDEX: 0.42
ECHO LVOT DIAM: 2 CM
ECHO LVOT MEAN GRADIENT: 2 MMHG
ECHO LVOT PEAK GRADIENT: 3 MMHG
ECHO LVOT PEAK VELOCITY: 0.8 M/S
ECHO LVOT STROKE VOLUME INDEX: 15.9 ML/M2
ECHO LVOT SV: 45.2 ML
ECHO LVOT VTI: 14.4 CM
ECHO MV AREA VTI: 2.2 CM2
ECHO MV LVOT VTI INDEX: 1.42
ECHO MV MAX VELOCITY: 1.2 M/S
ECHO MV MEAN GRADIENT: 3 MMHG
ECHO MV MEAN VELOCITY: 0.8 M/S
ECHO MV PEAK GRADIENT: 6 MMHG
ECHO MV REGURGITANT PEAK GRADIENT: 67 MMHG
ECHO MV REGURGITANT PEAK VELOCITY: 4.1 M/S
ECHO MV VTI: 20.4 CM
ECHO RA VOLUME: 145 ML
ECHO RA VOLUME: 157 ML
EKG ATRIAL RATE: 144 BPM
EKG DIAGNOSIS: NORMAL
EKG Q-T INTERVAL: 366 MS
EKG QRS DURATION: 138 MS
EKG QTC CALCULATION (BAZETT): 510 MS
EKG R AXIS: -31 DEGREES
EKG T AXIS: 15 DEGREES
EKG VENTRICULAR RATE: 117 BPM
GLUCOSE SERPL-MCNC: 109 MG/DL (ref 65–100)
POTASSIUM SERPL-SCNC: 3.5 MMOL/L (ref 3.5–5.1)
SODIUM SERPL-SCNC: 137 MMOL/L (ref 136–145)

## 2025-01-06 PROCEDURE — 36415 COLL VENOUS BLD VENIPUNCTURE: CPT

## 2025-01-06 PROCEDURE — 2700000000 HC OXYGEN THERAPY PER DAY

## 2025-01-06 PROCEDURE — 94760 N-INVAS EAR/PLS OXIMETRY 1: CPT

## 2025-01-06 PROCEDURE — C8924 2D TTE W OR W/O FOL W/CON,FU: HCPCS

## 2025-01-06 PROCEDURE — 80048 BASIC METABOLIC PNL TOTAL CA: CPT

## 2025-01-06 PROCEDURE — 2500000003 HC RX 250 WO HCPCS: Performed by: INTERNAL MEDICINE

## 2025-01-06 PROCEDURE — 6370000000 HC RX 637 (ALT 250 FOR IP): Performed by: INTERNAL MEDICINE

## 2025-01-06 PROCEDURE — 6360000004 HC RX CONTRAST MEDICATION: Performed by: INTERNAL MEDICINE

## 2025-01-06 PROCEDURE — 6360000002 HC RX W HCPCS: Performed by: INTERNAL MEDICINE

## 2025-01-06 PROCEDURE — 1100000000 HC RM PRIVATE

## 2025-01-06 RX ORDER — ONDANSETRON 2 MG/ML
4 INJECTION INTRAMUSCULAR; INTRAVENOUS EVERY 6 HOURS PRN
Status: DISCONTINUED | OUTPATIENT
Start: 2025-01-06 | End: 2025-01-16 | Stop reason: HOSPADM

## 2025-01-06 RX ORDER — LORAZEPAM 2 MG/ML
0.5 INJECTION INTRAMUSCULAR ONCE
Status: COMPLETED | OUTPATIENT
Start: 2025-01-06 | End: 2025-01-06

## 2025-01-06 RX ORDER — ONDANSETRON 4 MG/1
8 TABLET, ORALLY DISINTEGRATING ORAL EVERY 8 HOURS PRN
Status: DISCONTINUED | OUTPATIENT
Start: 2025-01-06 | End: 2025-01-16 | Stop reason: HOSPADM

## 2025-01-06 RX ADMIN — APIXABAN 5 MG: 5 TABLET, FILM COATED ORAL at 08:38

## 2025-01-06 RX ADMIN — FUROSEMIDE 40 MG: 10 INJECTION, SOLUTION INTRAMUSCULAR; INTRAVENOUS at 18:21

## 2025-01-06 RX ADMIN — METOPROLOL SUCCINATE 100 MG: 50 TABLET, EXTENDED RELEASE ORAL at 08:38

## 2025-01-06 RX ADMIN — SKIN PROTECTANT: 33 OINTMENT TOPICAL at 08:38

## 2025-01-06 RX ADMIN — PERFLUTREN 1.5 ML: 6.52 INJECTION, SUSPENSION INTRAVENOUS at 14:06

## 2025-01-06 RX ADMIN — SKIN PROTECTANT: 33 OINTMENT TOPICAL at 19:17

## 2025-01-06 RX ADMIN — APIXABAN 5 MG: 5 TABLET, FILM COATED ORAL at 21:25

## 2025-01-06 RX ADMIN — LORAZEPAM 0.5 MG: 2 INJECTION INTRAMUSCULAR; INTRAVENOUS at 19:13

## 2025-01-06 RX ADMIN — FUROSEMIDE 40 MG: 10 INJECTION, SOLUTION INTRAMUSCULAR; INTRAVENOUS at 08:38

## 2025-01-06 RX ADMIN — ALBUTEROL SULFATE 2 PUFF: 90 AEROSOL, METERED RESPIRATORY (INHALATION) at 15:26

## 2025-01-06 RX ADMIN — DOXAZOSIN 2 MG: 2 TABLET ORAL at 08:38

## 2025-01-06 RX ADMIN — SODIUM CHLORIDE, PRESERVATIVE FREE 10 ML: 5 INJECTION INTRAVENOUS at 19:14

## 2025-01-06 RX ADMIN — SODIUM CHLORIDE, PRESERVATIVE FREE 10 ML: 5 INJECTION INTRAVENOUS at 08:38

## 2025-01-06 RX ADMIN — METOLAZONE 5 MG: 5 TABLET ORAL at 08:38

## 2025-01-06 ASSESSMENT — PAIN SCALES - GENERAL: PAINLEVEL_OUTOF10: 0

## 2025-01-06 NOTE — PROGRESS NOTES
End of Shift Note    Bedside shift change report given to NEHA Lazo (oncoming nurse) by PROMISE GENTILE RN (offgoing nurse).  Report included the following information SBAR    Shift worked:  10am-7p     Shift summary and any significant changes:     Patient had ECHO done. No complaints. Pt used albuterol inhaler once. Scheduled medications given per MAR. Pt maintained on 4L NC of O2. Patients hands began to tremor and became progressively worse throughout the end of shift, MD made aware, one time dose of ativan given.      Concerns for physician to address:       Zone phone for oncoming shift:   [phone]       Activity:  Level of Assistance: Independent  Number times ambulated in hallways past shift: 0  Number of times OOB to chair past shift: 0    Cardiac:   Cardiac Monitoring: Yes      Cardiac Rhythm: Atrial fib    Access:  Current line(s): PIV     Genitourinary:   Urinary Status: Voiding, Bathroom privileges    Respiratory:   O2 Device: Nasal cannula  Chronic home O2 use?: NO  Incentive spirometer at bedside: NO    GI:  Last BM (including prior to admit): 01/05/25  Current diet:  ADULT DIET; Regular; Low Fat/Low Chol/High Fiber/JEFE; Low Sodium (2 gm)  Passing flatus: YES    Pain Management:   Patient states pain is manageable on current regimen: YES    Skin:  Cong Scale Score: 19  Interventions: Wound Offloading (Prevention Methods): Repositioning    Patient Safety:  Fall Risk: Nursing Judgement-Fall Risk High(Add Comments): Yes  Fall Risk Interventions  Nursing Judgement-Fall Risk High(Add Comments): Yes  Toilet Every 2 Hours-In Advance of Need: Yes  Hourly Visual Checks: Awake, In bed  Fall Visual Posted: Fall sign posted, Socks  Room Door Open: Yes  Alarm On: Bed  Patient Moved Closer to Nursing Station: No    Active Consults:   IP CONSULT TO PHARMACY  IP CONSULT TO CARDIOLOGY    Length of Stay:  Expected LOS: 4  Actual LOS: 2    PROMISE GENTILE, NEHA

## 2025-01-06 NOTE — PROGRESS NOTES
End of Shift Note    Bedside shift change report given to Aubree (oncoming nurse) by Rahel Vidales RN (offgoing nurse).  Report included the following information SBAR    Shift worked:  7-10am     Shift summary and any significant changes:     none     Concerns for physician to address:  Run of vtach per telemetry     Zone phone for oncoming shift:   [phone]       Activity:  Level of Assistance: Independent  Number times ambulated in hallways past shift: 0  Number of times OOB to chair past shift: 0    Cardiac:   Cardiac Monitoring: Yes      Cardiac Rhythm: Atrial fib    Access:  Current line(s): PIV     Genitourinary:   Urinary Status: Voiding, Bathroom privileges    Respiratory:   O2 Device: Nasal cannula  Chronic home O2 use?: NO  Incentive spirometer at bedside: NO    GI:  Last BM (including prior to admit): 01/05/25  Current diet:  ADULT DIET; Regular; Low Fat/Low Chol/High Fiber/JEFE; Low Sodium (2 gm)  Passing flatus: YES    Pain Management:   Patient states pain is manageable on current regimen: YES    Skin:  Cong Scale Score: 19  Interventions: Wound Offloading (Prevention Methods): Repositioning    Patient Safety:  Fall Risk: Nursing Judgement-Fall Risk High(Add Comments): Yes  Fall Risk Interventions  Nursing Judgement-Fall Risk High(Add Comments): Yes  Toilet Every 2 Hours-In Advance of Need: Yes  Hourly Visual Checks: Awake, In bed  Fall Visual Posted: Fall sign posted, Socks  Room Door Open: Yes  Alarm On: Bed  Patient Moved Closer to Nursing Station: No    Active Consults:   IP CONSULT TO PHARMACY  IP CONSULT TO CARDIOLOGY    Length of Stay:  Expected LOS: 4  Actual LOS: 2    Rahel Vidales, RN

## 2025-01-06 NOTE — PROGRESS NOTES
End of Shift Note    Bedside shift change report given to NEHA Mcginnis (oncoming nurse) by Zahraa Pozo RN (offgoing nurse).  Report included the following information SBAR, Kardex, and MAR    Shift worked:  nights     Shift summary and any significant changes:    The patient has edema throughout the body: bilateral lower extremities (+4), bilateral upper extremities (+1), scrotum, and abdomen. Oxygen saturation was in the 80s, and a 4L nasal cannula was placed, increasing saturation to 95%. Eliquis was given for A-fib. The patient is independent, ambulates around the room, and is voiding without issues. I&O was documented accordingly. Skin assessment revealed redness to the sacrum (blanchable), with redness and scratches on all extremities and abdomen.   Concerns for physician to address:  N/A     [employer] phone for oncoming shift:   [phone]       Activity:  Level of Assistance: Independent  Number times ambulated in hallways past shift: 0  Number of times OOB to chair past shift: 0    Cardiac:   Cardiac Monitoring: Yes      Cardiac Rhythm: Atrial fib    Access:  Current line(s): PIV     Genitourinary:   Urinary Status: Voiding, Bathroom privileges    Respiratory:   O2 Device: Nasal cannula  Chronic home O2 use?: NO  Incentive spirometer at bedside: N/A    GI:  Last BM (including prior to admit): 01/05/25  Current diet:  ADULT DIET; Regular; Low Fat/Low Chol/High Fiber/JEFE; Low Sodium (2 gm)  Passing flatus: YES    Pain Management:   Patient states pain is manageable on current regimen: N/A    Skin:  Cong Scale Score: 19  Interventions: Wound Offloading (Prevention Methods): Bed, pressure reduction mattress, Pillows, Repositioning    Patient Safety:  Fall Risk: Nursing Judgement-Fall Risk High(Add Comments): Yes  Fall Risk Interventions  Nursing Judgement-Fall Risk High(Add Comments): Yes  Toilet Every 2 Hours-In Advance of Need: Yes  Hourly Visual Checks: Awake, In bed  Fall Visual Posted: Fall sign posted,

## 2025-01-06 NOTE — PROGRESS NOTES
Hospitalist Progress Note    NAME:   Piter Morales   : 1955   MRN: [id number]     Date/Time: 2025 10:04 PM  Patient PCP: Jenni Ortiz MD    Estimated discharge date:  Barriers:       Assessment / Plan:  New onset lower extremity/scrotal/abdominal edema possibly secondary to CHF exacerbation triggered by A-fib  -Significant diuresis, significant improvement  - I reviewed Dr. Mata note . He is diagnosed with A-fib but he was not diagnosed with CHF,  -It is unclear to me if the patient has a paroxysmal atrial fibrillation or persistent atrial fibrillation  -Last ablation was in   -Chest x-ray did not show evidence of pulmonary edema lungs were clear to auscultation, BNP is elevated at 3000  - Proceed with CT of abdomen and pelvis with contrast, echocardiogram  -Lasix 40 mg twice daily 25 mg daily, metolazone 5 mg daily  - Cardiology consult  - Daily weight  -Strict RODRICK's     History of A-fib  - Currently r heart rate in the 100  Continue Eliquis,   cont beta-blocker     Hypoalbuminemia   possibly secondary to hepatic congestion     Hypertension  - Fairly controlled   -Hold lisinopril so room available for diuresis     Asthma  - No wheezing        History of prostate cancer  - Status post radiation, currently patient is not taking any medication  - prostate cancer treated with ADT     LUTS   on doxazosin                  Medical Decision Making:   I personally reviewed labs:  I personally reviewed imaging:  I personally reviewed EKG:  Toxic drug monitoring:   Discussed case with:         Code Status:   DVT Prophylaxis:   GI Prophylaxis:    Subjective:     Chief Complaint / Reason for Physician Visit  Discussed with RN events overnight.       Objective:     VITALS:   Last 24hrs VS reviewed since prior progress note. Most recent are:  Patient Vitals for the past 24 hrs:   BP Temp Temp src Pulse Resp SpO2   25 2016 103/68 -- -- (!) 123 18 91 %   25 1749 (!) 122/93 -- -- (!) 110

## 2025-01-06 NOTE — PLAN OF CARE
Problem: Safety - Adult  Goal: Free from fall injury  1/6/2025 0256 by Zahraa Pozo, RN  Outcome: Progressing  1/5/2025 1444 by Tatiana Wright, RN  Outcome: Progressing

## 2025-01-07 ENCOUNTER — APPOINTMENT (OUTPATIENT)
Facility: HOSPITAL | Age: 70
End: 2025-01-07
Payer: MEDICARE

## 2025-01-07 LAB
AMORPH CRY URNS QL MICRO: ABNORMAL
ANION GAP SERPL CALC-SCNC: 6 MMOL/L (ref 2–12)
APPEARANCE UR: ABNORMAL
BACTERIA URNS QL MICRO: NEGATIVE /HPF
BASE EXCESS BLDV CALC-SCNC: 10.3 MMOL/L
BASOPHILS # BLD: 0.07 K/UL (ref 0–0.1)
BASOPHILS NFR BLD: 1 % (ref 0–1)
BDY SITE: ABNORMAL
BILIRUB UR QL CFM: NEGATIVE
BUN SERPL-MCNC: 27 MG/DL (ref 6–20)
BUN/CREAT SERPL: 9 (ref 12–20)
CALCIUM SERPL-MCNC: 7.8 MG/DL (ref 8.5–10.1)
CHLORIDE SERPL-SCNC: 94 MMOL/L (ref 97–108)
CO2 SERPL-SCNC: 36 MMOL/L (ref 21–32)
COLOR UR: ABNORMAL
CREAT SERPL-MCNC: 3.09 MG/DL (ref 0.7–1.3)
DIFFERENTIAL METHOD BLD: ABNORMAL
ECHO BSA: 3 M2
EOSINOPHIL # BLD: 1.26 K/UL (ref 0–0.4)
EOSINOPHIL NFR BLD: 17 % (ref 0–7)
EPITH CASTS URNS QL MICRO: ABNORMAL /LPF
ERYTHROCYTE [DISTWIDTH] IN BLOOD BY AUTOMATED COUNT: 15.9 % (ref 11.5–14.5)
GLUCOSE SERPL-MCNC: 113 MG/DL (ref 65–100)
GLUCOSE UR STRIP.AUTO-MCNC: NEGATIVE MG/DL
HCO3 BLDV-SCNC: 41 MMOL/L (ref 23–28)
HCT VFR BLD AUTO: 32.3 % (ref 36.6–50.3)
HGB BLD-MCNC: 9.8 G/DL (ref 12.1–17)
HGB UR QL STRIP: NEGATIVE
HYALINE CASTS URNS QL MICRO: >20 /LPF (ref 0–5)
IMM GRANULOCYTES # BLD AUTO: 0 K/UL (ref 0–0.04)
IMM GRANULOCYTES NFR BLD AUTO: 0 % (ref 0–0.5)
KETONES UR QL STRIP.AUTO: ABNORMAL MG/DL
LEUKOCYTE ESTERASE UR QL STRIP.AUTO: NEGATIVE
LYMPHOCYTES # BLD: 1.11 K/UL (ref 0.8–3.5)
LYMPHOCYTES NFR BLD: 15 % (ref 12–49)
MAGNESIUM SERPL-MCNC: 1.9 MG/DL (ref 1.6–2.4)
MCH RBC QN AUTO: 27.1 PG (ref 26–34)
MCHC RBC AUTO-ENTMCNC: 30.3 G/DL (ref 30–36.5)
MCV RBC AUTO: 89.2 FL (ref 80–99)
MONOCYTES # BLD: 0.3 K/UL (ref 0–1)
MONOCYTES NFR BLD: 4 % (ref 5–13)
MUCOUS THREADS URNS QL MICRO: ABNORMAL /LPF
NEUTS BAND NFR BLD MANUAL: 1 %
NEUTS SEG # BLD: 4.66 K/UL (ref 1.8–8)
NEUTS SEG NFR BLD: 62 % (ref 32–75)
NITRITE UR QL STRIP.AUTO: NEGATIVE
NRBC # BLD: 0 K/UL (ref 0–0.01)
NRBC BLD-RTO: 0 PER 100 WBC
PCO2 BLDV: 85.7 MMHG (ref 41–51)
PH BLDV: 7.3 (ref 7.32–7.42)
PH UR STRIP: 5 (ref 5–8)
PLATELET # BLD AUTO: 238 K/UL (ref 150–400)
PMV BLD AUTO: 10.3 FL (ref 8.9–12.9)
PO2 BLDV: 37 MMHG (ref 25–40)
POTASSIUM SERPL-SCNC: 3.7 MMOL/L (ref 3.5–5.1)
PROT UR STRIP-MCNC: 100 MG/DL
RBC # BLD AUTO: 3.62 M/UL (ref 4.1–5.7)
RBC #/AREA URNS HPF: ABNORMAL /HPF (ref 0–5)
RBC MORPH BLD: ABNORMAL
SAO2 % BLDV: 62 % (ref 65–88)
SAO2% DEVICE SAO2% SENSOR NAME: ABNORMAL
SODIUM SERPL-SCNC: 136 MMOL/L (ref 136–145)
SODIUM UR-SCNC: 27 MMOL/L
SP GR UR REFRACTOMETRY: 1.02
SPECIMEN SITE: ABNORMAL
URINE CULTURE IF INDICATED: ABNORMAL
UROBILINOGEN UR QL STRIP.AUTO: 1 EU/DL (ref 0.2–1)
WBC # BLD AUTO: 7.4 K/UL (ref 4.1–11.1)
WBC URNS QL MICRO: ABNORMAL /HPF (ref 0–4)

## 2025-01-07 PROCEDURE — 93005 ELECTROCARDIOGRAM TRACING: CPT | Performed by: INTERNAL MEDICINE

## 2025-01-07 PROCEDURE — P9047 ALBUMIN (HUMAN), 25%, 50ML: HCPCS | Performed by: INTERNAL MEDICINE

## 2025-01-07 PROCEDURE — 6370000000 HC RX 637 (ALT 250 FOR IP): Performed by: INTERNAL MEDICINE

## 2025-01-07 PROCEDURE — 51701 INSERT BLADDER CATHETER: CPT

## 2025-01-07 PROCEDURE — 2060000000 HC ICU INTERMEDIATE R&B

## 2025-01-07 PROCEDURE — 85025 COMPLETE CBC W/AUTO DIFF WBC: CPT

## 2025-01-07 PROCEDURE — 2500000003 HC RX 250 WO HCPCS: Performed by: INTERNAL MEDICINE

## 2025-01-07 PROCEDURE — 6360000002 HC RX W HCPCS: Performed by: INTERNAL MEDICINE

## 2025-01-07 PROCEDURE — 82803 BLOOD GASES ANY COMBINATION: CPT

## 2025-01-07 PROCEDURE — 36415 COLL VENOUS BLD VENIPUNCTURE: CPT

## 2025-01-07 PROCEDURE — 94760 N-INVAS EAR/PLS OXIMETRY 1: CPT

## 2025-01-07 PROCEDURE — 2700000000 HC OXYGEN THERAPY PER DAY

## 2025-01-07 PROCEDURE — 81001 URINALYSIS AUTO W/SCOPE: CPT

## 2025-01-07 PROCEDURE — 80048 BASIC METABOLIC PNL TOTAL CA: CPT

## 2025-01-07 PROCEDURE — 83735 ASSAY OF MAGNESIUM: CPT

## 2025-01-07 PROCEDURE — 93970 EXTREMITY STUDY: CPT

## 2025-01-07 PROCEDURE — 2580000003 HC RX 258: Performed by: INTERNAL MEDICINE

## 2025-01-07 PROCEDURE — 84300 ASSAY OF URINE SODIUM: CPT

## 2025-01-07 PROCEDURE — 51798 US URINE CAPACITY MEASURE: CPT

## 2025-01-07 RX ORDER — SODIUM CHLORIDE 9 MG/ML
INJECTION, SOLUTION INTRAVENOUS CONTINUOUS
Status: DISCONTINUED | OUTPATIENT
Start: 2025-01-07 | End: 2025-01-07

## 2025-01-07 RX ORDER — 0.9 % SODIUM CHLORIDE 0.9 %
500 INTRAVENOUS SOLUTION INTRAVENOUS ONCE
Status: COMPLETED | OUTPATIENT
Start: 2025-01-07 | End: 2025-01-07

## 2025-01-07 RX ORDER — ALBUMIN (HUMAN) 12.5 G/50ML
25 SOLUTION INTRAVENOUS ONCE
Status: DISCONTINUED | OUTPATIENT
Start: 2025-01-07 | End: 2025-01-07

## 2025-01-07 RX ORDER — ALBUMIN (HUMAN) 12.5 G/50ML
25 SOLUTION INTRAVENOUS EVERY 8 HOURS
Status: DISCONTINUED | OUTPATIENT
Start: 2025-01-07 | End: 2025-01-10

## 2025-01-07 RX ADMIN — DOXAZOSIN 2 MG: 2 TABLET ORAL at 09:39

## 2025-01-07 RX ADMIN — APIXABAN 5 MG: 5 TABLET, FILM COATED ORAL at 22:14

## 2025-01-07 RX ADMIN — SODIUM CHLORIDE 500 ML: 9 INJECTION, SOLUTION INTRAVENOUS at 10:02

## 2025-01-07 RX ADMIN — METOPROLOL SUCCINATE 100 MG: 50 TABLET, EXTENDED RELEASE ORAL at 09:39

## 2025-01-07 RX ADMIN — APIXABAN 5 MG: 5 TABLET, FILM COATED ORAL at 09:39

## 2025-01-07 RX ADMIN — SODIUM CHLORIDE, PRESERVATIVE FREE 10 ML: 5 INJECTION INTRAVENOUS at 22:14

## 2025-01-07 RX ADMIN — SKIN PROTECTANT: 33 OINTMENT TOPICAL at 22:14

## 2025-01-07 RX ADMIN — BUMETANIDE 0.5 MG/HR: 0.25 INJECTION INTRAMUSCULAR; INTRAVENOUS at 17:23

## 2025-01-07 RX ADMIN — ALBUMIN (HUMAN) 25 G: 0.25 INJECTION, SOLUTION INTRAVENOUS at 20:18

## 2025-01-07 RX ADMIN — SODIUM CHLORIDE, PRESERVATIVE FREE 10 ML: 5 INJECTION INTRAVENOUS at 10:03

## 2025-01-07 RX ADMIN — SKIN PROTECTANT: 33 OINTMENT TOPICAL at 10:03

## 2025-01-07 RX ADMIN — ALBUMIN (HUMAN) 25 G: 0.25 INJECTION, SOLUTION INTRAVENOUS at 12:42

## 2025-01-07 NOTE — CONSULTS
[facility]        Consult    NAME: Piter Morales   :  1955   MRN:  [id number]     Date/Time:  2025 11:51 AM    Patient PCP: Jenni Ortiz MD  ________________________________________________________________________    Attending attestation:     I saw and evaluated Piter Morales on 25.  The  case was discussed with Amanda Fernandez NP.  I personally reviewed the HPI, PMH, FMH, Soc Hx, ROS, and medications.  I repeated pertinent portions of the examination and reviewed the relevant imaging and laboratory data.  I agree with the findings, assessment, and plan as documented.      HPI:   Pedal edema, fluid overload, JANA with diuresis     Physical Exam: Pedal edema, Lungs decreased, Irregular RR    Vitals and labs reviewed     Assessment    HFpEF, RV dysfunction   Permanent Atrial Fibrillation s/p Afib ablation x 2 attempts  RBBB  Hypertension  Pulmonary Hypertension  Morbid Obesity  Obstructive Sleep Apnea  JANA   Anemia  Prostate Cancer History    Plan:    Diuretic per renal   If needed may consider RHC   Monitor renal function  Cont metoprolol and apixaban    Dr Lewis to see patient tomorrow      Thank you for this consult and allowing me to take part in this patients care.  Please call with questions.      Signed By: Annia Santiago MD     2025            __________________________________________________________________   Assessment:     HFpEF  Permanent Atrial Fibrillation s/p Afib ablation x 2 attempts  RBBB  Hypertension  Pulmonary Hypertension  Morbid Obesity  Obstructive Sleep Apnea  JANA   Anemia  Prostate Cancer History        Plan:   Further plan per Dr Jc Santiago  This was mild and appears more like chronic leg edema. TTE  with LVEF 55-60%,  RV moderately dilated, pro BNP mildly elevated 3388. EKG with Afib with RVR and RBBB, Qtc 510, rate 117. Appears to be in renal failure. Diuretics on hold, which I agree. Weight up by 5 lbs from  LVOT:AV VTI Index 0.42     JABARI/BSA VTI 0.5 cm2/m2    JABARI/BSA Peak Velocity 0.5 cm2/m2    MV:LVOT VTI Index 1.42     EF Physician 58 %   Basic Metabolic Panel    Collection Time: 01/07/25  2:37 AM   Result Value Ref Range    Sodium 136 136 - 145 mmol/L    Potassium 3.7 3.5 - 5.1 mmol/L    Chloride 94 (L) 97 - 108 mmol/L    CO2 36 (H) 21 - 32 mmol/L    Anion Gap 6 2 - 12 mmol/L    Glucose 113 (H) 65 - 100 mg/dL    BUN 27 (H) 6 - 20 MG/DL    Creatinine 3.09 (H) 0.70 - 1.30 MG/DL    BUN/Creatinine Ratio 9 (L) 12 - 20      Est, Glom Filt Rate 21 (L) >60 ml/min/1.73m2    Calcium 7.8 (L) 8.5 - 10.1 MG/DL   CBC with Auto Differential    Collection Time: 01/07/25  2:37 AM   Result Value Ref Range    WBC 7.4 4.1 - 11.1 K/uL    RBC 3.62 (L) 4.10 - 5.70 M/uL    Hemoglobin 9.8 (L) 12.1 - 17.0 g/dL    Hematocrit 32.3 (L) 36.6 - 50.3 %    MCV 89.2 80.0 - 99.0 FL    MCH 27.1 26.0 - 34.0 PG    MCHC 30.3 30.0 - 36.5 g/dL    RDW 15.9 (H) 11.5 - 14.5 %    Platelets 238 150 - 400 K/uL    MPV 10.3 8.9 - 12.9 FL    Nucleated RBCs 0.0 0  WBC    nRBC 0.00 0.00 - 0.01 K/uL    Neutrophils % 62.0 32.0 - 75.0 %    Band Neutrophils 1 %    Lymphocytes % 15.0 12.0 - 49.0 %    Monocytes % 4.0 (L) 5.0 - 13.0 %    Eosinophils % 17.0 (H) 0.0 - 7.0 %    Basophils % 1.0 0.0 - 1.0 %    Immature Granulocytes % 0.0 0.0 - 0.5 %    Neutrophils Absolute 4.66 1.80 - 8.00 K/UL    Lymphocytes Absolute 1.11 0.80 - 3.50 K/UL    Monocytes Absolute 0.30 0.00 - 1.00 K/UL    Eosinophils Absolute 1.26 (H) 0.00 - 0.40 K/UL    Basophils Absolute 0.07 0.00 - 0.10 K/UL    Immature Granulocytes Absolute 0.00 0.00 - 0.04 K/UL    Differential Type MANUAL      RBC Comment NORMOCYTIC, NORMOCHROMIC     Magnesium    Collection Time: 01/07/25  2:37 AM   Result Value Ref Range    Magnesium 1.9 1.6 - 2.4 mg/dL

## 2025-01-07 NOTE — PROGRESS NOTES
Hospitalist Progress Note    NAME:   Piter Morales   : 1955   MRN: [id number]     Date/Time: 2025 12:43 AM  Patient PCP: Jenni Ortiz MD    Estimated discharge date:  Barriers:       Assessment / Plan:  New onset lower extremity/scrotal/abdominal edema possibly secondary to CHF exacerbation triggered by A-fib  -Significant diuresis, significant improvement  - I reviewed Dr. Mata note . He is diagnosed with A-fib but he was not diagnosed with CHF,  -It is unclear to me if the patient has a paroxysmal atrial fibrillation or persistent atrial fibrillation  -Last ablation was in   -Chest x-ray did not show evidence of pulmonary edema lungs were clear to auscultation, BNP is elevated at 3000  - Proceed with CT of abdomen and pelvis with contrast, echocardiogram  -Lasix 40 mg twice daily 25 mg daily, creatinine is trending up, hold metolazone on   - Cardiology consult  - Daily weight  -Strict RODRICK's     History of A-fib  - Currently r heart rate in the 100  Continue Eliquis,   cont beta-blocker     Hypoalbuminemia   possibly secondary to hepatic congestion     Hypertension  - Fairly controlled   -Hold lisinopril so room available for diuresis     Asthma  - No wheezing        History of prostate cancer  - Status post radiation, currently patient is not taking any medication  - prostate cancer treated with ADT     LUTS   on doxazosin                  Medical Decision Making:   I personally reviewed labs:  I personally reviewed imaging:  I personally reviewed EKG:  Toxic drug monitoring:   Discussed case with:         Code Status:   DVT Prophylaxis:   GI Prophylaxis:    Subjective:     Chief Complaint / Reason for Physician Visit  Discussed with RN events overnight.       Objective:     VITALS:   Last 24hrs VS reviewed since prior progress note. Most recent are:  Patient Vitals for the past 24 hrs:   BP Temp Temp src Pulse Resp SpO2 Height Weight   25 0026 (!) 94/56 -- -- -- -- -- --  were not copied into this note but were reviewed prior to creation of Plan.      LABS:  I reviewed today's most current labs and imaging studies.  Pertinent labs include:  Recent Labs     01/04/25  0804   WBC 6.3   HGB 11.0*   HCT 36.6        Recent Labs     01/04/25  0804 01/06/25  0609    137   K 4.3 3.5    95*   CO2 34* 37*   GLUCOSE 110* 109*   BUN 13 18   CREATININE 1.28 1.58*   CALCIUM 8.5 8.3*   BILITOT 0.8  --    AST 18  --    ALT 17  --        Signed: Sumit Fowler MD

## 2025-01-07 NOTE — PLAN OF CARE
Problem: Safety - Adult  Goal: Free from fall injury  1/7/2025 1102 by Aubree Youngblood, RN  Outcome: Progressing  1/7/2025 1102 by Aubree Youngblood, RN  Outcome: Progressing     Problem: Respiratory - Adult  Goal: Achieves optimal ventilation and oxygenation  Outcome: Progressing

## 2025-01-07 NOTE — DISCHARGE INSTRUCTIONS
Please stop taking your blood pressure medication (Lotrel) and start taking metoprolol 2 times a day, check your blood pressure on a daily basis if the top number (systolic blood pressure) is more than 160 continuously less than 120 continuously please call your doctor immediately.  You are on a blood thinner medication that can increase your risk of bleeding, please be cautious with any activity they can increase the risk of bleeding            You have been given a copy of the American Heart Association's Heart Failure Educational Booklet.  Please read over this booklet and take it with you to your next physician appointment with any questions you may have.     For additional resources and to access the American Heart Association's Interactive Workbook \"Healthier Living with Heart Failure - Managing Symptoms and Reducing Risk,\" please scan the QR code below.               Download the Heart Failure [place] Alli: Search in your Google Play Store ([employer]) or [employer] Alli Store ([employer]): Search for- HF [place] Alli.    https://www.heart.org/en/health-topics/heart-failure/heart-failure-tools-resources/nf-ptnsoi-fmo    HF [place] is a brand-new phone alli that helps you track daily symptoms, vitals, mood, energy level and more. You can even add your heart failure care team members to view your data and monitor your condition at home.    HF [place] Lets You:  Track symptoms, medications and more  Share health information with your health care team  Connect with others living with heart failure

## 2025-01-07 NOTE — PROGRESS NOTES
TRANSFER - OUT REPORT:    Verbal report given to NEHA Jones on Piter Morales  being transferred to [facility] for change in patient condition (need for bumex drip)       For any additional questions please call [phone]    Rapid response nurse instructed this nurse to wait until patient is on CPC to give the bumex drip.    Report consisted of patient's Situation, Background, Assessment and   Recommendations(SBAR).     Information from the following report(s) Index, Intake/Output, and MAR was reviewed with the receiving nurse.           Lines:   Peripheral IV 01/04/25 Distal;Left;Anterior Cephalic (Active)   Site Assessment Clean, dry & intact 01/07/25 0730   Line Status Capped;Flushed 01/07/25 0730   Line Care Cap changed;Connections checked and tightened 01/07/25 0730   Phlebitis Assessment No symptoms 01/07/25 0730   Infiltration Assessment 0 01/07/25 0730   Alcohol Cap Used Yes 01/07/25 0730   Dressing Status Clean, dry & intact 01/07/25 0730   Dressing Type Transparent 01/07/25 0730        Opportunity for questions and clarification was provided.      Patient transported with:  Monitor and O2 @ 4lpm

## 2025-01-07 NOTE — DISCHARGE INSTR - COC
Continuity of Care Form    Patient Name: Piter Morales   :  1955  MRN:  [id number]    Admit date:  2025  Discharge date:  ***    Code Status Order: Full Code   Advance Directives:   Advance Care Flowsheet Documentation             Admitting Physician:  Sumit Fowler MD  PCP: Jenni Ortiz MD    Discharging Nurse: ***  Discharging Hospital Unit/Room#: 3414/01  Discharging Unit Phone Number: ***    Emergency Contact:   Extended Emergency Contact Information  Primary Emergency Contact: Eli Morales   [facility]  Home Phone: [phone]  Mobile Phone: [phone]  Relation: Spouse    Past Surgical History:  Past Surgical History:   Procedure Laterality Date    COLONOSCOPY N/A 2023    COLONOSCOPY/BIOPSY/POLYP performed by Jesús Dave MD at [facility] ENDOSCOPY    ORTHOPEDIC SURGERY      L hip replacement    OTHER SURGICAL HISTORY      hematoma evacuation L hip    OTHER SURGICAL HISTORY      cardiac ablation x3       Immunization History:     There is no immunization history on file for this patient.    Active Problems:  Patient Active Problem List   Diagnosis Code    S/P ablation of atrial fibrillation Z98.890, Z86.79    Hypotension during surgery I97.88, I95.89    Atrial fibrillation (HCC) I48.91    Prostate cancer (HCC) C61    History of radiation therapy Z92.3    Morbid obesity with BMI of 45.0-49.9, adult E66.01, Z68.42    Increasing PSA level after treatment for prostate cancer R97.21    Anasarca R60.1    SOB (shortness of breath) R06.02       Isolation/Infection:   Isolation            No Isolation          Patient Infection Status       None to display            Nurse Assessment:  Last Vital Signs: /68   Pulse (!) 106   Temp 98.4 °F (36.9 °C) (Oral)   Resp 16   Ht 1.854 m (6' 1\")   Wt (!) 179.2 kg (395 lb 1 oz)   SpO2 95%   BMI 52.12 kg/m²     Last documented pain score (0-10 scale): Pain Level: 0  Last Weight:   Wt Readings from Last 1 Encounters:   25  (!) 179.2 kg (395 lb 1 oz)     Mental Status:  {IP PT MENTAL STATUS:}    IV Access:  { GEMA IV ACCESS:277299485}    Nursing Mobility/ADLs:  Walking   {CHP DME ADLs:448541847}  Transfer  {CHP DME ADLs:918545076}  Bathing  {CHP DME ADLs:895427506}  Dressing  {CHP DME ADLs:413655140}  Toileting  {CHP DME ADLs:599902906}  Feeding  {CHP DME ADLs:453877390}  Med Admin  {CHP DME ADLs:422130526}  Med Delivery   { GEMA MED Delivery:610756490}    Wound Care Documentation and Therapy:        Elimination:  Continence:   Bowel: {YES / NO:}  Bladder: {YES / NO:}  Urinary Catheter: {Urinary Catheter:016477230}   Colostomy/Ileostomy/Ileal Conduit: {YES / NO:}       Date of Last BM: ***    Intake/Output Summary (Last 24 hours) at 2025 1221  Last data filed at 2025 0400  Gross per 24 hour   Intake 1420 ml   Output 875 ml   Net 545 ml     I/O last 3 completed shifts:  In:  [P.O.:]  Out:  [Urine:]    Safety Concerns:     { EGMA Safety Concerns:989123128}    Impairments/Disabilities:      { GEMA Impairments/Disabilities:224791098}    Nutrition Therapy:  Current Nutrition Therapy:   { GEMA Diet List:249150216}    Routes of Feeding: {[facility] DME Other Feedings:169703790}  Liquids: {Slp liquid thickness:60025}  Daily Fluid Restriction: {CHP DME Yes amt example:515627531}  Last Modified Barium Swallow with Video (Video Swallowing Test): {Done Not Done Date:}    Treatments at the Time of Hospital Discharge:   Respiratory Treatments: ***  Oxygen Therapy:  {Therapy; copd oxygen:35342}  Ventilator:    { CC Vent List:767695397}    Rehab Therapies: {THERAPEUTIC INTERVENTION:3091207392}  Weight Bearing Status/Restrictions: { CC Weight Bearin}  Other Medical Equipment (for information only, NOT a DME order):  {EQUIPMENT:354484874}  Other Treatments: ***    Patient's personal belongings (please select all that are sent with patient):  {P DME Belongings:488684604}    RN SIGNATURE:

## 2025-01-07 NOTE — PROGRESS NOTES
Nursing contacted Nocturnist/cross cover provider via non-urgent messaging system ROKT and notified patient not void since 1830, on lasix, bladder feels distended, unable to obtain a reading on bladder scan reported. bp 94/56, states having some \"tremors\", states rapid nurse seen pt, no seizures activity reported. nad reported. no acute focal deficits reported. asking for straight cath and labs for lytes eval. . No other concerns reported. No acute distress reported. No other information provided by nurse. VSS.     Ordered cbc to am labs, has bmp already ordered, nurse to get labs now. straight cath x1 now. . Will defer further evaluation/management to the day shift primary attending care team. Patient denies any further complaints or concerns.     Nursing to notify Hospitalist for further/continued concerns. Will remain available overnight for further concerns if nursing/patient needs. Please note, there are RRT systems in this hospital in place that if nursing has acute or critical patient condition change or concern, this is to help facilitate and notify that patient needs immediate bedside evaluation by a provider.     Update   0329 50ml chris urine straight cath reported by nursing. Asked nurse do po fluid bolus, if unable asked nurse to msg me back, nurse to monitor bladder and output.      Non-billable note.

## 2025-01-07 NOTE — CONSULTS
Nephrology Consult Note     [facility]                Phone - [phone]   Patient: Piter Morales   YOB: 1955    Date- 1/7/2025  MRN: [id number]             CONSULTING PHYSICIAN: Dr Fowler    REASON FOR CONSULTATION: JANA stage 3,Hypervolemia  ADMIT DATE:1/4/2025 PATIENT PCP:Jenni Ortiz MD     IMPRESSION & PLAN:   JANA stage 3( sec to JOSUE,Labile hemodynamics, BP as low as 71/50 mmhg)( CT neg for hydro)  Severe volume overload  Anemia  Afib RVR  HTN  MELBA      PLAN-  -Start IV bumex gtt 0.5 mg / hour.  -Check daily standing weight.  -Start IV albumin to stabilize BP.  -Check daily labs  -Appreciate cardiology input  -Requested IM team to transfer patient to stepdown  -Place Cook catheter for accurate input and output.  -Thank you for the consult.       Principal Problem:    Anasarca  Active Problems:    SOB (shortness of breath)  Resolved Problems:    * No resolved hospital problems. *      [x] High complexity decision making was performed  [] Patient is at high-risk of decompensation with multiple organ involvement    Subjective:   HPI: Piter Morales is a 69 y.o. male who was admitted on 01/04 for complaints of increased weight gain, worsening lower extremity edema scrotal edema and abdominal edema.  Patient has history of hypertension sleep apnea, A-fib RVR status post ablation without any success history of anemia.  He does not take any diuretics at home.  He follows up with Dr. Mora who is his cardiologist.  Initial workup showed elevated BNP of 3000, chest x-ray shows no evidence of pulmonary congestion.  Patient had significant scrotal edema and was admitted for diuresis.  Patient underwent a CT scan abdomen pelvis with IV contrast.  He was given Lasix and metolazone for diuretics.  After receiving CT patient's creatinine got worse to 1.5 and now 3.09.  He is exhibiting ATN.  He remains oliguric despite of using diuretics.  Renal consult is  12:15 PM    PHUR 5.0 01/07/2025 12:15 PM    PROTEINU 100 (A) 01/07/2025 12:15 PM    NITRU Negative 01/07/2025 12:15 PM    LEUKOCYTESUR Negative 01/07/2025 12:15 PM     No components found for: \"CULT\"  No results found for: \"MCA2\", \"MCAU2\"  No results found for: \"BNP\", \"BNPPOC\", \"BNPNT\"  US Results (most recent):  @BSHSILASTIMGCAT(JBC5393:1)@   Echo (TTE) limited (PRN contrast/bubble/strain/3D)    Left Ventricle: Normal left ventricular systolic function with a   visually estimated EF of 55 - 60%. Left ventricle size is normal. Normal   wall thickness. Normal wall motion.    Right Ventricle: Moderately reduced systolic function.     Prior to Admission Medications   Prescriptions Last Dose Informant Patient Reported? Taking?   albuterol sulfate HFA (PROVENTIL;VENTOLIN;PROAIR) 108 (90 Base) MCG/ACT inhaler Past Week Self Yes Yes   Sig: Inhale 2 puffs into the lungs every 6 hours as needed   apixaban (ELIQUIS) 5 MG TABS tablet 1/4/2025 Self Yes Yes   Sig: Take 1 tablet by mouth 2 times daily   fluticasone furoate-vilanterol (BREO ELLIPTA) 100-25 MCG/ACT inhaler 1/4/2025 Self Yes Yes   Sig: Inhale 1 puff into the lungs daily   lisinopril (PRINIVIL;ZESTRIL) 40 MG tablet 1/4/2025 Self Yes Yes   Sig: Take 1 tablet by mouth daily   metoprolol succinate (TOPROL XL) 50 MG extended release tablet 1/4/2025 Self Yes Yes   Sig: Take 2 tablets by mouth daily      Facility-Administered Medications: None         Imaging:    Medications list Personally Reviewed   [x]      Yes     []               No    Thank you for allowing us to participate in the care this patient.   We will follow patient with you.  Signed By: Neal Millan MD  [facility]  [facility]  [address], [address]  [place] [address]  Phone - [phone]         Fax - [phone] [place] [facility]  [address] [place] [address]  Phone - [phone]        Fax - [phone]

## 2025-01-07 NOTE — PROGRESS NOTES
End of Shift Note    Bedside shift change report given to NEHA Stroud, (oncoming nurse) by Violet Cobian RN (offgoing nurse).  Report included the following information SBAR, Kardex, Intake/Output, and Recent Results    Shift worked:  1900-700     Shift summary and any significant changes:    Pt received scheduled medications per MAR. Pt denied pain, dyspnea, and N/V. Pt nasal cannula replaced and pt reminded to keep nasal cannula on face and to call if readjustment is needed. Pt continued to have tremors through the night. Pt Bps fluctuated high and low earlier in the shift. Rapid nurse Rajni came to bedside, BP taken while she there was 94/56. Pt also stated that he last time he urinated was on dayshift around 630pm. NP [place] notified, order placed to straight cath pt and CBC and BMP ordered. 50mL removed with straight cath. NP [place] aware and instructed RN to do PO fluid bolus of 250-500mL over two hours and to continue monitor bladder and output. Pt was able to get up to the bathroom and urinated 75mL Pt labs collected. Pt safety and caring rounds complete.      Concerns for physician to address:  N/a      Zone phone for oncoming shift:   [phone]       Activity:  Level of Assistance: Standby assist, set-up cues, supervision of patient - no hands on  Number times ambulated in hallways past shift: 0  Number of times OOB to chair past shift: 0    Cardiac:   Cardiac Monitoring: Yes      Cardiac Rhythm: Atrial fib    Access:  Current line(s): PIV     Genitourinary:   Urinary Status: Has not voided    Respiratory:   O2 Device: Nasal cannula  Chronic home O2 use?: NO  Incentive spirometer at bedside: NO    GI:  Last BM (including prior to admit): 01/05/25  Current diet:  ADULT DIET; Regular; Low Fat/Low Chol/High Fiber/JEFE; Low Sodium (2 gm)  Passing flatus: YES    Pain Management:   Patient states pain is manageable on current regimen: YES    Skin:  Cong Scale Score: 20  Interventions: Wound Offloading (Prevention

## 2025-01-08 LAB
ALBUMIN SERPL-MCNC: 3.6 G/DL (ref 3.5–5)
ANION GAP SERPL CALC-SCNC: 6 MMOL/L (ref 2–12)
BUN SERPL-MCNC: 31 MG/DL (ref 6–20)
BUN/CREAT SERPL: 16 (ref 12–20)
CALCIUM SERPL-MCNC: 8.8 MG/DL (ref 8.5–10.1)
CHLORIDE SERPL-SCNC: 92 MMOL/L (ref 97–108)
CO2 SERPL-SCNC: 38 MMOL/L (ref 21–32)
CREAT SERPL-MCNC: 1.92 MG/DL (ref 0.7–1.3)
GLUCOSE SERPL-MCNC: 104 MG/DL (ref 65–100)
PHOSPHATE SERPL-MCNC: 4.4 MG/DL (ref 2.6–4.7)
POTASSIUM SERPL-SCNC: 4.1 MMOL/L (ref 3.5–5.1)
SODIUM SERPL-SCNC: 136 MMOL/L (ref 136–145)

## 2025-01-08 PROCEDURE — 2060000000 HC ICU INTERMEDIATE R&B

## 2025-01-08 PROCEDURE — 2700000000 HC OXYGEN THERAPY PER DAY

## 2025-01-08 PROCEDURE — P9047 ALBUMIN (HUMAN), 25%, 50ML: HCPCS | Performed by: INTERNAL MEDICINE

## 2025-01-08 PROCEDURE — 2500000003 HC RX 250 WO HCPCS: Performed by: INTERNAL MEDICINE

## 2025-01-08 PROCEDURE — 6370000000 HC RX 637 (ALT 250 FOR IP): Performed by: INTERNAL MEDICINE

## 2025-01-08 PROCEDURE — 6360000002 HC RX W HCPCS: Performed by: INTERNAL MEDICINE

## 2025-01-08 PROCEDURE — 80069 RENAL FUNCTION PANEL: CPT

## 2025-01-08 PROCEDURE — 36415 COLL VENOUS BLD VENIPUNCTURE: CPT

## 2025-01-08 RX ORDER — MIDODRINE HYDROCHLORIDE 5 MG/1
10 TABLET ORAL 3 TIMES DAILY
Status: DISCONTINUED | OUTPATIENT
Start: 2025-01-08 | End: 2025-01-14

## 2025-01-08 RX ADMIN — APIXABAN 5 MG: 5 TABLET, FILM COATED ORAL at 08:39

## 2025-01-08 RX ADMIN — METOPROLOL SUCCINATE 100 MG: 50 TABLET, EXTENDED RELEASE ORAL at 08:39

## 2025-01-08 RX ADMIN — SODIUM CHLORIDE, PRESERVATIVE FREE 10 ML: 5 INJECTION INTRAVENOUS at 08:42

## 2025-01-08 RX ADMIN — ALBUMIN (HUMAN) 25 G: 0.25 INJECTION, SOLUTION INTRAVENOUS at 20:39

## 2025-01-08 RX ADMIN — SODIUM CHLORIDE, PRESERVATIVE FREE 10 ML: 5 INJECTION INTRAVENOUS at 20:45

## 2025-01-08 RX ADMIN — BUMETANIDE 0.5 MG/HR: 0.25 INJECTION INTRAMUSCULAR; INTRAVENOUS at 11:50

## 2025-01-08 RX ADMIN — DOXAZOSIN 2 MG: 2 TABLET ORAL at 08:40

## 2025-01-08 RX ADMIN — APIXABAN 5 MG: 5 TABLET, FILM COATED ORAL at 20:44

## 2025-01-08 RX ADMIN — MIDODRINE HYDROCHLORIDE 10 MG: 5 TABLET ORAL at 10:43

## 2025-01-08 RX ADMIN — ALBUMIN (HUMAN) 25 G: 0.25 INJECTION, SOLUTION INTRAVENOUS at 11:51

## 2025-01-08 RX ADMIN — ALBUMIN (HUMAN) 25 G: 0.25 INJECTION, SOLUTION INTRAVENOUS at 04:23

## 2025-01-08 RX ADMIN — SKIN PROTECTANT: 33 OINTMENT TOPICAL at 20:47

## 2025-01-08 RX ADMIN — MIDODRINE HYDROCHLORIDE 10 MG: 5 TABLET ORAL at 14:17

## 2025-01-08 RX ADMIN — MIDODRINE HYDROCHLORIDE 10 MG: 5 TABLET ORAL at 20:45

## 2025-01-08 RX ADMIN — SKIN PROTECTANT: 33 OINTMENT TOPICAL at 08:40

## 2025-01-08 ASSESSMENT — PAIN SCALES - GENERAL
PAINLEVEL_OUTOF10: 0

## 2025-01-08 NOTE — PLAN OF CARE
Problem: Safety - Adult  Goal: Free from fall injury  1/8/2025 1350 by Troy Liu RN  Outcome: Progressing  1/8/2025 1349 by Troy Liu RN  Outcome: Progressing     Problem: Cardiovascular - Adult  Goal: Maintains optimal cardiac output and hemodynamic stability  Outcome: Progressing     Problem: Skin/Tissue Integrity - Adult  Goal: Skin integrity remains intact  Outcome: Progressing     Problem: Respiratory - Adult  Goal: Achieves optimal ventilation and oxygenation  1/8/2025 1350 by Troy Liu RN  Outcome: Progressing  1/8/2025 1349 by Troy Liu RN  Outcome: Progressing  Flowsheets (Taken 1/8/2025 0812)  Achieves optimal ventilation and oxygenation: Assess for changes in respiratory status

## 2025-01-08 NOTE — PROGRESS NOTES
End of Shift Note    Bedside shift change report given to Nhi SOLOMON (oncoming nurse) by MATIAS SMALLWOOD, NEHA (offgoing nurse).  Report included the following information SBAR    Shift worked:  7am-7pm     Shift summary and any significant changes:     No signifecant change noted cont 4 LPM o2 decreased IV Bumex to 0.5 midodrine given x2 . Good urin ought put thought the day      Concerns for physician to address:  Nonw      Zone phone for oncoming shift:         Activity:  Level of Assistance: Minimal assist, patient does 75% or more  Number times ambulated in hallways past shift: 0  Number of times OOB to chair past shift: 0    Cardiac:   Cardiac Monitoring: Yes      Cardiac Rhythm: Atrial fib    Access:  Current line(s): PIV     Genitourinary:   Urinary Status: Cook    Respiratory:   O2 Device: Nasal cannula  Chronic home O2 use?: YES  Incentive spirometer at bedside: NO    GI:  Last BM (including prior to admit): 01/05/25  Current diet:  ADULT DIET; Full Liquid; 1200 ml  Passing flatus: YES    Pain Management:   Patient states pain is manageable on current regimen: YES    Skin:  Cong Scale Score: 17  Interventions: Wound Offloading (Prevention Methods): Pillows, Repositioning    Patient Safety:  Fall Risk: Nursing Judgement-Fall Risk High(Add Comments): Yes  Fall Risk Interventions  Nursing Judgement-Fall Risk High(Add Comments): Yes  Toilet Every 2 Hours-In Advance of Need: No (Comment)  Hourly Visual Checks: Awake, Quiet, In bed, Eyes closed  Fall Visual Posted: Armband, Fall sign posted  Room Door Open: Deferred to decrease stimulation  Alarm On: Bed  Patient Moved Closer to Nursing Station: No    Active Consults:   IP CONSULT TO PHARMACY  IP CONSULT TO CARDIOLOGY  IP CONSULT TO NEPHROLOGY    Length of Stay:  Expected LOS: 5  Actual LOS: 4    MATIAS SMALLWOOD, RN

## 2025-01-08 NOTE — PLAN OF CARE
Problem: Safety - Adult  Goal: Free from fall injury  Outcome: Progressing     Problem: Respiratory - Adult  Goal: Achieves optimal ventilation and oxygenation  Outcome: Progressing  Flowsheets (Taken 1/8/2025 0812)  Achieves optimal ventilation and oxygenation: Assess for changes in respiratory status

## 2025-01-08 NOTE — PROGRESS NOTES
Hospitalist Progress Note    NAME:   Piter Morales   : 1955   MRN: [id number]     Date/Time: 2025 12:35 AM  Patient PCP: Jenni Ortiz MD    Estimated discharge date:  Barriers:       Assessment / Plan:    Acute kidney injury  - Oliguric kidney injury, creatinine jumped to 3.09  - Possible contrast-induced nephropathy, drug reaction, ATN due to hypotension  - CT of abdomen and pelvis was done to evaluate for source of anasarca, given patient has no evidence of right or left heart failure on exam  - Consulted with nephrologist, started on Bumex drip, transferred to intermediate care unit, started on IV albumin    New onset lower extremity/scrotal/abdominal edema possibly secondary to CHF exacerbation triggered by A-fib  -Significant improvement of lower extremity edema , scrotal edema and abdominal wall edema  - Echocardiogram during this admission showed preserved ejection fraction no significant valvular abnormality, as per cardiology note patient diagnosed with diastolic heart failure RV dysfunction and permanent A-fib status post ablation x 2, also patient has a history of pulmonary hypertension/sleep apnea  -Last ablation was in   -Chest x-ray did not show evidence of pulmonary edema lungs were clear to auscultation, BNP is elevated at 3000  - Cardiology consult  - Daily weight  -Strict RODRICK's     History of A-fib  - Currently r heart rate in the 100  Continue Eliquis,   cont beta-blocker     Hypoalbuminemia   possibly secondary to hepatic congestion     Hypertension  - Fairly controlled   -Hold lisinopril so room available for diuresis     Asthma  - No wheezing        History of prostate cancer  - Status post radiation, currently patient is not taking any medication  - prostate cancer treated with ADT     LUTS   on doxazosin                  Medical Decision Making:   I personally reviewed labs:  I personally reviewed imaging:  I personally reviewed EKG:  Toxic drug monitoring:

## 2025-01-08 NOTE — PROGRESS NOTES
Progress Note      1/8/2025 8:42 AM  NAME: Piter Morales   MRN:  [id number]   Admit Diagnosis: Anasarca [R60.1]  SOB (shortness of breath) [R06.02]    Primary Cardiologist:  Dr Lewis   Physician Requesting consult: Dr Fowler        Assessment     HFpEF, RV dysfunction   JANA  Permanent Atrial Fibrillation s/p Afib ablation x 2 attempts  RBBB  Hypertension  Pulmonary Hypertension  Morbid Obesity  Obstructive Sleep Apnea  JANA   Anemia  Prostate Cancer History     Plan:     Diuretic per renal   Monitor renal function  Cont metoprolol and apixaban    Labs pending, did have good UOP, consider RHC If needed      Subjective:     HPI:       ROS: No CP, SOB, Abd pain, nausea, vomiting, syncope, palpitations, new focal neurological symptoms     Objective:      Physical Exam:    Last 24hrs VS reviewed since prior progress note. Most recent are:    /86   Pulse (!) 115   Temp 98.3 °F (36.8 °C) (Oral)   Resp 20   Ht 1.854 m (6' 1\")   Wt (!) 179.2 kg (395 lb 1 oz)   SpO2 93%   BMI 52.12 kg/m²     Intake/Output Summary (Last 24 hours) at 1/8/2025 0842  Last data filed at 1/8/2025 0500  Gross per 24 hour   Intake 600 ml   Output 1400 ml   Net -800 ml           General: Alert and oriented x3, no acute distress, obese   Neck: Supple   Respiratory: No respiratory distress, decreased  Cardiovascular: Irregular rate rhythm, S1S2, no murmur   Abdomen: soft, non tender, non distended   Neuro: moves all extremities, oriented x3   Skin: warm and dry   Extremity: +  edema, warm to touch        Data Review    Telemetry: aFIB       Lab Data Personally Reviewed:    Recent Labs     01/07/25  0237   WBC 7.4   HGB 9.8*   HCT 32.3*        No results for input(s): \"INR\", \"APTT\" in the last 72 hours.    Invalid input(s): \"PTP\"   Recent Labs     01/06/25  0609 01/07/25  0237    136   K 3.5 3.7   CL 95* 94*   CO2 37* 36*   BUN 18 27*   MG  --  1.9     No results for input(s): \"CPK\" in the last 72 hours.    Invalid

## 2025-01-08 NOTE — PROGRESS NOTES
Nephrology Progress Note  [facility] [place] [facility]  [address], [address]  [place] [address]  Phone - [phone]  Fax - [phone]                 Patient: Piter Morales                     YOB: 1955        Date- 1/8/2025                                     Admit Date: 1/4/2025   CC: Follow up for JANA stage III          IMPRESSION & PLAN:   JANA stage 3( sec to JOSUE,Labile hemodynamics, BP as low as 71/50 mmhg)( CT neg for hydro)  Severe volume overload  Anemia  Afib RVR  HTN  MELBA      PLAN-  Excellent urine output(1.8 L since last 24 hours)  Continue with Bumex gtt. at 0.5 mg an hour  Add albumin and midodrine for blood pressure support  Check BMP daily  Spoke to RN     Subjective:  Interval History:   -Seen and examined today  -Noticed labile blood pressure  -Creatinine much improved from 3.0-1.9    Objective:   Vitals:    01/07/25 2233 01/08/25 0000 01/08/25 0302 01/08/25 0800   BP: 111/69  119/71 112/86   Pulse: (!) 113  (!) 137 (!) 115   Resp:  20 22 20   Temp:  98.2 °F (36.8 °C) 99.7 °F (37.6 °C) 98.3 °F (36.8 °C)   TempSrc:  Oral Oral Oral   SpO2:  94% 92% 93%   Weight:       Height:          I/O last 3 completed shifts:  In: 1100 [P.O.:1100]  Out: 1575 [Urine:1575]  I/O this shift:  In: -   Out: 1025 [Urine:1025]      Physical exam:    GEN: NAD  NECK- no mass  RESP: No wheezing, decreased BS b/l  CVS: S1,S2  RRR  NEURO: Normal speech, Non focal  EXT: 1+ edema   : Cook catheter present    Chart reviewed.         Pertinent Notes reviewed.     Data Review :  Lab Results   Component Value Date/Time     01/08/2025 03:13 AM    K 4.1 01/08/2025 03:13 AM    CL 92 01/08/2025 03:13 AM    CO2 38 01/08/2025 03:13 AM    BUN 31 01/08/2025 03:13 AM    CREATININE 1.92 01/08/2025 03:13 AM    GLUCOSE 104 01/08/2025 03:13 AM    CALCIUM 8.8 01/08/2025 03:13 AM       Lab Results   Component Value Date    WBC 7.4 01/07/2025

## 2025-01-09 ENCOUNTER — APPOINTMENT (OUTPATIENT)
Facility: HOSPITAL | Age: 70
End: 2025-01-09
Payer: MEDICARE

## 2025-01-09 LAB
ALBUMIN SERPL-MCNC: 3.9 G/DL (ref 3.5–5)
ANION GAP SERPL CALC-SCNC: 6 MMOL/L (ref 2–12)
ANION GAP SERPL CALC-SCNC: 7 MMOL/L (ref 2–12)
ARTERIAL PATENCY WRIST A: POSITIVE
ARTERIAL PATENCY WRIST A: POSITIVE
ARTERIAL PATENCY WRIST A: YES
BASE EXCESS BLD CALC-SCNC: 22.5 MMOL/L
BASE EXCESS BLD CALC-SCNC: 25.7 MMOL/L
BASE EXCESS BLDA CALC-SCNC: 20.7 MMOL/L
BDY SITE: ABNORMAL
BUN SERPL-MCNC: 32 MG/DL (ref 6–20)
BUN SERPL-MCNC: 33 MG/DL (ref 6–20)
BUN/CREAT SERPL: 23 (ref 12–20)
BUN/CREAT SERPL: 25 (ref 12–20)
CALCIUM SERPL-MCNC: 9 MG/DL (ref 8.5–10.1)
CALCIUM SERPL-MCNC: 9.1 MG/DL (ref 8.5–10.1)
CHLORIDE SERPL-SCNC: 87 MMOL/L (ref 97–108)
CHLORIDE SERPL-SCNC: 87 MMOL/L (ref 97–108)
CO2 SERPL-SCNC: 42 MMOL/L (ref 21–32)
CO2 SERPL-SCNC: 42 MMOL/L (ref 21–32)
CREAT SERPL-MCNC: 1.34 MG/DL (ref 0.7–1.3)
CREAT SERPL-MCNC: 1.37 MG/DL (ref 0.7–1.3)
EKG ATRIAL RATE: 129 BPM
EKG DIAGNOSIS: NORMAL
EKG Q-T INTERVAL: 382 MS
EKG QRS DURATION: 148 MS
EKG QTC CALCULATION (BAZETT): 593 MS
EKG R AXIS: 84 DEGREES
EKG T AXIS: 204 DEGREES
EKG VENTRICULAR RATE: 145 BPM
GAS FLOW.O2 O2 DELIVERY SYS: ABNORMAL
GAS FLOW.O2 O2 DELIVERY SYS: ABNORMAL
GAS FLOW.O2 SETTING OXYMISER: 20 BPM
GLUCOSE BLD STRIP.AUTO-MCNC: 141 MG/DL (ref 65–117)
GLUCOSE SERPL-MCNC: 101 MG/DL (ref 65–100)
GLUCOSE SERPL-MCNC: 104 MG/DL (ref 65–100)
HCO3 BLD-SCNC: 52.5 MMOL/L (ref 21–28)
HCO3 BLD-SCNC: 57.6 MMOL/L (ref 21–28)
HCO3 BLDA-SCNC: 51 MMOL/L (ref 22–26)
INSPIRATION.DURATION SETTING TIME VENT: 0.9 SEC
IPAP/PIP/HIGH PEEP: 25
O2/TOTAL GAS SETTING VFR VENT: 3.5 %
O2/TOTAL GAS SETTING VFR VENT: 35 %
PCO2 BLD: 113.5 MMHG (ref 35–48)
PCO2 BLD: 89.2 MMHG (ref 35–48)
PCO2 BLDA: 87 MMHG (ref 35–45)
PEEP RESPIRATORY: 8 CMH2O
PH BLD: 7.31 (ref 7.35–7.45)
PH BLD: 7.38 (ref 7.35–7.45)
PH BLDA: 7.39 (ref 7.35–7.45)
PHOSPHATE SERPL-MCNC: 3 MG/DL (ref 2.6–4.7)
PO2 BLD: 65 MMHG (ref 83–108)
PO2 BLD: 81 MMHG (ref 83–108)
PO2 BLDA: 128 MMHG (ref 80–100)
POTASSIUM SERPL-SCNC: 3.5 MMOL/L (ref 3.5–5.1)
POTASSIUM SERPL-SCNC: 3.5 MMOL/L (ref 3.5–5.1)
SAO2 % BLD: 90.1 % (ref 92–97)
SAO2 % BLD: 92.9 % (ref 92–97)
SAO2 % BLD: 98 % (ref 92–97)
SAO2% DEVICE SAO2% SENSOR NAME: ABNORMAL
SERVICE CMNT-IMP: ABNORMAL
SODIUM SERPL-SCNC: 135 MMOL/L (ref 136–145)
SODIUM SERPL-SCNC: 136 MMOL/L (ref 136–145)
SPECIMEN SITE: ABNORMAL
SPECIMEN TYPE: ABNORMAL
SPECIMEN TYPE: ABNORMAL
VENTILATION MODE VENT: ABNORMAL
VT SETTING VENT: 600 ML

## 2025-01-09 PROCEDURE — 97165 OT EVAL LOW COMPLEX 30 MIN: CPT

## 2025-01-09 PROCEDURE — 94660 CPAP INITIATION&MGMT: CPT

## 2025-01-09 PROCEDURE — 97530 THERAPEUTIC ACTIVITIES: CPT

## 2025-01-09 PROCEDURE — 2060000000 HC ICU INTERMEDIATE R&B

## 2025-01-09 PROCEDURE — 97162 PT EVAL MOD COMPLEX 30 MIN: CPT

## 2025-01-09 PROCEDURE — 36415 COLL VENOUS BLD VENIPUNCTURE: CPT

## 2025-01-09 PROCEDURE — 71045 X-RAY EXAM CHEST 1 VIEW: CPT

## 2025-01-09 PROCEDURE — 80048 BASIC METABOLIC PNL TOTAL CA: CPT

## 2025-01-09 PROCEDURE — P9047 ALBUMIN (HUMAN), 25%, 50ML: HCPCS | Performed by: INTERNAL MEDICINE

## 2025-01-09 PROCEDURE — 6370000000 HC RX 637 (ALT 250 FOR IP): Performed by: INTERNAL MEDICINE

## 2025-01-09 PROCEDURE — 6360000002 HC RX W HCPCS: Performed by: INTERNAL MEDICINE

## 2025-01-09 PROCEDURE — 2500000003 HC RX 250 WO HCPCS: Performed by: INTERNAL MEDICINE

## 2025-01-09 PROCEDURE — 82803 BLOOD GASES ANY COMBINATION: CPT

## 2025-01-09 PROCEDURE — 82962 GLUCOSE BLOOD TEST: CPT

## 2025-01-09 PROCEDURE — 2700000000 HC OXYGEN THERAPY PER DAY

## 2025-01-09 PROCEDURE — 5A09357 ASSISTANCE WITH RESPIRATORY VENTILATION, LESS THAN 24 CONSECUTIVE HOURS, CONTINUOUS POSITIVE AIRWAY PRESSURE: ICD-10-PCS | Performed by: INTERNAL MEDICINE

## 2025-01-09 PROCEDURE — 36600 WITHDRAWAL OF ARTERIAL BLOOD: CPT

## 2025-01-09 PROCEDURE — 80069 RENAL FUNCTION PANEL: CPT

## 2025-01-09 RX ORDER — BUMETANIDE 0.25 MG/ML
2 INJECTION, SOLUTION INTRAMUSCULAR; INTRAVENOUS 2 TIMES DAILY
Status: DISCONTINUED | OUTPATIENT
Start: 2025-01-09 | End: 2025-01-10

## 2025-01-09 RX ORDER — ACETAZOLAMIDE 250 MG/1
500 TABLET ORAL DAILY
Status: COMPLETED | OUTPATIENT
Start: 2025-01-09 | End: 2025-01-10

## 2025-01-09 RX ADMIN — BUMETANIDE 2 MG: 0.25 INJECTION INTRAMUSCULAR; INTRAVENOUS at 16:39

## 2025-01-09 RX ADMIN — SODIUM CHLORIDE, PRESERVATIVE FREE 10 ML: 5 INJECTION INTRAVENOUS at 08:48

## 2025-01-09 RX ADMIN — SKIN PROTECTANT: 33 OINTMENT TOPICAL at 08:46

## 2025-01-09 RX ADMIN — ALBUMIN (HUMAN) 25 G: 0.25 INJECTION, SOLUTION INTRAVENOUS at 12:44

## 2025-01-09 RX ADMIN — SODIUM CHLORIDE, PRESERVATIVE FREE 10 ML: 5 INJECTION INTRAVENOUS at 22:48

## 2025-01-09 RX ADMIN — DOXAZOSIN 2 MG: 2 TABLET ORAL at 08:45

## 2025-01-09 RX ADMIN — SKIN PROTECTANT: 33 OINTMENT TOPICAL at 22:01

## 2025-01-09 RX ADMIN — APIXABAN 5 MG: 5 TABLET, FILM COATED ORAL at 21:49

## 2025-01-09 RX ADMIN — SODIUM CHLORIDE, PRESERVATIVE FREE 10 ML: 5 INJECTION INTRAVENOUS at 22:00

## 2025-01-09 RX ADMIN — ALBUMIN (HUMAN) 25 G: 0.25 INJECTION, SOLUTION INTRAVENOUS at 03:34

## 2025-01-09 RX ADMIN — ACETAZOLAMIDE 500 MG: 250 TABLET ORAL at 08:48

## 2025-01-09 RX ADMIN — METOPROLOL SUCCINATE 100 MG: 50 TABLET, EXTENDED RELEASE ORAL at 08:45

## 2025-01-09 RX ADMIN — MIDODRINE HYDROCHLORIDE 10 MG: 5 TABLET ORAL at 16:41

## 2025-01-09 RX ADMIN — MIDODRINE HYDROCHLORIDE 10 MG: 5 TABLET ORAL at 08:45

## 2025-01-09 RX ADMIN — ALBUMIN (HUMAN) 25 G: 0.25 INJECTION, SOLUTION INTRAVENOUS at 21:50

## 2025-01-09 RX ADMIN — APIXABAN 5 MG: 5 TABLET, FILM COATED ORAL at 08:45

## 2025-01-09 ASSESSMENT — PAIN SCALES - GENERAL
PAINLEVEL_OUTOF10: 0
PAINLEVEL_OUTOF10: 0

## 2025-01-09 NOTE — PLAN OF CARE
Problem: Physical Therapy - Adult  Goal: By Discharge: Performs mobility at highest level of function for planned discharge setting.  See evaluation for individualized goals.  Description: FUNCTIONAL STATUS PRIOR TO ADMISSION: Pt questionable historian though reports he was independent and active without use of DME.    HOME SUPPORT PRIOR TO ADMISSION: The patient lived with family but states he did not require assistance.    Physical Therapy Goals  Initiated 1/9/2025  1.  Patient will move from supine to sit and sit to supine in bed with independence within 7 day(s).    2.  Patient will perform sit to stand with modified independence within 7 day(s).  3.  Patient will transfer from bed to chair and chair to bed with modified independence using the least restrictive device within 7 day(s).  4.  Patient will ambulate with modified independence for 100 feet with the least restrictive device within 7 day(s).   5.  Patient will ascend/descend 5 stairs with handrail(s) with modified independence within 7 day(s).   Outcome: Progressing     PHYSICAL THERAPY EVALUATION    Patient: Piter Morales (69 y.o. male)  Date: 1/9/2025  Primary Diagnosis: Anasarca [R60.1]  SOB (shortness of breath) [R06.02]       Precautions: Restrictions/Precautions: General Precautions, Fall Risk                      ASSESSMENT :   Pt seen for PT evaluation following admission for new LE/scrotal/abdominal edema, JANA. Pt encountered semi-reclined in bed in NAD, cleared by RN and agreeable to participate in therapy. Pt A&Ox4, though demos impaired attention/sequencing, jerking movements of UE at rest and with mobility, RN made aware. Pt c/o lightheadedness upon transfer to sit, VSS. Sit <> stand from EOB with RW/Min A, despite cueing for hand placement on bed pt pulled upon RW. Pt with R hip flexed/R ankle inverted, not WB through RLE despite cueing, shakiness in standing, returned to sit. Pt left semi-reclined in bed in NAD, cleared by RN and

## 2025-01-09 NOTE — PROGRESS NOTES
Attempted to schedule hospital follow up PCP appointment. Office  will like for pt to contact the office per office protocol. [facility] placed [facility] information AVS for patient resource. Pending patient discharge. Nedra Mar, Care Management Assistant

## 2025-01-09 NOTE — PLAN OF CARE
Problem: Occupational Therapy - Adult  Goal: By Discharge: Performs self-care activities at highest level of function for planned discharge setting.  See evaluation for individualized goals.  Description: FUNCTIONAL STATUS PRIOR TO ADMISSION:  The patient is a questionable historian but reports being independent with ADLs and functional mobility at baseline. He owns AD (canes, RW) but states he is not currently using them.  HOME SUPPORT: The patient lives with his wife and daughter.    Occupational Therapy Goals:  Initiated 1/9/2025  1.  Patient will perform grooming with Set-up seated in chair within 7 day(s).  2.  Patient will perform upper body dressing with Set-up within 7 day(s).  3.  Patient will perform lower body dressing with Moderate Assist within 7 day(s).  4.  Patient will perform toilet transfers with Minimal Assist  within 7 day(s).  5.  Patient will perform all aspects of toileting with Moderate Assist within 7 day(s).  6.  Patient will participate in upper extremity therapeutic exercise/activities with Supervision for 5 minutes within 7 day(s).      Outcome: Progressing  OCCUPATIONAL THERAPY EVALUATION    Patient: Piter Morales (69 y.o. male)  Date: 1/9/2025  Primary Diagnosis: Anasarca [R60.1]  SOB (shortness of breath) [R06.02]         Precautions: General Precautions, Fall Risk                  ASSESSMENT :  The patient is limited by decreased functional mobility, independence in ADLs, strength, activity tolerance, cognition, attention/concentration, coordination, and balance following admission for anasarca and SOB. He was received semisupine in bed, agreeable to participate. Pt was alert and oriented x4 but demo'd delayed processing and decreased attention throughout session. Pt also had frequent jerking movements in upper and lower extremities (increased in upper extremities) at rest and with activity. He transferred supine>sit with CGA, fair sitting balance and required frequent cues due         Balance:      Balance  Sitting: Intact  Standing: Impaired  Standing - Static: Constant support;Poor;Fair      ADL Assessment:          Feeding: Setup       Grooming: Minimal assistance       UE Bathing: Minimal assistance       Product Used : Chlorhexidine wipes    LE Bathing: Dependent/Total       UE Dressing: Minimal assistance       LE Dressing: Dependent/Total       Toileting: Maximum assistance          ADL Intervention and task modifications:                                                                                                                                                                                                                                         Barthel Index:    Barthel Index Scale  Feeding: Independent, Able to apply any necessary device. Feeds in reasonable time  Bathing: Cannot perform activity  Grooming: Washes face, monroe hair, brushes teeth, shaves (manages plug if electric razor)  Dressing: Needs help, but does at least half of task within reasonable time  Bowel Control: Occasional accidents or needs help with device  Bladder Control: Cannot perform activity (barroso)  Toilet Transfers: Needs help for balance, handling clothes or toilet paper  Chair/Bed Trannsfers: Able to sit, but needs maximum assistance to transfer  Ambulation: Cannot perform activity  Stairs: Cannot perform activity  Total Barthel Index Score: 35       The Barthel ADL Index: Guidelines  1. The index should be used as a record of what a patient does, not as a record of what a patient could do.  2. The main aim is to establish degree of independence from any help, physical or verbal, however minor and for whatever reason.  3. The need for supervision renders the patient not independent.  4. A patient's performance should be established using the best available evidence. Asking the patient, friends/relatives and nurses are the usual sources, but direct observation and common sense are also important.

## 2025-01-09 NOTE — PROGRESS NOTES
Nephrology Progress Note  [facility] / [place] Office  [address], [address]  [place] [address]  Phone - [phone]  Fax - [phone]                 Patient: Piter Morales                     YOB: 1955        Date- 1/9/2025                                     Admit Date: 1/4/2025   CC: Follow up for JANA stage III          IMPRESSION & PLAN:   JANA stage 3( sec to JOSUE,Labile hemodynamics, BP as low as 71/50 mmhg)( CT neg for hydro)  Metabolic alkalosis  Severe volume overload  Anemia  Afib RVR  HTN  MELBA      PLAN-  Excellent urine output(8 L since last 24 hours)  DC Bumex gtt. Today  Start on Bumex 2 mg twice daily  Will continue  albumin and midodrine for blood pressure support  Noticed worsening hypoxemia, ordered blood gas and chest x-ray  Check BMP daily  Ordered acetazolamide for metabolic alkalosis  Spoke to RN     Subjective:  Interval History:   -Seen and examined today  -More hypoxic today requiring 6 L of oxygen  -8 L urine output since last 24 hours  -Blood pressure study  -Creatinine better at 1.3    Objective:   Vitals:    01/09/25 0259 01/09/25 0341 01/09/25 0745 01/09/25 1020   BP: 137/82 137/82 (!) 142/70 126/83   Pulse: (!) 122 (!) 114 (!) 110 (!) 101   Resp:   18 18   Temp: 98.4 °F (36.9 °C) 98.2 °F (36.8 °C) 97.5 °F (36.4 °C) 97.2 °F (36.2 °C)   TempSrc: Oral Oral Oral Oral   SpO2: 93% 94% 93% 95%   Weight: (!) 170.3 kg (375 lb 7.1 oz)      Height:          I/O last 3 completed shifts:  In: 600 [P.O.:600]  Out: 6875 [Urine:6875]  I/O this shift:  In: 100 [P.O.:100]  Out: 2700 [Urine:2700]      Physical exam:    GEN: Moderate respiratory distress  NECK- no mass  RESP: No wheezing, decreased BS b/l  CVS: S1,S2  RRR  NEURO: Normal speech, Non focal  EXT: 1+ edema   : Cook catheter present    Chart reviewed.         Pertinent Notes reviewed.     Data Review :  Lab Results   Component Value Date/Time    NA

## 2025-01-09 NOTE — PROGRESS NOTES
include:  Recent Labs     01/07/25 0237   WBC 7.4   HGB 9.8*   HCT 32.3*        Recent Labs     01/06/25  0609 01/07/25  0237 01/08/25 0313    136 136   K 3.5 3.7 4.1   CL 95* 94* 92*   CO2 37* 36* 38*   GLUCOSE 109* 113* 104*   BUN 18 27* 31*   CREATININE 1.58* 3.09* 1.92*   CALCIUM 8.3* 7.8* 8.8   MG  --  1.9  --    PHOS  --   --  4.4       Signed: Sumit Fowler MD

## 2025-01-09 NOTE — PROGRESS NOTES
Progress Note      1/9/2025 8:14 AM  NAME: Piter Morales   MRN:  [id number]   Admit Diagnosis: Anasarca [R60.1]  SOB (shortness of breath) [R06.02]    Primary Cardiologist:  Dr Lewis   Physician Requesting consult: Dr Fowler        Assessment     HFpEF, RV dysfunction   JANA  Permanent Atrial Fibrillation s/p Afib ablation x 2 attempts  RBBB  Hypertension  Pulmonary Hypertension  Morbid Obesity  Obstructive Sleep Apnea  JANA   Anemia  Prostate Cancer History     Plan:     Diuretic per renal   Monitor renal function  Cont metoprolol and apixaban    Midodrine added due to low BP  Albumin as needed   PT/OT        Subjective:     HPI:   APPEARS LITTLE CONFUSED   No CP or SOB   Renal function stable   Diuresed well with bumex gtt     ROS: No CP, SOB, Abd pain, nausea, vomiting, syncope, palpitations, new focal neurological symptoms     Objective:      Physical Exam:    Last 24hrs VS reviewed since prior progress note. Most recent are:    BP (!) 142/70   Pulse (!) 110   Temp 97.5 °F (36.4 °C) (Oral)   Resp 18   Ht 1.854 m (6' 1\")   Wt (!) 170.3 kg (375 lb 7.1 oz)   SpO2 93%   BMI 49.53 kg/m²     Intake/Output Summary (Last 24 hours) at 1/9/2025 0814  Last data filed at 1/9/2025 0715  Gross per 24 hour   Intake 600 ml   Output 7675 ml   Net -7075 ml           General: Alert and oriented x3, no acute distress, obese   Neck: Supple   Respiratory: No respiratory distress, decreased  Cardiovascular: Irregular rate rhythm, S1S2, no murmur   Abdomen: soft, non tender, non distended   Neuro: moves all extremities, oriented x3   Skin: warm and dry   Extremity: +  edema, warm to touch        Data Review    Telemetry: aFIB       Lab Data Personally Reviewed:    Recent Labs     01/07/25  0237   WBC 7.4   HGB 9.8*   HCT 32.3*        No results for input(s): \"INR\", \"APTT\" in the last 72 hours.    Invalid input(s): \"PTP\"   Recent Labs     01/07/25  0237 01/08/25  0313 01/09/25  0315    136 135*  136   K  3.7 4.1 3.5  3.5   CL 94* 92* 87*  87*   CO2 36* 38* 42*  42*   BUN 27* 31* 32*  33*   MG 1.9  --   --      No results for input(s): \"CPK\" in the last 72 hours.    Invalid input(s): \"CPKMB\", \"CKNDX\", \"TROIQ\"  No results found for: \"CHOL\", \"CHLST\", \"CHOLV\", \"HDL\", \"HDLC\", \"LDL\", \"LDLC\"    No results for input(s): \"TP\", \"GLOB\", \"GGT\" in the last 72 hours.    Invalid input(s): \"SGOT\", \"GPT\", \"AP\", \"TBIL\", \"ALB\", \"AML\", \"AMYP\", \"LPSE\", \"HLPSE\"  No results for input(s): \"PH\", \"PCO2\", \"PO2\" in the last 72 hours.    Medications Personally Reviewed:    Current Facility-Administered Medications   Medication Dose Route Frequency    midodrine (PROAMATINE) tablet 10 mg  10 mg Oral TID    bumetanide (BUMEX) 12.5 mg in 50 mL infusion  0.5 mg/hr IntraVENous Continuous    albumin human 25% IV solution 25 g  25 g IntraVENous Q8H    ondansetron (ZOFRAN-ODT) disintegrating tablet 8 mg  8 mg Oral Q8H PRN    Or    ondansetron (ZOFRAN) injection 4 mg  4 mg IntraVENous Q6H PRN    albuterol sulfate HFA (PROVENTIL;VENTOLIN;PROAIR) 108 (90 Base) MCG/ACT inhaler 2 puff  2 puff Inhalation Q6H PRN    dermacerin (EUCERIN) cream   Topical BID    hydrOXYzine HCl (ATARAX) tablet 25 mg  25 mg Oral 4x Daily PRN    apixaban (ELIQUIS) tablet 5 mg  5 mg Oral BID    doxazosin (CARDURA) tablet 2 mg  2 mg Oral Daily    sodium chloride flush 0.9 % injection 5-40 mL  5-40 mL IntraVENous 2 times per day    sodium chloride flush 0.9 % injection 5-40 mL  5-40 mL IntraVENous PRN    0.9 % sodium chloride infusion   IntraVENous PRN    polyethylene glycol (GLYCOLAX) packet 17 g  17 g Oral Daily PRN    acetaminophen (TYLENOL) tablet 650 mg  650 mg Oral Q6H PRN    Or    acetaminophen (TYLENOL) suppository 650 mg  650 mg Rectal Q6H PRN    metoprolol succinate (TOPROL XL) extended release tablet 100 mg  100 mg Oral Daily              Annia Santiago MD

## 2025-01-09 NOTE — PLAN OF CARE
Problem: Safety - Adult  Goal: Free from fall injury  Outcome: Progressing     Problem: Skin/Tissue Integrity - Adult  Goal: Skin integrity remains intact  Outcome: Progressing     Problem: Respiratory - Adult  Goal: Achieves optimal ventilation and oxygenation  Outcome: Progressing  Flowsheets (Taken 1/9/2025 2469)  Achieves optimal ventilation and oxygenation: Assess for changes in respiratory status

## 2025-01-09 NOTE — PROGRESS NOTES
End of Shift Note    Bedside shift change report given to Nhi SOLOMON (oncoming nurse) by MATIAS SMALLWOOD, NEHA (offgoing nurse).  Report included the following information SBAR    Shift worked:  7am-7pm     Shift summary and any significant changes:     Patient has been disoriented demand more o2 sat drips MD notified has c-xry BG Bumex drip change to IV push BID      Concerns for physician to address:  None      Zone phone for oncoming shift:          Activity:  Level of Assistance: Minimal assist, patient does 75% or more  Number times ambulated in hallways past shift: 0  Number of times OOB to chair past shift: 0    Cardiac:   Cardiac Monitoring: Yes      Cardiac Rhythm: A fib RVR    Access:  Current line(s): PIV     Genitourinary:   Urinary Status: Cook    Respiratory:   O2 Device: Nasal cannula  Chronic home O2 use?: YES  Incentive spirometer at bedside: YES    GI:  Last BM (including prior to admit): 01/05/25  Current diet:  ADULT DIET; Full Liquid; 1200 ml  Passing flatus: YES    Pain Management:   Patient states pain is manageable on current regimen: YES    Skin:  Cong Scale Score: 16  Interventions: Wound Offloading (Prevention Methods): Pillows, Repositioning    Patient Safety:  Fall Risk: Nursing Judgement-Fall Risk High(Add Comments): Yes  Fall Risk Interventions  Nursing Judgement-Fall Risk High(Add Comments): Yes  Toilet Every 2 Hours-In Advance of Need: No (Comment)  Hourly Visual Checks: Awake, Quiet, In bed, Eyes closed  Fall Visual Posted: Armband, Fall sign posted  Room Door Open: Deferred to decrease stimulation  Alarm On: Bed  Patient Moved Closer to Nursing Station: No    Active Consults:   IP CONSULT TO PHARMACY  IP CONSULT TO CARDIOLOGY  IP CONSULT TO NEPHROLOGY    Length of Stay:  Expected LOS: 7  Actual LOS: 5    MATIAS SMALLWOOD, NEHA

## 2025-01-10 LAB
ALBUMIN SERPL-MCNC: 4 G/DL (ref 3.5–5)
ANION GAP SERPL CALC-SCNC: ABNORMAL MMOL/L (ref 2–12)
ARTERIAL PATENCY WRIST A: POSITIVE
ARTERIAL PATENCY WRIST A: YES
BASE EXCESS BLD CALC-SCNC: 9.5 MMOL/L
BASE EXCESS BLDA CALC-SCNC: 20 MMOL/L
BASOPHILS # BLD: 0.04 K/UL (ref 0–0.1)
BASOPHILS NFR BLD: 0.7 % (ref 0–1)
BDY SITE: ABNORMAL
BDY SITE: ABNORMAL
BUN SERPL-MCNC: 33 MG/DL (ref 6–20)
BUN/CREAT SERPL: 26 (ref 12–20)
CALCIUM SERPL-MCNC: 9.9 MG/DL (ref 8.5–10.1)
CHLORIDE SERPL-SCNC: 82 MMOL/L (ref 97–108)
CO2 SERPL-SCNC: >45 MMOL/L (ref 21–32)
CREAT SERPL-MCNC: 1.26 MG/DL (ref 0.7–1.3)
DIFFERENTIAL METHOD BLD: ABNORMAL
EOSINOPHIL # BLD: 1.32 K/UL (ref 0–0.4)
EOSINOPHIL NFR BLD: 22 % (ref 0–7)
ERYTHROCYTE [DISTWIDTH] IN BLOOD BY AUTOMATED COUNT: 15.6 % (ref 11.5–14.5)
FIO2 ON VENT: 35 %
GAS FLOW.O2 O2 DELIVERY SYS: ABNORMAL
GAS FLOW.O2 SETTING OXYMISER: 20
GAS FLOW.O2 SETTING OXYMISER: 20 BPM
GLUCOSE BLD STRIP.AUTO-MCNC: 119 MG/DL (ref 65–117)
GLUCOSE SERPL-MCNC: 107 MG/DL (ref 65–100)
HCO3 BLD-SCNC: 37.6 MMOL/L (ref 21–28)
HCO3 BLDA-SCNC: 48 MMOL/L (ref 22–26)
HCT VFR BLD AUTO: 35.1 % (ref 36.6–50.3)
HGB BLD-MCNC: 10.5 G/DL (ref 12.1–17)
IMM GRANULOCYTES # BLD AUTO: 0.01 K/UL (ref 0–0.04)
IMM GRANULOCYTES NFR BLD AUTO: 0.2 % (ref 0–0.5)
INSPIRATION.DURATION SETTING TIME VENT: 0.9 SEC
IPAP/PIP/HIGH PEEP: 21
IPAP/PIP: 25
LYMPHOCYTES # BLD: 0.66 K/UL (ref 0.8–3.5)
LYMPHOCYTES NFR BLD: 11 % (ref 12–49)
MCH RBC QN AUTO: 27.1 PG (ref 26–34)
MCHC RBC AUTO-ENTMCNC: 29.9 G/DL (ref 30–36.5)
MCV RBC AUTO: 90.7 FL (ref 80–99)
MONOCYTES # BLD: 0.56 K/UL (ref 0–1)
MONOCYTES NFR BLD: 9.3 % (ref 5–13)
NEUTS SEG # BLD: 3.41 K/UL (ref 1.8–8)
NEUTS SEG NFR BLD: 56.8 % (ref 32–75)
NRBC # BLD: 0 K/UL (ref 0–0.01)
NRBC BLD-RTO: 0 PER 100 WBC
O2/TOTAL GAS SETTING VFR VENT: 35 %
PCO2 BLD: 64.9 MMHG (ref 35–48)
PCO2 BLDA: 69 MMHG (ref 35–45)
PEEP RESPIRATORY: 8
PEEP RESPIRATORY: 8 CMH2O
PH BLD: 7.37 (ref 7.35–7.45)
PH BLDA: 7.47 (ref 7.35–7.45)
PHOSPHATE SERPL-MCNC: 2.9 MG/DL (ref 2.6–4.7)
PLATELET # BLD AUTO: 230 K/UL (ref 150–400)
PMV BLD AUTO: 10.4 FL (ref 8.9–12.9)
PO2 BLD: 66 MMHG (ref 83–108)
PO2 BLDA: 72 MMHG (ref 80–100)
POTASSIUM SERPL-SCNC: 3.1 MMOL/L (ref 3.5–5.1)
RBC # BLD AUTO: 3.87 M/UL (ref 4.1–5.7)
RBC MORPH BLD: ABNORMAL
SAO2 % BLD: 91.3 % (ref 92–97)
SAO2 % BLD: 95 % (ref 92–97)
SAO2% DEVICE SAO2% SENSOR NAME: ABNORMAL
SERVICE CMNT-IMP: ABNORMAL
SODIUM SERPL-SCNC: 136 MMOL/L (ref 136–145)
SPECIMEN SITE: ABNORMAL
SPECIMEN TYPE: ABNORMAL
VENTILATION MODE VENT: ABNORMAL
VENTILATION MODE VENT: ABNORMAL
VT SETTING VENT: 600
VT SETTING VENT: 600 ML
WBC # BLD AUTO: 6 K/UL (ref 4.1–11.1)

## 2025-01-10 PROCEDURE — 36415 COLL VENOUS BLD VENIPUNCTURE: CPT

## 2025-01-10 PROCEDURE — 97530 THERAPEUTIC ACTIVITIES: CPT | Performed by: OCCUPATIONAL THERAPIST

## 2025-01-10 PROCEDURE — 94660 CPAP INITIATION&MGMT: CPT

## 2025-01-10 PROCEDURE — 2060000000 HC ICU INTERMEDIATE R&B

## 2025-01-10 PROCEDURE — P9047 ALBUMIN (HUMAN), 25%, 50ML: HCPCS | Performed by: INTERNAL MEDICINE

## 2025-01-10 PROCEDURE — 80069 RENAL FUNCTION PANEL: CPT

## 2025-01-10 PROCEDURE — 82962 GLUCOSE BLOOD TEST: CPT

## 2025-01-10 PROCEDURE — 2700000000 HC OXYGEN THERAPY PER DAY

## 2025-01-10 PROCEDURE — 2500000003 HC RX 250 WO HCPCS: Performed by: INTERNAL MEDICINE

## 2025-01-10 PROCEDURE — 82803 BLOOD GASES ANY COMBINATION: CPT

## 2025-01-10 PROCEDURE — 97530 THERAPEUTIC ACTIVITIES: CPT

## 2025-01-10 PROCEDURE — 6370000000 HC RX 637 (ALT 250 FOR IP): Performed by: INTERNAL MEDICINE

## 2025-01-10 PROCEDURE — 36600 WITHDRAWAL OF ARTERIAL BLOOD: CPT

## 2025-01-10 PROCEDURE — 6360000002 HC RX W HCPCS: Performed by: INTERNAL MEDICINE

## 2025-01-10 PROCEDURE — 85025 COMPLETE CBC W/AUTO DIFF WBC: CPT

## 2025-01-10 RX ORDER — POTASSIUM CHLORIDE 750 MG/1
40 TABLET, EXTENDED RELEASE ORAL ONCE
Status: COMPLETED | OUTPATIENT
Start: 2025-01-10 | End: 2025-01-10

## 2025-01-10 RX ADMIN — DOXAZOSIN 2 MG: 2 TABLET ORAL at 09:36

## 2025-01-10 RX ADMIN — ALBUMIN (HUMAN) 25 G: 0.25 INJECTION, SOLUTION INTRAVENOUS at 04:21

## 2025-01-10 RX ADMIN — ACETAZOLAMIDE 500 MG: 250 TABLET ORAL at 09:37

## 2025-01-10 RX ADMIN — MIDODRINE HYDROCHLORIDE 10 MG: 5 TABLET ORAL at 09:37

## 2025-01-10 RX ADMIN — SKIN PROTECTANT: 33 OINTMENT TOPICAL at 09:44

## 2025-01-10 RX ADMIN — SODIUM CHLORIDE, PRESERVATIVE FREE 10 ML: 5 INJECTION INTRAVENOUS at 09:38

## 2025-01-10 RX ADMIN — SODIUM CHLORIDE, PRESERVATIVE FREE 10 ML: 5 INJECTION INTRAVENOUS at 21:29

## 2025-01-10 RX ADMIN — APIXABAN 5 MG: 5 TABLET, FILM COATED ORAL at 09:37

## 2025-01-10 RX ADMIN — SKIN PROTECTANT: 33 OINTMENT TOPICAL at 21:58

## 2025-01-10 RX ADMIN — APIXABAN 5 MG: 5 TABLET, FILM COATED ORAL at 21:28

## 2025-01-10 RX ADMIN — POTASSIUM CHLORIDE 40 MEQ: 750 TABLET, EXTENDED RELEASE ORAL at 09:37

## 2025-01-10 RX ADMIN — METOPROLOL SUCCINATE 100 MG: 50 TABLET, EXTENDED RELEASE ORAL at 09:36

## 2025-01-10 ASSESSMENT — PAIN SCALES - GENERAL
PAINLEVEL_OUTOF10: 0

## 2025-01-10 NOTE — PROGRESS NOTES
Rapid Response Note     S:    Asked by RRT nurse to evaluate patient due to myoclonic jerking.     O:    /64   Pulse (!) 112   Temp 98.6 °F (37 °C) (Oral)   Resp 24   Ht 1.524 m (5')   Wt 66 kg (145 lb 8.1 oz)   SpO2 98%   BMI 28.42 kg/m²   Exam notable for: Patient lying in bed, sitting upright, poor air movement bilaterally. Wakes to stimuli, oriented only to person and then falls back asleep.     A/P:  Acute toxic metabolic encephalopathy secondary to hypercarbic respiratory failure   - ABG from earlier today showing 7.39/87/128   - On exam, patient with myoclonic jerking, altered somnolent - blood gas obtained showing significant hypercarbia at 7.31 / 113   - Started on Bipap, tolerating well, volumes appropriate   - Evaluated by ICU overnight - plan to monitor closely, move to ICU if not improving on bipap   - Repeat gas showing 7.38 / 89 and then 7.37 / 65  - On repeat evaluation, myoclonic jerking now resolved, patient awake and talking and answering questions, oriented to person and place, thought year was 2005 (discussed with nursing, consistent with the previous night)   - Continue bipap overnight, wean in AM   - On diamox already for metabolic alkalosis         Maxwell Ray MD  10/6/2024      I have spent 31 minutes of critical care time involved in lab review, consultations with specialist, family decision- making, bedside attention and documentation. During this entire length of time I was

## 2025-01-10 NOTE — PROGRESS NOTES
End of Shift Note    Bedside shift change report given to NEHA Jasso (oncoming nurse) by Nhi Camargo RN (offgoing nurse).  Report included the following information SBAR    Shift worked:  [id number]     Shift summary and any significant changes:     Pt unable to arouse at beginning of shift. Rapid assessment RN notified. ABG's ordered. Dr. Ray at bedside. Respiratory at bedside to draw ABG's. CO2 113. Bipap started on patient. ABG's to be drawn 1 hour after bipap.    ABG's drawn 1 hour after bipap initiation. CO2 is 89.2.     0200 Dr. Ray at bedside to check on patient. ABG's ordered for 0300.    0300 Respiratory therapist at bedside to redraw ABG's. CO2  is 64.9. Pt more responsive, interacting with staff.    0600 Pt resting quietly. Bipap in place.      Concerns for physician to address:       Zone phone for oncoming shift:          Nhi Camargo, RN

## 2025-01-10 NOTE — PROGRESS NOTES
Chart reviewed and attempted to see patient for OT services. Discussed patient with RN. Patient still on BIPAP, not anticipated to tolerate skilled therapy at this time. Per RN, plan for another ABG soon and hopeful transition to NC. Will defer but continue to follow.

## 2025-01-10 NOTE — PROGRESS NOTES
Nephrology Progress Note  [facility] [place] Office  [address], [address]  [place] [address]  Phone - [phone]  Fax - [phone]                 Patient: Piter Morales                     YOB: 1955        Date- 1/10/2025                                     Admit Date: 1/4/2025   CC: Follow up for JANA stage III          IMPRESSION & PLAN:   JANA stage 3( sec to JOSUE,Labile hemodynamics, BP as low as 71/50 mmhg)( CT neg for hydro)  Metabolic alkalosis  Severe volume overload  Anemia  Afib RVR  HTN  MELBA  Acute hypercarbic respiratory failure      PLAN-  Urine output 6.8 L  DC all diuretics  Continue midodrine for hemodynamic support  Continue BiPAP, will need pulmonary evaluation  Reordered acetazolamide for metabolic alkalosis  Spoke to RN     Subjective:  Interval History:   -Seen and examined today  -Noticed interval events  -Blood gas showed hypercarbic respiratory failure and he was placed on BiPAP  -Complaining of itching    Objective:   Vitals:    01/10/25 0300 01/10/25 0318 01/10/25 0700 01/10/25 1034   BP: 114/77  (!) 128/99    Pulse: 88      Resp: 18   18   Temp: 98.2 °F (36.8 °C)   98.8 °F (37.1 °C)   TempSrc: Oral   Oral   SpO2: 94%      Weight:  (!) 167.4 kg (369 lb 0.8 oz)     Height:          I/O last 3 completed shifts:  In: 330 [P.O.:320; I.V.:10]  Out: 9700 [Urine:9700]  No intake/output data recorded.      Physical exam:    GEN: Mild respiratory distress  NECK- no mass  RESP: No wheezing, decreased BS b/l  CVS: S1,S2  RRR  NEURO: Confused  EXT: 1+ edema   : Cook catheter present    Chart reviewed.         Pertinent Notes reviewed.     Data Review :  Lab Results   Component Value Date/Time     01/10/2025 03:20 AM    K 3.1 01/10/2025 03:20 AM    CL 82 01/10/2025 03:20 AM    CO2 >45 01/10/2025 03:20 AM    BUN 33 01/10/2025 03:20 AM    CREATININE 1.26 01/10/2025 03:20 AM    GLUCOSE 107 01/10/2025  5-40 mL  5-40 mL IntraVENous PRN    0.9 % sodium chloride infusion   IntraVENous PRN    polyethylene glycol (GLYCOLAX) packet 17 g  17 g Oral Daily PRN    acetaminophen (TYLENOL) tablet 650 mg  650 mg Oral Q6H PRN    Or    acetaminophen (TYLENOL) suppository 650 mg  650 mg Rectal Q6H PRN    metoprolol succinate (TOPROL XL) extended release tablet 100 mg  100 mg Oral Daily        Neal Millan MD  1/10/2025

## 2025-01-10 NOTE — PROGRESS NOTES
Progress Note      1/10/2025 7:36 AM  NAME: Piter Morales   MRN:  [id number]   Admit Diagnosis: Anasarca [R60.1]  SOB (shortness of breath) [R06.02]    Primary Cardiologist:  Dr Lewis   Physician Requesting consult: Dr Fowler        Assessment     HFpEF, RV dysfunction   JANA  Permanent Atrial Fibrillation s/p Afib ablation x 2 attempts  RBBB  Hypertension  Pulmonary Hypertension  Morbid Obesity  Obstructive Sleep Apnea  JANA   Anemia  Prostate Cancer History  Hypercarbic respiratory failure, likely due to obstructive sleep apnea, obesity hypoventilation    Plan:     Diuretic per renal , on bumex   Acetazolamide for alkalosis   Monitor renal function  Continue midodrine for low blood pressure  Cont metoprolol and apixaban    Albumin as needed   PT/OT        Subjective:     HPI:  Patient somnolent last night and ABG shows hypercarbic respiratory failure, placed on BiPAP with improvement in ABG      ROS: No CP, SOB, Abd pain, nausea, vomiting, syncope, palpitations, new focal neurological symptoms     Objective:      Physical Exam:    Last 24hrs VS reviewed since prior progress note. Most recent are:    /77   Pulse 88   Temp 98.2 °F (36.8 °C) (Oral)   Resp 18   Ht 1.854 m (6' 1\")   Wt (!) 167.4 kg (369 lb 0.8 oz)   SpO2 94%   BMI 48.69 kg/m²     Intake/Output Summary (Last 24 hours) at 1/10/2025 0736  Last data filed at 1/10/2025 0650  Gross per 24 hour   Intake 330 ml   Output 4800 ml   Net -4470 ml           General: Alert and oriented x3, no acute distress, obese   Neck: Supple   Respiratory: No respiratory distress, decreased  Cardiovascular: Irregular rate rhythm, S1S2, no murmur   Abdomen: soft, non tender, non distended   Neuro: moves all extremities, oriented x3   Skin: warm and dry   Extremity: +  edema, warm to touch        Data Review    Telemetry: aFIB       Lab Data Personally Reviewed:    Recent Labs     01/10/25  0319   WBC 6.0   HGB 10.5*   HCT 35.1*        No results  for input(s): \"INR\", \"APTT\" in the last 72 hours.    Invalid input(s): \"PTP\"   Recent Labs     01/08/25  0313 01/09/25  0315 01/10/25  0320    135*  136 136   K 4.1 3.5  3.5 3.1*   CL 92* 87*  87* 82*   CO2 38* 42*  42* >45*   BUN 31* 32*  33* 33*     No results for input(s): \"CPK\" in the last 72 hours.    Invalid input(s): \"CPKMB\", \"CKNDX\", \"TROIQ\"  No results found for: \"CHOL\", \"CHLST\", \"CHOLV\", \"HDL\", \"HDLC\", \"LDL\", \"LDLC\"    No results for input(s): \"TP\", \"GLOB\", \"GGT\" in the last 72 hours.    Invalid input(s): \"SGOT\", \"GPT\", \"AP\", \"TBIL\", \"ALB\", \"AML\", \"AMYP\", \"LPSE\", \"HLPSE\"  No results for input(s): \"PH\", \"PCO2\", \"PO2\" in the last 72 hours.    Medications Personally Reviewed:    Current Facility-Administered Medications   Medication Dose Route Frequency    acetaZOLAMIDE (DIAMOX) tablet 500 mg  500 mg Oral Daily    bumetanide (BUMEX) injection 2 mg  2 mg IntraVENous BID    midodrine (PROAMATINE) tablet 10 mg  10 mg Oral TID    albumin human 25% IV solution 25 g  25 g IntraVENous Q8H    ondansetron (ZOFRAN-ODT) disintegrating tablet 8 mg  8 mg Oral Q8H PRN    Or    ondansetron (ZOFRAN) injection 4 mg  4 mg IntraVENous Q6H PRN    albuterol sulfate HFA (PROVENTIL;VENTOLIN;PROAIR) 108 (90 Base) MCG/ACT inhaler 2 puff  2 puff Inhalation Q6H PRN    dermacerin (EUCERIN) cream   Topical BID    hydrOXYzine HCl (ATARAX) tablet 25 mg  25 mg Oral 4x Daily PRN    apixaban (ELIQUIS) tablet 5 mg  5 mg Oral BID    doxazosin (CARDURA) tablet 2 mg  2 mg Oral Daily    sodium chloride flush 0.9 % injection 5-40 mL  5-40 mL IntraVENous 2 times per day    sodium chloride flush 0.9 % injection 5-40 mL  5-40 mL IntraVENous PRN    0.9 % sodium chloride infusion   IntraVENous PRN    polyethylene glycol (GLYCOLAX) packet 17 g  17 g Oral Daily PRN    acetaminophen (TYLENOL) tablet 650 mg  650 mg Oral Q6H PRN    Or    acetaminophen (TYLENOL) suppository 650 mg  650 mg Rectal Q6H PRN    metoprolol succinate (TOPROL XL) extended

## 2025-01-10 NOTE — PROGRESS NOTES
Hospitalist Progress Note    NAME:   Piter Morales   : 1955   MRN: [id number]     Date/Time: 1/10/2025 6:45 PM  Patient PCP: Jenni Ortiz MD    Estimated discharge date:  Barriers:       Assessment / Plan:    Respiratory acidosis/metabolic alkalosis with altered mental status  - Started on BiPAP  - pH of 7.31, previous CO2 of 1113, pO2 of 81 and bicarb of 57 consistent with respiratory acidosis and metabolic alkalosis (most likely secondary to hypoventilation syndrome as well as contraction alkalosis due to Bumex  -Consider BiPAP overnight  - ABG today showed improvement    Rapid response was called around 6 PM on 01/10/2021  - Because the patient became unresponsive, 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 we asked for pulmonary consult      Acute kidney injury  - Oliguric kidney injury, creatinine jumped to 3.09  - Possible contrast-induced nephropathy, drug reaction, ATN due to hypotension  - CT of abdomen and pelvis was done to evaluate for source of anasarca, given patient has no evidence of right or left heart failure on exam  - Consulted with nephrologist, started on Bumex drip, transferred to intermediate care unit, started on IV albumin  -- doing better today    New onset lower extremity/scrotal/abdominal edema possibly secondary to CHF exacerbation triggered by A-fib  -Started on Bumex drip per consulting nephrologist  -Significant improvement of lower extremity edema , scrotal edema and abdominal wall edema  - Echocardiogram during this admission showed preserved ejection fraction no significant valvular abnormality, as per cardiology note patient diagnosed with diastolic heart failure RV dysfunction and permanent A-fib status post ablation x 2, also patient has a history of pulmonary hypertension/sleep apnea  -Last ablation was in   -Chest x-ray did not show evidence of pulmonary edema lungs were clear to auscultation, BNP is elevated at 3000  - Cardiology consult  -  Daily weight  -Strict RODRICK's    Contraction alkalosis   - Most likely secondary to Bumex drip  - Bumex drip was discontinued t, started on Diamox by consulting nephrologist, started on Bumex IV twice daily  - CO2 trending up, today's 45,  ABG today showed metabolic alkalosis with respiratory acidosis, patient on the high side 7.47, stop diuretic, continue Diamox,    Myoclonus jerks  - Most likely secondary to contraction alkalosis and elevated CO2  - Bumex drip was discontinued, started on Diamox oral Bumex    Reported altered mental status this morning/hypoxia  - Possibly secondary to CO2 retention, ABG showed metabolic alkalosis and respiratory acidosis  - pH is within normal limits  -Most likely secondary to hypoventilation syndrome    Severe pruritis  - Most likely secondary to dry skin  - Started on Eucerin cream    History of A-fib  - Currently r heart rate in the 100  Continue Eliquis,   cont beta-blocker     Hypoalbuminemia   possibly secondary to hepatic congestion     Hypertension  - Fairly controlled   -Hold lisinopril     Asthma  - No wheezing        History of prostate cancer  - Status post radiation, currently patient is not taking any medication  - prostate cancer treated with ADT     LUTS   on doxazosin                  Medical Decision Making:   I personally reviewed labs:  I personally reviewed imaging:  I personally reviewed EKG:  Toxic drug monitoring:   Discussed case with:         Code Status:   DVT Prophylaxis:   GI Prophylaxis:    Subjective:     Chief Complaint / Reason for Physician Visit  Discussed with RN events overnight.       Objective:     VITALS:   Last 24hrs VS reviewed since prior progress note. Most recent are:  Patient Vitals for the past 24 hrs:   BP Temp Temp src Pulse Resp SpO2 Weight   01/10/25 1645 127/84 98.7 °F (37.1 °C) Oral (!) 110 17 94 % --   01/10/25 1600 122/69 97.7 °F (36.5 °C) Oral (!) 108 20 (!) 89 % --   01/10/25 1316 -- -- -- 97 -- 94 % --   01/10/25 1100 126/76

## 2025-01-10 NOTE — PLAN OF CARE
Problem: Physical Therapy - Adult  Goal: By Discharge: Performs mobility at highest level of function for planned discharge setting.  See evaluation for individualized goals.  Description: FUNCTIONAL STATUS PRIOR TO ADMISSION: Pt questionable historian though reports he was independent and active without use of DME.    HOME SUPPORT PRIOR TO ADMISSION: The patient lived with family but states he did not require assistance.    Physical Therapy Goals  Initiated 1/9/2025  1.  Patient will move from supine to sit and sit to supine in bed with independence within 7 day(s).    2.  Patient will perform sit to stand with modified independence within 7 day(s).  3.  Patient will transfer from bed to chair and chair to bed with modified independence using the least restrictive device within 7 day(s).  4.  Patient will ambulate with modified independence for 100 feet with the least restrictive device within 7 day(s).   5.  Patient will ascend/descend 5 stairs with handrail(s) with modified independence within 7 day(s).   Outcome: Progressing     PHYSICAL THERAPY TREATMENT    Patient: Piter Morales (69 y.o. male)  Date: 1/10/2025  Diagnosis: Anasarca [R60.1]  SOB (shortness of breath) [R06.02] Anasarca      Precautions: Fall Risk                    ASSESSMENT:  Patient continues to benefit from skilled PT services and is progressing towards goals. Patient required BIPAP for hypercarbia since previous therapy session and now transitioned to 4L nasal cannula. Arrived to patient with nasal cannula out of nose with desaturation to 83% and patient reporting no one had told him to keep oxygen on. After oxygen re-applied, oxygen sats improved to ~88%. SpO2 improved to 94% sitting edge of bed in upright. Patient demonstrating improved functional mobility this session without myoclonic jerking in B LEs and only some small jerking noted in UEs. Able to progress to out of bed to chair with overall minimal assist with rolling walker with

## 2025-01-10 NOTE — PLAN OF CARE
Problem: Occupational Therapy - Adult  Goal: By Discharge: Performs self-care activities at highest level of function for planned discharge setting.  See evaluation for individualized goals.  Description: FUNCTIONAL STATUS PRIOR TO ADMISSION:  The patient is a questionable historian but reports being independent with ADLs and functional mobility at baseline. He owns AD (canes, RW) but states he is not currently using them.  HOME SUPPORT: The patient lives with his wife and daughter.    Occupational Therapy Goals:  Initiated 1/9/2025  1.  Patient will perform grooming with Set-up seated in chair within 7 day(s).  2.  Patient will perform upper body dressing with Set-up within 7 day(s).  3.  Patient will perform lower body dressing with Moderate Assist within 7 day(s).  4.  Patient will perform toilet transfers with Minimal Assist  within 7 day(s).  5.  Patient will perform all aspects of toileting with Moderate Assist within 7 day(s).  6.  Patient will participate in upper extremity therapeutic exercise/activities with Supervision for 5 minutes within 7 day(s).      Outcome: Progressing   OCCUPATIONAL THERAPY TREATMENT  Patient: Piter Morales (69 y.o. male)  Date: 1/10/2025  Primary Diagnosis: Anasarca [R60.1]  SOB (shortness of breath) [R06.02]       Precautions: Fall Risk                Chart, occupational therapy assessment, plan of care, and goals were reviewed.    ASSESSMENT  Patient continues to benefit from skilled OT services and is progressing towards goals. Pt was off BIPAP and was transitioned to 4L NC upon arrival.  Pt has needed BIPAP due to high CO2 levels.  Mild myoclonic jerking noted in BUE with activity, but BUE are functional.  CGA for supine to sit with head of bed raised.  Constant cues needed for problem solving and safety with mobility.  Min assist to stand turn to bedside recliner with Rw.  O2 sat was 86% with mobility on 4L NC and O2 improves to 94% over time with rest.  Nurse notified

## 2025-01-10 NOTE — SIGNIFICANT EVENT
[facility]  RAPID RESPONSE TEAM   Rapid Response NOTE       Overhead rapid response called to room # 2142/01  @ 1642 for patient Piter Morales , MRN# [id number]      S- Reason for rapid response: unresponsive  Per primary RN: patient unresponsive to voice and gentle shaking & sternal rub performed by primary RN, RRT called, patient responsive at this time, sitting up in chair since around early afternoon.  Background: patent admitted with anasarca on 1/4/25. Elevated probnp. On bipap for Co2 retention and diuresed until today but stopped due to metabolic alkalosis and getting diamox.      O/A- Focused Assessment:   Cardiac- afib rate controlled, bp stable, afebrile.   Respiratory- On 5LNC, sats 93%  Nuero- confused to time, able to follow command- delayed in response. Pupil equal and reactive.  GI/- voiding via barroso catheter,   MSK/Skin- generalized weakness and lower extremities edema +2  Lines/drain- PIV x2  Labs/chart reviewed-  Yes.  Patient intermittently awake  denies sob and chest pain, he was assisted back to bed x3 assist and sera steady, drowsy mostly and drifts back to sleep, labs reviewed, recent abg 7.47 ph and elevated PCO2 and HCO3.       Interventions:      Dr. Fowler at bedside, orders received for the following Interventions:   - BIPAP Qhs and as needed   - will repeat abg if does not improve with bipap  - continue diamox as scheduled.  - Pulmonary consult.    - cardio and nephro already on board.     P- Outcome:   Patient more awake, but drowsy falls back to sleep, able to converse with his wife briefly and wanted rest. Back on bipap. Pending pulm recs.     Patient Disposition:   Patient transferred to higher level of care following RRT?: No.         Thank you for this RRT call. Patient/family education provided as applicable.   Care Collaborated with primary RN, CRN, RT, and attending MD.   Please refer to the flowsheet for detailed vital signs during this event.   See  MD notes for details and plan of care.     Code Status: Full Code   Attending MD: Smuit Fowler MD   Vitals:    01/10/25 1645   BP: 127/84   Pulse: (!) 110   Resp: 17   Temp: 98.7 °F (37.1 °C)   SpO2: 94%          Cristobal, BSN, RN, CCRN, TCRN  Rapid Response Team  Ext [phone]

## 2025-01-10 NOTE — PROGRESS NOTES
Hospitalist Progress Note    NAME:   Piter Morales   : 1955   MRN: [id number]     Date/Time: 2025 11:45 PM  Patient PCP: Jenni Ortiz MD    Estimated discharge date:  Barriers:       Assessment / Plan:    Acute kidney injury  - Oliguric kidney injury, creatinine jumped to 3.09  - Possible contrast-induced nephropathy, drug reaction, ATN due to hypotension  - CT of abdomen and pelvis was done to evaluate for source of anasarca, given patient has no evidence of right or left heart failure on exam  - Consulted with nephrologist, started on Bumex drip, transferred to intermediate care unit, started on IV albumin  -- doing better today    New onset lower extremity/scrotal/abdominal edema possibly secondary to CHF exacerbation triggered by A-fib  -Started on Bumex drip per consulting nephrologist  -Significant improvement of lower extremity edema , scrotal edema and abdominal wall edema  - Echocardiogram during this admission showed preserved ejection fraction no significant valvular abnormality, as per cardiology note patient diagnosed with diastolic heart failure RV dysfunction and permanent A-fib status post ablation x 2, also patient has a history of pulmonary hypertension/sleep apnea  -Last ablation was in   -Chest x-ray did not show evidence of pulmonary edema lungs were clear to auscultation, BNP is elevated at 3000  - Cardiology consult  - Daily weight  -Strict RODRICK's    Contraction alkalosis   - Most likely secondary to Bumex drip  - Bumex drip was discontinued today, started on Diamox by consulting nephrologist  - CO2 of 42    Myoclonus jerks  - Most likely secondary to contraction alkalosis and elevated CO2  - Bumex drip was discontinued, started on Diamox oral Bumex    Reported altered mental status this morning/hypoxia  - Possibly secondary to CO2 retention, ABG showed metabolic alkalosis and respiratory acidosis  - pH is within normal limits  -Most likely secondary to  hypoventilation syndrome    Severe pruritis  - Most likely secondary to dry skin  - Started on Eucerin cream    History of A-fib  - Currently r heart rate in the 100  Continue Eliquis,   cont beta-blocker     Hypoalbuminemia   possibly secondary to hepatic congestion     Hypertension  - Fairly controlled   -Hold lisinopril     Asthma  - No wheezing        History of prostate cancer  - Status post radiation, currently patient is not taking any medication  - prostate cancer treated with ADT     LUTS   on doxazosin                  Medical Decision Making:   I personally reviewed labs:  I personally reviewed imaging:  I personally reviewed EKG:  Toxic drug monitoring:   Discussed case with:         Code Status:   DVT Prophylaxis:   GI Prophylaxis:    Subjective:     Chief Complaint / Reason for Physician Visit  Discussed with RN events overnight.       Objective:     VITALS:   Last 24hrs VS reviewed since prior progress note. Most recent are:  Patient Vitals for the past 24 hrs:   BP Temp Temp src Pulse Resp SpO2 Weight   01/09/25 2302 111/66 -- -- 92 -- -- --   01/09/25 2136 126/74 -- -- -- -- -- --   01/09/25 1945 122/73 98.5 °F (36.9 °C) Oral 96 20 91 % --   01/09/25 1400 -- 97.2 °F (36.2 °C) Oral -- 20 93 % --   01/09/25 1020 126/83 97.2 °F (36.2 °C) Oral (!) 101 18 95 % --   01/09/25 0745 (!) 142/70 97.5 °F (36.4 °C) Oral (!) 110 18 93 % --   01/09/25 0341 137/82 98.2 °F (36.8 °C) Oral (!) 114 -- 94 % --   01/09/25 0259 137/82 98.4 °F (36.9 °C) Oral (!) 122 -- 93 % (!) 170.3 kg (375 lb 7.1 oz)         Intake/Output Summary (Last 24 hours) at 1/9/2025 2345  Last data filed at 1/9/2025 2200  Gross per 24 hour   Intake 330 ml   Output 7500 ml   Net -7170 ml        I had a face to face encounter and independently examined this patient on 1/9/2025, as outlined below:  PHYSICAL EXAM:  General: Alert, cooperative  EENT:  EOMI. Anicteric sclerae.  Resp:  CTA bilaterally, no wheezing or rales.  No accessory muscle

## 2025-01-10 NOTE — PROGRESS NOTES
Physical Therapy    Chart reviewed. Attempted to see patient for skilled therapy. Discussed patient with RN. Patient still on BIPAP, not anticipated to tolerate skilled therapy at this time. Per RN, plan for another ABG soon and hopeful transition to NC. Will defer.    Angie Leonard, PT, DPT

## 2025-01-10 NOTE — PROGRESS NOTES
2015: Called to bedside by primary RN due to patient AMS, lethargy and decreased responsiveness. ABG was done at 1630 and patient was hypercapneic with CO2 of 87. Orders placed for STAT ABG at this time.    2040: ABG done and CO2 is now 113. Spoke with MD SEBASTIEN Ray and received orders to place patient on continuous BIPAP at this time. Will recheck a blood gas one hour after initiating BIPAP.    Farzana Alfred RN  Rapid Response Team  EXT [phone]

## 2025-01-10 NOTE — PROGRESS NOTES
End of Shift Note    Bedside shift change report given to rn (oncoming nurse) by Louisa Martinez RN (offgoing nurse).  Report included the following information SBAR    Shift worked:  700-1900     Shift summary and any significant changes:     Transitioned from Bipap to 4L N.C  around lunch time. Patient was lethargic and slouched over in chair. He was only aroused by sternal rub. RRT called. MD to bedside. Vitals stable. Patient still lethargic, but able to correctly answer mentation questions.  No labs ordered at this time, but patients CO2 was previously elevated, so patient was placed back on Bipap (See RRT note for details) Patient returned to bed with sera steady x3 assist.     Concerns for physician to address:       Zone phone for oncoming shift:          Activity:  Level of Assistance: Minimal assist, patient does 75% or more  Number times ambulated in hallways past shift: 0  Number of times OOB to chair past shift: 1    Cardiac:   Cardiac Monitoring: Yes      Cardiac Rhythm: Sinus rhythm    Access:  Current line(s): PIV     Genitourinary:   Urinary Status: Cook    Respiratory:   O2 Device: Nasal cannula  Chronic home O2 use?: NO  Incentive spirometer at bedside: YES    GI:  Last BM (including prior to admit): 01/05/25  Current diet:  DIET ONE TIME MESSAGE;  ADULT DIET; Regular  Passing flatus: YES    Pain Management:   Patient states pain is manageable on current regimen: YES    Skin:  Cong Scale Score: 18  Interventions: Wound Offloading (Prevention Methods): Pillows, Repositioning    Patient Safety:  Fall Risk: Nursing Judgement-Fall Risk High(Add Comments): Yes  Fall Risk Interventions  Nursing Judgement-Fall Risk High(Add Comments): Yes  Toilet Every 2 Hours-In Advance of Need: Yes  Hourly Visual Checks: Awake, In bed  Fall Visual Posted: Armband  Room Door Open: Yes  Alarm On: Bed, Chair  Patient Moved Closer to Nursing Station: No    Active Consults:   IP CONSULT TO PHARMACY  IP CONSULT TO

## 2025-01-11 LAB
ALBUMIN SERPL-MCNC: 3.7 G/DL (ref 3.5–5)
ANION GAP SERPL CALC-SCNC: ABNORMAL MMOL/L (ref 2–12)
BUN SERPL-MCNC: 35 MG/DL (ref 6–20)
BUN/CREAT SERPL: 30 (ref 12–20)
CALCIUM SERPL-MCNC: 9 MG/DL (ref 8.5–10.1)
CHLORIDE SERPL-SCNC: 81 MMOL/L (ref 97–108)
CO2 SERPL-SCNC: >45 MMOL/L (ref 21–32)
CREAT SERPL-MCNC: 1.15 MG/DL (ref 0.7–1.3)
GLUCOSE SERPL-MCNC: 102 MG/DL (ref 65–100)
PHOSPHATE SERPL-MCNC: 2 MG/DL (ref 2.6–4.7)
POTASSIUM SERPL-SCNC: 2.9 MMOL/L (ref 3.5–5.1)
SODIUM SERPL-SCNC: 136 MMOL/L (ref 136–145)

## 2025-01-11 PROCEDURE — 94660 CPAP INITIATION&MGMT: CPT

## 2025-01-11 PROCEDURE — 2500000003 HC RX 250 WO HCPCS: Performed by: INTERNAL MEDICINE

## 2025-01-11 PROCEDURE — 6370000000 HC RX 637 (ALT 250 FOR IP): Performed by: INTERNAL MEDICINE

## 2025-01-11 PROCEDURE — 36415 COLL VENOUS BLD VENIPUNCTURE: CPT

## 2025-01-11 PROCEDURE — 2060000000 HC ICU INTERMEDIATE R&B

## 2025-01-11 PROCEDURE — 2700000000 HC OXYGEN THERAPY PER DAY

## 2025-01-11 PROCEDURE — 80069 RENAL FUNCTION PANEL: CPT

## 2025-01-11 RX ORDER — ACETAZOLAMIDE 250 MG/1
500 TABLET ORAL DAILY
Status: DISCONTINUED | OUTPATIENT
Start: 2025-01-11 | End: 2025-01-13

## 2025-01-11 RX ORDER — POTASSIUM CHLORIDE 750 MG/1
40 TABLET, EXTENDED RELEASE ORAL EVERY 6 HOURS
Status: COMPLETED | OUTPATIENT
Start: 2025-01-11 | End: 2025-01-11

## 2025-01-11 RX ADMIN — SKIN PROTECTANT: 33 OINTMENT TOPICAL at 09:10

## 2025-01-11 RX ADMIN — APIXABAN 5 MG: 5 TABLET, FILM COATED ORAL at 09:10

## 2025-01-11 RX ADMIN — POTASSIUM CHLORIDE 40 MEQ: 750 TABLET, EXTENDED RELEASE ORAL at 14:30

## 2025-01-11 RX ADMIN — SODIUM CHLORIDE, PRESERVATIVE FREE 10 ML: 5 INJECTION INTRAVENOUS at 20:41

## 2025-01-11 RX ADMIN — SKIN PROTECTANT: 33 OINTMENT TOPICAL at 20:40

## 2025-01-11 RX ADMIN — METOPROLOL SUCCINATE 100 MG: 50 TABLET, EXTENDED RELEASE ORAL at 09:10

## 2025-01-11 RX ADMIN — APIXABAN 5 MG: 5 TABLET, FILM COATED ORAL at 20:40

## 2025-01-11 RX ADMIN — MIDODRINE HYDROCHLORIDE 10 MG: 5 TABLET ORAL at 09:10

## 2025-01-11 RX ADMIN — ACETAZOLAMIDE 500 MG: 250 TABLET ORAL at 10:20

## 2025-01-11 RX ADMIN — MIDODRINE HYDROCHLORIDE 10 MG: 5 TABLET ORAL at 14:30

## 2025-01-11 RX ADMIN — DOXAZOSIN 2 MG: 2 TABLET ORAL at 09:10

## 2025-01-11 RX ADMIN — SODIUM CHLORIDE, PRESERVATIVE FREE 10 ML: 5 INJECTION INTRAVENOUS at 09:11

## 2025-01-11 RX ADMIN — POTASSIUM CHLORIDE 40 MEQ: 750 TABLET, EXTENDED RELEASE ORAL at 10:20

## 2025-01-11 ASSESSMENT — PAIN SCALES - GENERAL
PAINLEVEL_OUTOF10: 0

## 2025-01-11 NOTE — PROGRESS NOTES
End of Shift Note    Bedside shift change report given to  (oncoming nurse) by Jessica Davenport RN (offgoing nurse).  Report included the following information SBAR    Shift worked:  UC&S ordered barroso greater than 3 days ,Pt refused removal and reinserted      Shift summary and any significant changes:          Concerns for physician to address:       Zone phone for oncoming shift:          Activity:  Level of Assistance: Maximum assist, patient does 25-49%  Number times ambulated in hallways past shift: 0  Number of times OOB to chair past shift: 0    Cardiac:   Cardiac Monitoring: Yes      Cardiac Rhythm: Atrial fib    Access:  Current line(s): PIV     Genitourinary:   Urinary Status: Barroso, Draining    Respiratory:   O2 Device: PAP (positive airway pressure)  Chronic home O2 use?: NO  Incentive spirometer at bedside: YES    GI:  Last BM (including prior to admit): 01/05/25 (as charted)  Current diet:  DIET ONE TIME MESSAGE;  ADULT DIET; Regular  Passing flatus: YES    Pain Management:   Patient states pain is manageable on current regimen: YES    Skin:  Cong Scale Score: 18  Interventions: Wound Offloading (Prevention Methods): Bed, pressure reduction mattress, Pillows, Repositioning, Turning    Patient Safety:  Fall Risk: Nursing Judgement-Fall Risk High(Add Comments): Yes  Fall Risk Interventions  Nursing Judgement-Fall Risk High(Add Comments): Yes  Toilet Every 2 Hours-In Advance of Need: Yes  Hourly Visual Checks: In bed, Eyes closed  Fall Visual Posted: Armband  Room Door Open: Yes  Alarm On: Bed, Chair  Patient Moved Closer to Nursing Station: Yes    Active Consults:   IP CONSULT TO PHARMACY  IP CONSULT TO CARDIOLOGY  IP CONSULT TO NEPHROLOGY  IP CONSULT TO PULMONOLOGY    Length of Stay:  Expected LOS: 9  Actual LOS: 6    Jessica Davenport RN

## 2025-01-11 NOTE — CONSULTS
Pulmonary, Critical Care, and Sleep Medicine    Initial Patient Consult    Name: Piter Morales MRN: [id number]   : 1955 Hospital: [facility]   Date: 2025        IMPRESSION:   Acute/chronic hypercapnic respiratory failure  Acute hypoxic respiratory failure  MELBA with likely OHS, not on treatment as his old insurance apparently would not cover CPAP  Asthma, on Breo/albuterol at home  HFpEF  Afib s/p ablation x 2  RV dysfunction  Anasarca  Pulmonary HTN, group 2/3  JANA  Prostate cancer   Obesity       RECOMMENDATIONS:   O2, wean as tolerated  Bronchodilators. Will add hospital equivalent of his home Breo  He will definitely need a repeat sleep study and likely BiPAP titration after he recovers  Diuresis per cardiology/nephrology  Agree with judicious use of acetazolamide, I will reorder.  His baseline serum bicarbonate is likely mid-30s  DVT prophylaxis  Will follow.  Thanks for the consult.      Subjective:     This patient has been seen and evaluated at the request of Dr. Fowler for suspected MELAB/OHS. Patient is a 69 y.o. male with complex medical history.  He was admitted a week ago with anasarca.  He had been on steroids for a very long time from his urologist as part of treatment for prostate cancer. During that time he gained 60+ pounds, going from the low-300s to the high 300's.  He has a history of atrial fibrillation with 2 failed ablations recently. He was diuresed here with subsequent JANA.  Eventually he developed AMS with hypercapnia and transiently required BiPAP.  He reports that he was diagnosed with MELBA by home sleep test several years ago but he was never on treatment as the insurance apparently would not cover his CPAP (?).  His asthma has been managed by his PCP with Breo and albuterol, but he began to need his albuterol several times per day in November when he stopped using predisone.       Past Medical History:   Diagnosis Date    Arrhythmia     Afib     recorded.  Last 3 shifts: 01/09 1901 - 01/11 0700  In: 1190 [P.O.:1180; I.V.:10]  Out: 4390 [Urine:4390]    Intake/Output Summary (Last 24 hours) at 1/11/2025 0953  Last data filed at 1/11/2025 0330  Gross per 24 hour   Intake 880 ml   Output 1890 ml   Net -1010 ml      Physical Exam:   General:  Alert, cooperative, no distress, obese   Head:  Normocephalic, without obvious abnormality, atraumatic.   Eyes:  Conjunctivae/corneas clear.    Nose: Nares normal. Septum midline. Mucosa normal.    Throat: Lips, mucosa, and tongue normal. Teeth and gums normal.   Neck: Supple, symmetrical, trachea midline    Back:   Symmetric, no curvature. ROM normal.   Lungs:   Clear to auscultation bilaterally.   Chest wall:  No tenderness or deformity.   Heart:  Irregular, distant heart tones   Abdomen:   Soft, non-tender. Bowel sounds normal.    Extremities: Extremities normal, atraumatic, no cyanosis, +edema   Skin: Skin color, texture, turgor normal. No rashes or lesions   Lymph nodes: Cervical, supraclavicular nodes normal.   Neurologic: Grossly nonfocal     Data review:     Recent Results (from the past 24 hour(s))   Blood Gas, Arterial    Collection Time: 01/10/25  1:06 PM   Result Value Ref Range    pH, Arterial 7.47 (H) 7.35 - 7.45      pCO2, Arterial 69 (H) 35 - 45 mmHg    pO2, Arterial 72 (L) 80 - 100 mmHg    POC O2 SAT 95 92 - 97 %    HCO3, Arterial 48 (H) 22 - 26 mmol/L    Base Excess, Arterial 20.0 mmol/L    O2 Method BIPAP      FIO2 Arterial 35 %    Mode BILEVEL      POC TIDAL VOLUME 600.0      Set Rate, POC 20      IPAP/PIP 25      POC PEEP/CPA 8.0      Source ARTERIAL      Site RIGHT RADIAL      Wili Test YES     POCT Glucose    Collection Time: 01/10/25  4:44 PM   Result Value Ref Range    POC Glucose 119 (H) 65 - 117 mg/dL    Performed by: Hussain Linares PCT    Renal Function Panel    Collection Time: 01/11/25  3:34 AM   Result Value Ref Range    Sodium 136 136 - 145 mmol/L    Potassium 2.9 (L) 3.5 - 5.1 mmol/L

## 2025-01-11 NOTE — PROGRESS NOTES
End of Shift Note    Bedside shift change report given to Tracy SOLOMON (oncoming nurse) by MATIAS SMALLWOOD, NEHA (offgoing nurse).  Report included the following information SBAR    Shift worked:  7am-7pm     Shift summary and any significant changes:     He is more alert throughout the shift less o2 required 2 LPM is tolerated. At 1645 F/C removed will continued monitoring for void.       Concerns for physician to address:  none     Zone phone for oncoming shift:          Activity:  Level of Assistance: Maximum assist, patient does 25-49%  Number times ambulated in hallways past shift: 0  Number of times OOB to chair past shift: 0    Cardiac:   Cardiac Monitoring: Yes      Cardiac Rhythm: Atrial fib    Access:  Current line(s): PIV     Genitourinary:   Urinary Status: Cook, Patent    Respiratory:   O2 Device: Nasal cannula  Chronic home O2 use?: YES  Incentive spirometer at bedside: NO    GI:  Last BM (including prior to admit): 01/05/25 (as charted)  Current diet:  DIET ONE TIME MESSAGE;  ADULT DIET; Regular  Passing flatus: YES    Pain Management:   Patient states pain is manageable on current regimen: YES    Skin:  Cong Scale Score: 18  Interventions: Wound Offloading (Prevention Methods): Pillows, Repositioning    Patient Safety:  Fall Risk: Nursing Judgement-Fall Risk High(Add Comments): Yes  Fall Risk Interventions  Nursing Judgement-Fall Risk High(Add Comments): Yes  Toilet Every 2 Hours-In Advance of Need: No (Comment)  Hourly Visual Checks: In bed, Eyes closed  Fall Visual Posted: Armband  Room Door Open: Yes  Alarm On: Bed, Chair  Patient Moved Closer to Nursing Station: Yes    Active Consults:   IP CONSULT TO PHARMACY  IP CONSULT TO CARDIOLOGY  IP CONSULT TO NEPHROLOGY  IP CONSULT TO PULMONOLOGY    Length of Stay:  Expected LOS: 9  Actual LOS: 7    MATIAS SMALLWOOD, RN

## 2025-01-11 NOTE — PROGRESS NOTES
Physician Progress Note      PATIENT:               ALISON GALINDO  CSN #:                  [id number]  :                       1955  ADMIT DATE:       2025 7:09 AM  DISCH DATE:  RESPONDING  PROVIDER #:        Sumit Fowler MD          QUERY TEXT:    Good morning  Pt admitted with HFpEF exacerbation.  Pt noted to have SOB.    If possible, please document in the progress notes and discharge summary if   you are evaluating and/or treating any of the following:    The medical record reflects the following:  Risk Factors: HFpEF, cough, sputum production, SOB, JAMESON, wheezing, pleuritic   pain, obesity, MELBA  Clinical Indicators: Patient presented with leg swelling and itchiness,   swelling in both testicles. Noted with 3+ bilateral lower extremity edema and   weeping on exam. Desaturation to 88% on RA and paced on 4L NC  VBG on : pH-7.30/ pCO2-85.7/ HCO3-41   PN \"Ordered Xopenex neb x1 due to present HR elevation, likely hr   elevated in setting 2/2 dyspnea\"  Treatment: daily labs, CXR, supplemental O2, Albuterol, Xopenex, Rapid   Response    Thank you  Cherelle Garsia RN [facility]  [phone]  Options provided:  -- Acute respiratory failure with hypoxia  -- Acute respiratory failure with hypercapnia  -- Acute respiratory failure with hypoxia and hypercapnia  -- Other - I will add my own diagnosis  -- Disagree - Not applicable / Not valid  -- Disagree - Clinically unable to determine / Unknown  -- Refer to Clinical Documentation Reviewer    PROVIDER RESPONSE TEXT:    This patient is in acute respiratory failure with hypoxia and hypercapnia.    Query created by: Cherelle Lechuga on 2025 10:12 AM      Electronically signed by:  Sumit Fowler MD 2025 11:16 AM

## 2025-01-11 NOTE — PLAN OF CARE
Problem: Safety - Adult  Goal: Free from fall injury  Outcome: Progressing     Problem: Cardiovascular - Adult  Goal: Maintains optimal cardiac output and hemodynamic stability  Outcome: Progressing  Flowsheets (Taken 1/10/2025 1915)  Maintains optimal cardiac output and hemodynamic stability:   Monitor blood pressure and heart rate   Monitor urine output and notify Licensed Independent Practitioner for values outside of normal range   Assess for signs of decreased cardiac output  Goal: Absence of cardiac dysrhythmias or at baseline  Outcome: Progressing  Flowsheets (Taken 1/10/2025 1915)  Absence of cardiac dysrhythmias or at baseline:   Monitor cardiac rate and rhythm   Assess for signs of decreased cardiac output   Administer antiarrhythmia medication and electrolyte replacement as ordered     Problem: Skin/Tissue Integrity - Adult  Goal: Skin integrity remains intact  Outcome: Progressing  Flowsheets (Taken 1/10/2025 1915)  Skin Integrity Remains Intact: Monitor for areas of redness and/or skin breakdown  Goal: Oral mucous membranes remain intact  Outcome: Progressing  Flowsheets (Taken 1/10/2025 1915)  Oral Mucous Membranes Remain Intact:   Assess oral mucosa and hygiene practices   Implement preventative oral hygiene regimen     Problem: Respiratory - Adult  Goal: Achieves optimal ventilation and oxygenation  Outcome: Progressing  Flowsheets (Taken 1/10/2025 1915)  Achieves optimal ventilation and oxygenation:   Assess for changes in respiratory status   Assess for changes in mentation and behavior   Position to facilitate oxygenation and minimize respiratory effort   Oxygen supplementation based on oxygen saturation or arterial blood gases     Problem: Discharge Planning  Goal: Discharge to home or other facility with appropriate resources  Outcome: Progressing  Flowsheets (Taken 1/10/2025 1915)  Discharge to home or other facility with appropriate resources: Identify barriers to discharge with patient and

## 2025-01-11 NOTE — PROGRESS NOTES
Progress Note      1/11/2025 9:24 AM  NAME: Piter Morales   MRN:  [id number]   Admit Diagnosis: Anasarca [R60.1]  SOB (shortness of breath) [R06.02]    Primary Cardiologist:  Dr Lewis   Physician Requesting consult: Dr Fowler        Assessment     HFpEF, RV dysfunction   JANA  Permanent Atrial Fibrillation s/p Afib ablation x 2 attempts  RBBB  Hypertension  Pulmonary Hypertension  Morbid Obesity  Obstructive Sleep Apnea  JANA   Anemia  Prostate Cancer History  Hypercarbic respiratory failure, likely due to obstructive sleep apnea, obesity hypoventilation    Plan:     Diuretic per renal , on bumex   Acetazolamide for alkalosis   Monitor renal function  Continue midodrine for low blood pressure  Cont metoprolol and apixaban    Albumin as needed   PT/OT     1/11:  Replete K, no sig edema, taper O2       Subjective:     HPI:  Patient somnolent last night and ABG shows hypercarbic respiratory failure, placed on BiPAP with improvement in ABG      ROS: No CP, SOB, Abd pain, nausea, vomiting, syncope, palpitations, new focal neurological symptoms     Objective:      Physical Exam:    Last 24hrs VS reviewed since prior progress note. Most recent are:    BP (!) 123/90   Pulse 79   Temp 97.5 °F (36.4 °C) (Oral)   Resp 18   Ht 1.854 m (6' 1\")   Wt (!) 166 kg (365 lb 15.4 oz)   SpO2 95%   BMI 48.28 kg/m²     Intake/Output Summary (Last 24 hours) at 1/11/2025 0924  Last data filed at 1/11/2025 0330  Gross per 24 hour   Intake 880 ml   Output 1890 ml   Net -1010 ml           General: Alert and oriented x3, no acute distress, obese   Neck: Supple   Respiratory: No respiratory distress, decreased  Cardiovascular: Irregular rate rhythm, S1S2, no murmur   Abdomen: soft, non tender, non distended   Neuro: moves all extremities, oriented x3   Skin: warm and dry   Extremity: +  edema, warm to touch        Data Review    Telemetry: aFIB       Lab Data Personally Reviewed:    Recent Labs     01/10/25  0319   WBC 6.0

## 2025-01-11 NOTE — PROGRESS NOTES
Renal function stable, CO2 > 45  Cont acetazolamide  Hold other diuretics  Daily labs  Call with any issues over the weekend

## 2025-01-11 NOTE — PLAN OF CARE
for standing, transferring and ambulating with or without assistive devices   Assist with transfers and ambulation using safe patient handling equipment as needed   Ensure adequate protection for wounds/incisions during mobilization   Obtain physical therapy/occupational therapy consults as needed   Apply continuous passive motion per provider or physical therapy orders to increase flexion toward goal   Instruct patient/family in ordered activity level  Goal: Maintain proper alignment of affected body part  1/10/2025 2314 by Jessica Davenport RN  Outcome: Progressing  Flowsheets (Taken 1/10/2025 1915)  Maintain proper alignment of affected body part:   Support and protect limb and body alignment per provider's orders   Instruct and reinforce with patient and family use of appropriate assistive device and precautions (e.g. spinal or hip dislocation precautions)  Goal: Return ADL status to a safe level of function  1/10/2025 2314 by Jessica Davenport RN  Outcome: Progressing  Flowsheets (Taken 1/10/2025 1915)  Return ADL Status to a Safe Level of Function: Administer medication as ordered     Problem: Gastrointestinal - Adult  Goal: Maintains or returns to baseline bowel function  Recent Flowsheet Documentation  Taken 1/11/2025 0818 by Troy Liu RN  Maintains or returns to baseline bowel function: Assess bowel function  1/10/2025 2314 by Jessica Davenport RN  Outcome: Progressing  Goal: Maintains adequate nutritional intake  Recent Flowsheet Documentation  Taken 1/11/2025 0818 by Troy Liu RN  Maintains adequate nutritional intake: Monitor percentage of each meal consumed  1/10/2025 2314 by Jessica Davenport RN  Outcome: Progressing     Problem: Genitourinary - Adult  Goal: Absence of urinary retention  1/10/2025 2314 by Jessica Davenport RN  Outcome: Progressing  Goal: Urinary catheter remains patent  1/10/2025 2314 by Jessica Davenport RN  Outcome: Progressing  Flowsheets (Taken 1/10/2025 1915)  Urinary catheter

## 2025-01-12 LAB
ANION GAP SERPL CALC-SCNC: 4 MMOL/L (ref 2–12)
BUN SERPL-MCNC: 33 MG/DL (ref 6–20)
BUN/CREAT SERPL: 32 (ref 12–20)
CALCIUM SERPL-MCNC: 9.2 MG/DL (ref 8.5–10.1)
CHLORIDE SERPL-SCNC: 90 MMOL/L (ref 97–108)
CO2 SERPL-SCNC: 42 MMOL/L (ref 21–32)
CREAT SERPL-MCNC: 1.04 MG/DL (ref 0.7–1.3)
GLUCOSE SERPL-MCNC: 98 MG/DL (ref 65–100)
MAGNESIUM SERPL-MCNC: 1.8 MG/DL (ref 1.6–2.4)
POTASSIUM SERPL-SCNC: 3.2 MMOL/L (ref 3.5–5.1)
SODIUM SERPL-SCNC: 136 MMOL/L (ref 136–145)

## 2025-01-12 PROCEDURE — 2500000003 HC RX 250 WO HCPCS: Performed by: INTERNAL MEDICINE

## 2025-01-12 PROCEDURE — 6370000000 HC RX 637 (ALT 250 FOR IP): Performed by: INTERNAL MEDICINE

## 2025-01-12 PROCEDURE — 80048 BASIC METABOLIC PNL TOTAL CA: CPT

## 2025-01-12 PROCEDURE — 83735 ASSAY OF MAGNESIUM: CPT

## 2025-01-12 PROCEDURE — 6370000000 HC RX 637 (ALT 250 FOR IP): Performed by: HOSPITALIST

## 2025-01-12 PROCEDURE — 2060000000 HC ICU INTERMEDIATE R&B

## 2025-01-12 PROCEDURE — 36415 COLL VENOUS BLD VENIPUNCTURE: CPT

## 2025-01-12 PROCEDURE — 2700000000 HC OXYGEN THERAPY PER DAY

## 2025-01-12 PROCEDURE — 94660 CPAP INITIATION&MGMT: CPT

## 2025-01-12 RX ORDER — POTASSIUM CHLORIDE 1500 MG/1
40 TABLET, EXTENDED RELEASE ORAL ONCE
Status: COMPLETED | OUTPATIENT
Start: 2025-01-12 | End: 2025-01-12

## 2025-01-12 RX ORDER — BUMETANIDE 1 MG/1
1 TABLET ORAL 2 TIMES DAILY
Status: DISCONTINUED | OUTPATIENT
Start: 2025-01-13 | End: 2025-01-16 | Stop reason: HOSPADM

## 2025-01-12 RX ADMIN — MIDODRINE HYDROCHLORIDE 10 MG: 5 TABLET ORAL at 08:39

## 2025-01-12 RX ADMIN — HYDROXYZINE HYDROCHLORIDE 25 MG: 25 TABLET, FILM COATED ORAL at 10:54

## 2025-01-12 RX ADMIN — SKIN PROTECTANT: 33 OINTMENT TOPICAL at 22:21

## 2025-01-12 RX ADMIN — METOPROLOL SUCCINATE 100 MG: 50 TABLET, EXTENDED RELEASE ORAL at 08:39

## 2025-01-12 RX ADMIN — POTASSIUM CHLORIDE 40 MEQ: 1500 TABLET, EXTENDED RELEASE ORAL at 06:51

## 2025-01-12 RX ADMIN — APIXABAN 5 MG: 5 TABLET, FILM COATED ORAL at 22:20

## 2025-01-12 RX ADMIN — SODIUM CHLORIDE, PRESERVATIVE FREE 10 ML: 5 INJECTION INTRAVENOUS at 08:41

## 2025-01-12 RX ADMIN — MIDODRINE HYDROCHLORIDE 10 MG: 5 TABLET ORAL at 22:21

## 2025-01-12 RX ADMIN — APIXABAN 5 MG: 5 TABLET, FILM COATED ORAL at 08:39

## 2025-01-12 RX ADMIN — SODIUM CHLORIDE, PRESERVATIVE FREE 10 ML: 5 INJECTION INTRAVENOUS at 22:22

## 2025-01-12 RX ADMIN — SKIN PROTECTANT: 33 OINTMENT TOPICAL at 08:39

## 2025-01-12 RX ADMIN — ACETAZOLAMIDE 500 MG: 250 TABLET ORAL at 08:39

## 2025-01-12 RX ADMIN — DOXAZOSIN 2 MG: 2 TABLET ORAL at 08:39

## 2025-01-12 ASSESSMENT — PAIN SCALES - GENERAL: PAINLEVEL_OUTOF10: 0

## 2025-01-12 NOTE — PROGRESS NOTES
Hospitalist Progress Note    NAME:   Piter Morales   : 1955   MRN: [id number]     Date/Time: 2025 6:22 PM  Patient PCP: Jenni Ortiz MD    Estimated discharge date:  Barriers:       Assessment / Plan:    Respiratory acidosis/metabolic alkalosis with altered mental status 1/10  - pH of 7.31, previous CO2 of 113, pO2 of 81 and bicarb of 57 consistent with respiratory acidosis and metabolic alkalosis (most likely secondary to hypoventilation syndrome as well as contraction alkalosis due to Bumex  - responded to  BiPAP  -Continue BiPAP overnight  - ABG today improvement    Rapid response was called around 6 PM on 01/10/2021  - Because the patient became unresponsive, 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 and he fell asleep we asked for pulmonary consult      Acute kidney injury  - Oliguric kidney injury, creatinine jumped to 3.09  - Possible contrast-induced nephropathy, drug reaction, ATN due to hypotension  - CT of abdomen and pelvis was done to evaluate for source of anasarca, given patient has no evidence of right or left heart failure on exam  - Consulted with nephrologist, started on Bumex drip, transferred to intermediate care unit, started on IV albumin  -- Resolved    New onset lower extremity/scrotal/abdominal edema possibly secondary to CHF exacerbation triggered by A-fib  -Started on Bumex drip per consulting nephrologist  -Significant improvement of lower extremity edema , scrotal edema and abdominal wall edema  - Echocardiogram during this admission showed preserved ejection fraction no significant valvular abnormality, as per cardiology note patient diagnosed with diastolic heart failure RV dysfunction and permanent A-fib status post ablation x 2, also patient has a history of pulmonary hypertension/sleep apnea  -Last ablation was in   -Chest x-ray did not show evidence of pulmonary edema lungs were clear to auscultation, BNP is elevated at 3000  - Cardiology

## 2025-01-12 NOTE — PLAN OF CARE
Problem: Safety - Adult  Goal: Free from fall injury  Outcome: Progressing     Problem: Cardiovascular - Adult  Goal: Maintains optimal cardiac output and hemodynamic stability  Outcome: Progressing     Problem: Skin/Tissue Integrity - Adult  Goal: Skin integrity remains intact  Outcome: Progressing  Flowsheets (Taken 1/12/2025 0000)  Skin Integrity Remains Intact: Monitor for areas of redness and/or skin breakdown     Problem: Respiratory - Adult  Goal: Achieves optimal ventilation and oxygenation  Outcome: Progressing

## 2025-01-12 NOTE — PROGRESS NOTES
Progress Note      1/12/2025 9:09 AM  NAME: Piter Morales   MRN:  [id number]   Admit Diagnosis: Anasarca [R60.1]  SOB (shortness of breath) [R06.02]    Primary Cardiologist:  Dr Lewis   Physician Requesting consult: Dr Fowler        Assessment     HFpEF, RV dysfunction   JANA  Permanent Atrial Fibrillation s/p Afib ablation x 2 attempts  RBBB  Hypertension  Pulmonary Hypertension  Morbid Obesity  Obstructive Sleep Apnea  JANA   Anemia  Prostate Cancer History  Hypercarbic respiratory failure, likely due to obstructive sleep apnea, obesity hypoventilation    Plan:     Diuretic per renal , on bumex   Acetazolamide for alkalosis   Monitor renal function  Continue midodrine for low blood pressure  Cont metoprolol and apixaban    Albumin as needed   PT/OT     1/11:  Replete K, no sig edema, taper O2  1/12: Feeling better down to 2L O2, no changed from cardiac perspective.       Subjective:     HPI:  Patient somnolent last night and ABG shows hypercarbic respiratory failure, placed on BiPAP with improvement in ABG      ROS: No CP, SOB, Abd pain, nausea, vomiting, syncope, palpitations, new focal neurological symptoms     Objective:      Physical Exam:    Last 24hrs VS reviewed since prior progress note. Most recent are:    BP (!) 125/95   Pulse (!) 109   Temp 97.9 °F (36.6 °C) (Oral)   Resp 18   Ht 1.854 m (6' 1\")   Wt (!) 165.9 kg (365 lb 11.9 oz)   SpO2 93%   BMI 48.25 kg/m²     Intake/Output Summary (Last 24 hours) at 1/12/2025 0909  Last data filed at 1/12/2025 0600  Gross per 24 hour   Intake 1205 ml   Output 2150 ml   Net -945 ml           General: Alert and oriented x3, no acute distress, obese   Neck: Supple   Respiratory: No respiratory distress, decreased  Cardiovascular: Irregular rate rhythm, S1S2, no murmur   Abdomen: soft, non tender, non distended   Neuro: moves all extremities, oriented x3   Skin: warm and dry   Extremity: +  edema, warm to touch        Data Review    Telemetry: aFIB

## 2025-01-12 NOTE — PROGRESS NOTES
Hospitalist Progress Note    NAME:   Piter Morales   : 1955   MRN: [id number]     Date/Time: 2025 7:31 PM  Patient PCP: Jenni Ortiz MD    Estimated discharge date:  Barriers:       Assessment / Plan:    Respiratory acidosis/metabolic alkalosis with altered mental status 1/10  - Started on BiPAP  - pH of 7.31, previous CO2 of 113, pO2 of 81 and bicarb of 57 consistent with respiratory acidosis and metabolic alkalosis (most likely secondary to hypoventilation syndrome as well as contraction alkalosis due to Bumex  -Consider BiPAP overnight  - ABG today showed improvement    Rapid response was called around 6 PM on 01/10/2021  - Because the patient became unresponsive, 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 we asked for pulmonary consult      Acute kidney injury  - Oliguric kidney injury, creatinine jumped to 3.09  - Possible contrast-induced nephropathy, drug reaction, ATN due to hypotension  - CT of abdomen and pelvis was done to evaluate for source of anasarca, given patient has no evidence of right or left heart failure on exam  - Consulted with nephrologist, started on Bumex drip, transferred to intermediate care unit, started on IV albumin  -- doing better today    New onset lower extremity/scrotal/abdominal edema possibly secondary to CHF exacerbation triggered by A-fib  -Started on Bumex drip per consulting nephrologist  -Significant improvement of lower extremity edema , scrotal edema and abdominal wall edema  - Echocardiogram during this admission showed preserved ejection fraction no significant valvular abnormality, as per cardiology note patient diagnosed with diastolic heart failure RV dysfunction and permanent A-fib status post ablation x 2, also patient has a history of pulmonary hypertension/sleep apnea  -Last ablation was in   -Chest x-ray did not show evidence of pulmonary edema lungs were clear to auscultation, BNP is elevated at 3000  - Cardiology  0745 (!) 123/90 97.5 °F (36.4 °C) Oral 79 18 94 % --   01/11/25 0347 130/82 98.1 °F (36.7 °C) Oral 88 15 95 % --   01/11/25 0203 (!) 108/58 -- -- 85 -- -- --   01/11/25 0115 -- -- -- -- -- -- (!) 166 kg (365 lb 15.4 oz)   01/10/25 2332 (!) 143/102 98.8 °F (37.1 °C) Axillary (!) 110 20 94 % --   01/10/25 2202 125/82 -- -- (!) 115 -- -- --         Intake/Output Summary (Last 24 hours) at 1/11/2025 1931  Last data filed at 1/11/2025 1800  Gross per 24 hour   Intake 1680 ml   Output 3400 ml   Net -1720 ml        I had a face to face encounter and independently examined this patient on 1/11/2025, as outlined below:  PHYSICAL EXAM:  General: Alert, cooperative  EENT:  EOMI. Anicteric sclerae.  Resp:  CTA bilaterally, no wheezing or rales.  No accessory muscle use  CV:  Regular  rhythm,  No edema  GI:  Soft, Non distended, Non tender.  +Bowel sounds  Neurologic:  Alert and oriented X 3, normal speech,   Psych:   Good insight. Not anxious nor agitated  Skin:  No rashes.  No jaundice    Reviewed most current lab test results and cultures  YES  Reviewed most current radiology test results   YES  Review and summation of old records today    NO  Reviewed patient's current orders and MAR    YES  PMH/SH reviewed - no change compared to H&P    Procedures: see electronic medical records for all procedures/Xrays and details which were not copied into this note but were reviewed prior to creation of Plan.      LABS:  I reviewed today's most current labs and imaging studies.  Pertinent labs include:  Recent Labs     01/10/25  0319   WBC 6.0   HGB 10.5*   HCT 35.1*        Recent Labs     01/09/25  0315 01/10/25  0320 01/11/25  0334   *  136 136 136   K 3.5  3.5 3.1* 2.9*   CL 87*  87* 82* 81*   CO2 42*  42* >45* >45*   GLUCOSE 101*  104* 107* 102*   BUN 32*  33* 33* 35*   CREATININE 1.37*  1.34* 1.26 1.15   CALCIUM 9.0  9.1 9.9 9.0   PHOS 3.0 2.9 2.0*       Signed: Sumit Fowler MD

## 2025-01-13 LAB
ALBUMIN SERPL-MCNC: 3.6 G/DL (ref 3.5–5)
ANION GAP SERPL CALC-SCNC: 3 MMOL/L (ref 2–12)
ANION GAP SERPL CALC-SCNC: 5 MMOL/L (ref 2–12)
BUN SERPL-MCNC: 28 MG/DL (ref 6–20)
BUN SERPL-MCNC: 28 MG/DL (ref 6–20)
BUN/CREAT SERPL: 28 (ref 12–20)
BUN/CREAT SERPL: 29 (ref 12–20)
CALCIUM SERPL-MCNC: 9.5 MG/DL (ref 8.5–10.1)
CALCIUM SERPL-MCNC: 9.7 MG/DL (ref 8.5–10.1)
CHLORIDE SERPL-SCNC: 91 MMOL/L (ref 97–108)
CHLORIDE SERPL-SCNC: 91 MMOL/L (ref 97–108)
CO2 SERPL-SCNC: 37 MMOL/L (ref 21–32)
CO2 SERPL-SCNC: 38 MMOL/L (ref 21–32)
CREAT SERPL-MCNC: 0.96 MG/DL (ref 0.7–1.3)
CREAT SERPL-MCNC: 1.01 MG/DL (ref 0.7–1.3)
GLUCOSE SERPL-MCNC: 101 MG/DL (ref 65–100)
GLUCOSE SERPL-MCNC: 104 MG/DL (ref 65–100)
MAGNESIUM SERPL-MCNC: 2 MG/DL (ref 1.6–2.4)
PHOSPHATE SERPL-MCNC: 2.8 MG/DL (ref 2.6–4.7)
PHOSPHATE SERPL-MCNC: 2.9 MG/DL (ref 2.6–4.7)
POTASSIUM SERPL-SCNC: 3.2 MMOL/L (ref 3.5–5.1)
POTASSIUM SERPL-SCNC: 3.2 MMOL/L (ref 3.5–5.1)
SODIUM SERPL-SCNC: 132 MMOL/L (ref 136–145)
SODIUM SERPL-SCNC: 133 MMOL/L (ref 136–145)

## 2025-01-13 PROCEDURE — 97116 GAIT TRAINING THERAPY: CPT

## 2025-01-13 PROCEDURE — 6370000000 HC RX 637 (ALT 250 FOR IP): Performed by: INTERNAL MEDICINE

## 2025-01-13 PROCEDURE — 94660 CPAP INITIATION&MGMT: CPT

## 2025-01-13 PROCEDURE — 80048 BASIC METABOLIC PNL TOTAL CA: CPT

## 2025-01-13 PROCEDURE — 2700000000 HC OXYGEN THERAPY PER DAY

## 2025-01-13 PROCEDURE — 2060000000 HC ICU INTERMEDIATE R&B

## 2025-01-13 PROCEDURE — 84100 ASSAY OF PHOSPHORUS: CPT

## 2025-01-13 PROCEDURE — 80069 RENAL FUNCTION PANEL: CPT

## 2025-01-13 PROCEDURE — 83735 ASSAY OF MAGNESIUM: CPT

## 2025-01-13 PROCEDURE — 36415 COLL VENOUS BLD VENIPUNCTURE: CPT

## 2025-01-13 PROCEDURE — 97535 SELF CARE MNGMENT TRAINING: CPT

## 2025-01-13 PROCEDURE — 2500000003 HC RX 250 WO HCPCS: Performed by: INTERNAL MEDICINE

## 2025-01-13 RX ORDER — LACTOBACILLUS RHAMNOSUS GG 10B CELL
1 CAPSULE ORAL
Status: DISCONTINUED | OUTPATIENT
Start: 2025-01-13 | End: 2025-01-16 | Stop reason: HOSPADM

## 2025-01-13 RX ORDER — ACETAZOLAMIDE 250 MG/1
500 TABLET ORAL 2 TIMES DAILY
Status: COMPLETED | OUTPATIENT
Start: 2025-01-13 | End: 2025-01-14

## 2025-01-13 RX ORDER — POTASSIUM CHLORIDE 750 MG/1
40 TABLET, EXTENDED RELEASE ORAL ONCE
Status: COMPLETED | OUTPATIENT
Start: 2025-01-13 | End: 2025-01-13

## 2025-01-13 RX ADMIN — POTASSIUM CHLORIDE 40 MEQ: 750 TABLET, FILM COATED, EXTENDED RELEASE ORAL at 12:11

## 2025-01-13 RX ADMIN — SKIN PROTECTANT: 33 OINTMENT TOPICAL at 22:34

## 2025-01-13 RX ADMIN — MIDODRINE HYDROCHLORIDE 10 MG: 5 TABLET ORAL at 09:04

## 2025-01-13 RX ADMIN — METOPROLOL SUCCINATE 100 MG: 50 TABLET, EXTENDED RELEASE ORAL at 09:04

## 2025-01-13 RX ADMIN — ACETAZOLAMIDE 500 MG: 250 TABLET ORAL at 18:37

## 2025-01-13 RX ADMIN — SKIN PROTECTANT: 33 OINTMENT TOPICAL at 09:06

## 2025-01-13 RX ADMIN — SODIUM CHLORIDE, PRESERVATIVE FREE 10 ML: 5 INJECTION INTRAVENOUS at 22:33

## 2025-01-13 RX ADMIN — MIDODRINE HYDROCHLORIDE 10 MG: 5 TABLET ORAL at 16:59

## 2025-01-13 RX ADMIN — BUMETANIDE 1 MG: 1 TABLET ORAL at 18:37

## 2025-01-13 RX ADMIN — SODIUM CHLORIDE, PRESERVATIVE FREE 10 ML: 5 INJECTION INTRAVENOUS at 09:07

## 2025-01-13 RX ADMIN — BUMETANIDE 1 MG: 1 TABLET ORAL at 09:05

## 2025-01-13 RX ADMIN — POLYETHYLENE GLYCOL 3350 17 G: 17 POWDER, FOR SOLUTION ORAL at 06:58

## 2025-01-13 RX ADMIN — POTASSIUM CHLORIDE 40 MEQ: 750 TABLET, FILM COATED, EXTENDED RELEASE ORAL at 18:37

## 2025-01-13 RX ADMIN — APIXABAN 5 MG: 5 TABLET, FILM COATED ORAL at 22:33

## 2025-01-13 RX ADMIN — ACETAZOLAMIDE 500 MG: 250 TABLET ORAL at 09:04

## 2025-01-13 RX ADMIN — APIXABAN 5 MG: 5 TABLET, FILM COATED ORAL at 09:05

## 2025-01-13 RX ADMIN — DOXAZOSIN 2 MG: 2 TABLET ORAL at 09:05

## 2025-01-13 ASSESSMENT — PAIN SCALES - GENERAL
PAINLEVEL_OUTOF10: 0

## 2025-01-13 NOTE — PLAN OF CARE
Problem: Occupational Therapy - Adult  Goal: By Discharge: Performs self-care activities at highest level of function for planned discharge setting.  See evaluation for individualized goals.  Description: FUNCTIONAL STATUS PRIOR TO ADMISSION:  The patient is a questionable historian but reports being independent with ADLs and functional mobility at baseline. He owns AD (canes, RW) but states he is not currently using them.  HOME SUPPORT: The patient lives with his wife and daughter.    Occupational Therapy Goals:  Initiated 1/9/2025  1.  Patient will perform grooming with Set-up seated in chair within 7 day(s).  2.  Patient will perform upper body dressing with Set-up within 7 day(s).  3.  Patient will perform lower body dressing with Moderate Assist within 7 day(s).  4.  Patient will perform toilet transfers with Minimal Assist  within 7 day(s).  5.  Patient will perform all aspects of toileting with Moderate Assist within 7 day(s).  6.  Patient will participate in upper extremity therapeutic exercise/activities with Supervision for 5 minutes within 7 day(s).      Outcome: Progressing   OCCUPATIONAL THERAPY TREATMENT  Patient: Piter Morales (69 y.o. male)  Date: 1/13/2025  Primary Diagnosis: Anasarca [R60.1]  SOB (shortness of breath) [R06.02]       Precautions: Fall Risk                Chart, occupational therapy assessment, plan of care, and goals were reviewed.    ASSESSMENT  Patient continues to benefit from skilled OT services and is progressing towards goals. Pt tolerated OT session well. Demonstrates improvement with activity tolerance, standing balance, standing tolerance, strength and cognition. Presents with fair/good standing balance, mobilizing at a grossly SBA level with ADLs. BLE remain edematous, but improved since admission, per patient. Continues to require assist for LB dressing d/t edema and may benefit from AE. Pt continues to benefit from acute OT services while admitted. Anticipate          Activity Tolerance:   Fair   Please refer to the flowsheet for vital signs taken during this treatment.    After treatment:   Patient left in no apparent distress sitting up in chair, Call bell within reach, and Bed/ chair alarm activated    COMMUNICATION/EDUCATION:   The patient's plan of care was discussed with: physical therapist, registered nurse, and     Patient Education  Education Given To: Patient  Education Provided: Role of Therapy;Transfer Training;Plan of Care;Mobility Training;Fall Prevention Strategies  Education Method: Verbal  Barriers to Learning: None  Education Outcome: Verbalized understanding;Demonstrated understanding;Continued education needed    Thank you for this referral.  Rosita Zavala OT  Minutes: 23

## 2025-01-13 NOTE — PROGRESS NOTES
End of Shift Note    Bedside shift change report given to Nhi SOLOMON (oncoming nurse) by Josesito Arzola, RN (offgoing nurse).  Report included the following information SBAR, Kardex, Intake/Output, MAR, Recent Results, and Cardiac Rhythm Afib    Shift worked:  8974-8139     Shift summary and any significant changes:     VSS no changes to condition     Concerns for physician to address:       Zone phone for oncoming shift:          Activity:  Level of Assistance: Moderate assist, patient does 50-74%  Number times ambulated in hallways past shift: 0  Number of times OOB to chair past shift: 0    Cardiac:   Cardiac Monitoring: Yes      Cardiac Rhythm: Atrial fib    Access:  Current line(s): PIV     Genitourinary:   Urinary Status: Voiding, External catheter    Respiratory:   O2 Device: Nasal cannula  Chronic home O2 use?: NO  Incentive spirometer at bedside: YES    GI:  Last BM (including prior to admit): 01/05/25 (as charted)  Current diet:  DIET ONE TIME MESSAGE;  ADULT DIET; Regular  Passing flatus: YES    Pain Management:   Patient states pain is manageable on current regimen: YES    Skin:  Cong Scale Score: 17  Interventions: Wound Offloading (Prevention Methods): Bed, pressure reduction mattress, Pillows, Repositioning, Turning (turns self)    Patient Safety:  Fall Risk: Nursing Judgement-Fall Risk High(Add Comments): Yes  Fall Risk Interventions  Nursing Judgement-Fall Risk High(Add Comments): Yes  Toilet Every 2 Hours-In Advance of Need: Yes  Hourly Visual Checks: In bed, Eyes closed  Fall Visual Posted: Socks, Fall sign posted  Room Door Open: Deferred to promote rest  Alarm On: Bed  Patient Moved Closer to Nursing Station: No    Active Consults:   IP CONSULT TO PHARMACY  IP CONSULT TO CARDIOLOGY  IP CONSULT TO NEPHROLOGY  IP CONSULT TO PULMONOLOGY    Length of Stay:  Expected LOS: 9  Actual LOS: 8    Josesito Arzola, RN

## 2025-01-13 NOTE — PROGRESS NOTES
Cardiology Progress Note      1/13/2025 9:11 AM    Admit Date: 1/4/2025          Subjective: Events over weekend noted. Feeling better, less SOB          /82   Pulse (!) 118   Temp 97.3 °F (36.3 °C) (Oral)   Resp 18   Ht 1.854 m (6' 1\")   Wt (!) 165 kg (363 lb 12.1 oz)   SpO2 94%   BMI 47.99 kg/m²   01/11 1901 - 01/13 0700  In: 255 [P.O.:250; I.V.:5]  Out: 2550 [Urine:2550]        Objective:      Physical Exam:  VS as above  Chest CTA  Card Irreg irreg no gallop  Neuro awake and alert     Data Review:   Labs:      BUN 28  Creat 1.0  Na 132  K 3.2     Telemetry: afib R 100       Assessment:       HFpEF, RV dysfunction   JANA  Permanent Atrial Fibrillation s/p Afib ablation x 2 attempts  RBBB  Hypertension  Pulmonary Hypertension  Morbid Obesity  Obstructive Sleep Apnea  JANA   Anemia  Prostate Cancer History  Hypercarbic respiratory failure, likely due to obstructive sleep apnea, obesity hypoventilation    Plan:   Continue diuresis and weaning O2 . Seems to be slowly improving

## 2025-01-13 NOTE — PROGRESS NOTES
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 Will add hospital equivalent of his home Breo  He will definitely need a repeat sleep study and likely BiPAP titration after he recovers  Diuresis per cardiology/nephrology  Acetazolamide x 2 more doses per nephrology   His baseline serum bicarbonate is likely mid-30s  DVT prophylaxis    Will sign off. Will arrange outpatient follow up with our office. Please call with questions.      Subjective:      Patient seen this afternoon sitting up in chair. States his breathing is better. He walked around the halls today. Improved BLE edema. No cough, chest pain. He is satting 95% on 2L NC. Weaned to RA.     This patient has been seen and evaluated at the request of Dr. Fowler for suspected MELBA/OHS. Patient is a 69 y.o. male with complex medical history.  He was admitted a week ago with anasarca.  He had been on steroids for a very long time from his urologist as part of treatment for prostate cancer. During that time he gained 60+ pounds, going from the low-300s to the high 300's.  He has a history of atrial fibrillation with 2 failed ablations recently. He was diuresed here with subsequent JANA.  Eventually he developed AMS with hypercapnia and transiently required BiPAP.  He reports that he was diagnosed with MELBA by home sleep test several years ago but he was never on treatment as the insurance apparently would not cover his   Resp 18   Ht 1.854 m (6' 1\")   Wt (!) 165 kg (363 lb 12.1 oz)   SpO2 93%   BMI 47.99 kg/m²             Temp (24hrs), Av °F (36.7 °C), Min:97.3 °F (36.3 °C), Max:98.9 °F (37.2 °C)       Intake/Output:   Last shift:      701 - 1900  In: 600 [P.O.:600]  Out: -   Last 3 shifts: 1901 -  07  In: 255 [P.O.:250; I.V.:5]  Out: 2550 [Urine:2550]    Intake/Output Summary (Last 24 hours) at 2025 1324  Last data filed at 2025 1300  Gross per 24 hour   Intake 600 ml   Output 1500 ml   Net -900 ml      Physical Exam:   General:  Alert, cooperative, no distress, obese   Head:  Normocephalic, without obvious abnormality, atraumatic.                       Lungs:   Clear to auscultation bilaterally.       Heart:  Irregular, distant heart tones   Abdomen:   Soft, non-tender.     Extremities: 2-3+edema   Skin: Skin color, texture, turgor normal.       Neurologic: Grossly nonfocal     Data review:     Recent Results (from the past 24 hour(s))   Renal Function Panel    Collection Time: 25  4:07 AM   Result Value Ref Range    Sodium 133 (L) 136 - 145 mmol/L    Potassium 3.2 (L) 3.5 - 5.1 mmol/L    Chloride 91 (L) 97 - 108 mmol/L    CO2 37 (H) 21 - 32 mmol/L    Anion Gap 5 2 - 12 mmol/L    Glucose 101 (H) 65 - 100 mg/dL    BUN 28 (H) 6 - 20 MG/DL    Creatinine 1.01 0.70 - 1.30 MG/DL    BUN/Creatinine Ratio 28 (H) 12 - 20      Est, Glom Filt Rate 81 >60 ml/min/1.73m2    Calcium 9.7 8.5 - 10.1 MG/DL    Phosphorus 2.9 2.6 - 4.7 MG/DL    Albumin 3.6 3.5 - 5.0 g/dL   Magnesium    Collection Time: 25  4:07 AM   Result Value Ref Range    Magnesium 2.0 1.6 - 2.4 mg/dL   Basic Metabolic Panel    Collection Time: 25  4:08 AM   Result Value Ref Range    Sodium 132 (L) 136 - 145 mmol/L    Potassium 3.2 (L) 3.5 - 5.1 mmol/L    Chloride 91 (L) 97 - 108 mmol/L    CO2 38 (H) 21 - 32 mmol/L    Anion Gap 3 2 - 12 mmol/L    Glucose 104 (H) 65 - 100 mg/dL    BUN 28 (H) 6 - 20 MG/DL    Creatinine

## 2025-01-13 NOTE — PROGRESS NOTES
End of Shift Note    Bedside shift change report given to RN (oncoming nurse) by Kinjal Cedeño RN (offgoing nurse).  Report included the following information SBAR    Shift worked:  9923-9561, 1/13     Shift summary and any significant changes:     PT/OT visits today, ambulated in hallway. Weaned off O2 for majority of shift; put on 1L after exertion d/t desatting. , 4 episodes of loose stool.     Concerns for physician to address:  Coordinate placement with AMARIS MILLER levels     Zone phone for oncoming shift:   N/A         Kinjal Cedeño RN

## 2025-01-13 NOTE — PROGRESS NOTES
Nephrology Progress Note  [facility] [place] [facility]  [address], [address]  [place] [address]  Phone - [phone]  Fax - [phone]                 Patient: Piter Morales                     YOB: 1955        Date- 1/13/2025                                     Admit Date: 1/4/2025   CC: Follow up for JANA stage III          IMPRESSION & PLAN:   JANA stage 3( sec to JOSUE,Labile hemodynamics, BP as low as 71/50 mmhg)( CT neg for hydro)  Hypokalemia  HYPONATREMIA  Metabolic alkalosis  Severe volume overload  Anemia  Afib RVR  HTN  MELBA  Acute hypercarbic respiratory failure      PLAN-  Kcl 80 meq  Resume bumex  Give 2 more dose of acetazolemide  Follow bmp in am     Subjective:  Interval History:   Cr  improving, k low, no sob    Objective:   Vitals:    01/12/25 2219 01/13/25 0418 01/13/25 0715 01/13/25 0904   BP:  (!) 117/95 126/82 126/82   Pulse:   (!) 104 (!) 118   Resp: 18 20 18    Temp: 98.2 °F (36.8 °C) 98.9 °F (37.2 °C) 97.3 °F (36.3 °C)    TempSrc: Oral Oral Oral    SpO2:   94%    Weight:  (!) 165 kg (363 lb 12.1 oz)     Height:          I/O last 3 completed shifts:  In: 255 [P.O.:250; I.V.:5]  Out: 2550 [Urine:2550]  No intake/output data recorded.      Physical exam:    GEN:  NAD  NECK:  Supple, no thyromegaly  RESP:  no  wheezing, decreased bs b/l  NEURO: non focal, normal speech  EXT: Edema +nt     PSYCH: Normal mood  SKIN: No Rash      Chart reviewed.         Pertinent Notes reviewed.     Data Review :  Lab Results   Component Value Date/Time     01/13/2025 04:08 AM    K 3.2 01/13/2025 04:08 AM    CL 91 01/13/2025 04:08 AM    CO2 38 01/13/2025 04:08 AM    BUN 28 01/13/2025 04:08 AM    CREATININE 0.96 01/13/2025 04:08 AM    GLUCOSE 104 01/13/2025 04:08 AM    CALCIUM 9.5 01/13/2025 04:08 AM       Lab Results   Component Value Date    WBC 6.0 01/10/2025    HGB 10.5 (L) 01/10/2025    HCT 35.1 (L) 01/10/2025    MCV  01/10/2025    MCV 90.7 01/10/2025     01/10/2025      Recent Labs     01/12/25  0330 01/13/25  0407 01/13/25  0408    133* 132*   K 3.2* 3.2* 3.2*   CL 90* 91* 91*   CO2 42* 37* 38*   GLUCOSE 98 101* 104*   BUN 33* 28* 28*   CREATININE 1.04 1.01 0.96   CALCIUM 9.2 9.7 9.5       No results for input(s): \"WBC\", \"RBC\", \"HGB\", \"HCT\", \"MCV\", \"MCH\", \"MCHC\", \"RDW\", \"PLT\", \"MPV\" in the last 72 hours.    No results found for: \"IRON\", \"TIBC\"  No results found for: \"PTH\"  No results found for: \"LABA1C\"  Lab Results   Component Value Date/Time    COLORU DARK YELLOW 01/07/2025 12:15 PM    GLUCOSEU Negative 01/07/2025 12:15 PM    KETUA TRACE (A) 01/07/2025 12:15 PM    BLOODU Negative 01/07/2025 12:15 PM    PHUR 5.0 01/07/2025 12:15 PM    PROTEINU 100 (A) 01/07/2025 12:15 PM    NITRU Negative 01/07/2025 12:15 PM    LEUKOCYTESUR Negative 01/07/2025 12:15 PM     US Results (most recent):  Medication list  reviewed  Current Facility-Administered Medications   Medication Dose Route Frequency    bumetanide (BUMEX) tablet 1 mg  1 mg Oral BID    acetaZOLAMIDE (DIAMOX) tablet 500 mg  500 mg Oral Daily    midodrine (PROAMATINE) tablet 10 mg  10 mg Oral TID    ondansetron (ZOFRAN-ODT) disintegrating tablet 8 mg  8 mg Oral Q8H PRN    Or    ondansetron (ZOFRAN) injection 4 mg  4 mg IntraVENous Q6H PRN    albuterol sulfate HFA (PROVENTIL;VENTOLIN;PROAIR) 108 (90 Base) MCG/ACT inhaler 2 puff  2 puff Inhalation Q6H PRN    dermacerin (EUCERIN) cream   Topical BID    hydrOXYzine HCl (ATARAX) tablet 25 mg  25 mg Oral 4x Daily PRN    apixaban (ELIQUIS) tablet 5 mg  5 mg Oral BID    doxazosin (CARDURA) tablet 2 mg  2 mg Oral Daily    sodium chloride flush 0.9 % injection 5-40 mL  5-40 mL IntraVENous 2 times per day    sodium chloride flush 0.9 % injection 5-40 mL  5-40 mL IntraVENous PRN    0.9 % sodium chloride infusion   IntraVENous PRN    polyethylene glycol (GLYCOLAX) packet 17 g  17 g Oral Daily PRN    acetaminophen (TYLENOL) tablet

## 2025-01-13 NOTE — PLAN OF CARE
Problem: Physical Therapy - Adult  Goal: By Discharge: Performs mobility at highest level of function for planned discharge setting.  See evaluation for individualized goals.  Description: FUNCTIONAL STATUS PRIOR TO ADMISSION: Pt questionable historian though reports he was independent and active without use of DME.    HOME SUPPORT PRIOR TO ADMISSION: The patient lived with family but states he did not require assistance.    Physical Therapy Goals  Initiated 1/9/2025  1.  Patient will move from supine to sit and sit to supine in bed with independence within 7 day(s).    2.  Patient will perform sit to stand with modified independence within 7 day(s).  3.  Patient will transfer from bed to chair and chair to bed with modified independence using the least restrictive device within 7 day(s).  4.  Patient will ambulate with modified independence for 100 feet with the least restrictive device within 7 day(s).   5.  Patient will ascend/descend 5 stairs with handrail(s) with modified independence within 7 day(s).   Outcome: Progressing   PHYSICAL THERAPY TREATMENT    Patient: Piter Morales (69 y.o. male)  Date: 1/13/2025  Diagnosis: Anasarca [R60.1]  SOB (shortness of breath) [R06.02] Anasarca      Precautions: Fall Risk                      ASSESSMENT:  Patient continues to benefit from skilled PT services and is progressing towards goals. Pt received sitting in bedside chair, agreeable to participate in therapy. Received on 2L O2, saturating 94%, removed to RA, saturating 88% at rest, increasing to 92% with activity however post activity, desaturated to 86%, requiring addition of 1L O2 and pursed lip breathing, recovering to 96%. Fair tolerance to increased gait distance, minor unsteadiness noted however fatigued quickly with HR into the 130s. Pt continues to perform below baseline and would benefit from continued skilled PT services in order to promote full return to PLOF.        PLAN:  Patient continues to  apparent distress sitting up in chair, Call bell within reach, Bed/ chair alarm activated, and Updated patient's board on functional status and mobility recommendations      COMMUNICATION/EDUCATION:   The patient's plan of care was discussed with: registered nurse    Patient Education  Education Given To: Patient  Education Provided: Role of Therapy;Plan of Care;Transfer Training;Mobility Training;Fall Prevention Strategies;Energy Conservation  Education Method: Verbal;Demonstration  Barriers to Learning: None  Education Outcome: Verbalized understanding;Demonstrated understanding;Continued education needed      Anna Ozuna PTA  Minutes: 25

## 2025-01-14 LAB
ALBUMIN SERPL-MCNC: 4.1 G/DL (ref 3.5–5)
ANION GAP SERPL CALC-SCNC: 5 MMOL/L (ref 2–12)
BASOPHILS # BLD: 0.05 K/UL (ref 0–0.1)
BASOPHILS NFR BLD: 0.6 % (ref 0–1)
BUN SERPL-MCNC: 32 MG/DL (ref 6–20)
BUN/CREAT SERPL: 25 (ref 12–20)
CALCIUM SERPL-MCNC: 9.9 MG/DL (ref 8.5–10.1)
CHLORIDE SERPL-SCNC: 93 MMOL/L (ref 97–108)
CO2 SERPL-SCNC: 33 MMOL/L (ref 21–32)
CREAT SERPL-MCNC: 1.26 MG/DL (ref 0.7–1.3)
DIFFERENTIAL METHOD BLD: ABNORMAL
EOSINOPHIL # BLD: 1.49 K/UL (ref 0–0.4)
EOSINOPHIL NFR BLD: 18.2 % (ref 0–7)
ERYTHROCYTE [DISTWIDTH] IN BLOOD BY AUTOMATED COUNT: 15.3 % (ref 11.5–14.5)
GLUCOSE SERPL-MCNC: 112 MG/DL (ref 65–100)
HCT VFR BLD AUTO: 40.3 % (ref 36.6–50.3)
HGB BLD-MCNC: 12.5 G/DL (ref 12.1–17)
IMM GRANULOCYTES # BLD AUTO: 0.04 K/UL (ref 0–0.04)
IMM GRANULOCYTES NFR BLD AUTO: 0.5 % (ref 0–0.5)
LYMPHOCYTES # BLD: 1 K/UL (ref 0.8–3.5)
LYMPHOCYTES NFR BLD: 12.2 % (ref 12–49)
MCH RBC QN AUTO: 26.9 PG (ref 26–34)
MCHC RBC AUTO-ENTMCNC: 31 G/DL (ref 30–36.5)
MCV RBC AUTO: 86.7 FL (ref 80–99)
MONOCYTES # BLD: 0.72 K/UL (ref 0–1)
MONOCYTES NFR BLD: 8.8 % (ref 5–13)
NEUTS SEG # BLD: 4.9 K/UL (ref 1.8–8)
NEUTS SEG NFR BLD: 59.7 % (ref 32–75)
NRBC # BLD: 0 K/UL (ref 0–0.01)
NRBC BLD-RTO: 0 PER 100 WBC
PHOSPHATE SERPL-MCNC: 2.1 MG/DL (ref 2.6–4.7)
PLATELET # BLD AUTO: 304 K/UL (ref 150–400)
PMV BLD AUTO: 10.6 FL (ref 8.9–12.9)
POTASSIUM SERPL-SCNC: 3.7 MMOL/L (ref 3.5–5.1)
RBC # BLD AUTO: 4.65 M/UL (ref 4.1–5.7)
RBC MORPH BLD: ABNORMAL
SODIUM SERPL-SCNC: 131 MMOL/L (ref 136–145)
WBC # BLD AUTO: 8.2 K/UL (ref 4.1–11.1)

## 2025-01-14 PROCEDURE — 2060000000 HC ICU INTERMEDIATE R&B

## 2025-01-14 PROCEDURE — 94660 CPAP INITIATION&MGMT: CPT

## 2025-01-14 PROCEDURE — 36415 COLL VENOUS BLD VENIPUNCTURE: CPT

## 2025-01-14 PROCEDURE — 6370000000 HC RX 637 (ALT 250 FOR IP): Performed by: NURSE PRACTITIONER

## 2025-01-14 PROCEDURE — 80069 RENAL FUNCTION PANEL: CPT

## 2025-01-14 PROCEDURE — 2700000000 HC OXYGEN THERAPY PER DAY

## 2025-01-14 PROCEDURE — 97530 THERAPEUTIC ACTIVITIES: CPT

## 2025-01-14 PROCEDURE — 85025 COMPLETE CBC W/AUTO DIFF WBC: CPT

## 2025-01-14 PROCEDURE — 6370000000 HC RX 637 (ALT 250 FOR IP): Performed by: INTERNAL MEDICINE

## 2025-01-14 PROCEDURE — 2500000003 HC RX 250 WO HCPCS: Performed by: INTERNAL MEDICINE

## 2025-01-14 RX ORDER — DIPHENOXYLATE HYDROCHLORIDE AND ATROPINE SULFATE 2.5; .025 MG/1; MG/1
1 TABLET ORAL 4 TIMES DAILY PRN
Status: DISCONTINUED | OUTPATIENT
Start: 2025-01-14 | End: 2025-01-16 | Stop reason: HOSPADM

## 2025-01-14 RX ORDER — POTASSIUM CHLORIDE 1500 MG/1
40 TABLET, EXTENDED RELEASE ORAL 3 TIMES DAILY
Status: DISCONTINUED | OUTPATIENT
Start: 2025-01-14 | End: 2025-01-16

## 2025-01-14 RX ORDER — MIDODRINE HYDROCHLORIDE 5 MG/1
5 TABLET ORAL 3 TIMES DAILY
Status: DISCONTINUED | OUTPATIENT
Start: 2025-01-14 | End: 2025-01-14

## 2025-01-14 RX ADMIN — DOXAZOSIN 2 MG: 2 TABLET ORAL at 08:45

## 2025-01-14 RX ADMIN — ACETAZOLAMIDE 500 MG: 250 TABLET ORAL at 06:06

## 2025-01-14 RX ADMIN — Medication 1 CAPSULE: at 08:46

## 2025-01-14 RX ADMIN — DIPHENOXYLATE HYDROCHLORIDE AND ATROPINE SULFATE 1 TABLET: 2.5; .025 TABLET ORAL at 15:44

## 2025-01-14 RX ADMIN — DIPHENOXYLATE HYDROCHLORIDE AND ATROPINE SULFATE 1 TABLET: 2.5; .025 TABLET ORAL at 22:31

## 2025-01-14 RX ADMIN — POTASSIUM CHLORIDE 40 MEQ: 1500 TABLET, EXTENDED RELEASE ORAL at 20:04

## 2025-01-14 RX ADMIN — MIDODRINE HYDROCHLORIDE 10 MG: 5 TABLET ORAL at 08:46

## 2025-01-14 RX ADMIN — APIXABAN 5 MG: 5 TABLET, FILM COATED ORAL at 08:46

## 2025-01-14 RX ADMIN — APIXABAN 5 MG: 5 TABLET, FILM COATED ORAL at 20:04

## 2025-01-14 RX ADMIN — SKIN PROTECTANT: 33 OINTMENT TOPICAL at 20:05

## 2025-01-14 RX ADMIN — MIDODRINE HYDROCHLORIDE 5 MG: 5 TABLET ORAL at 14:32

## 2025-01-14 RX ADMIN — Medication 1 CAPSULE: at 02:47

## 2025-01-14 RX ADMIN — BUMETANIDE 1 MG: 1 TABLET ORAL at 08:46

## 2025-01-14 RX ADMIN — POTASSIUM CHLORIDE 40 MEQ: 1500 TABLET, EXTENDED RELEASE ORAL at 14:32

## 2025-01-14 RX ADMIN — SKIN PROTECTANT: 33 OINTMENT TOPICAL at 08:46

## 2025-01-14 RX ADMIN — METOPROLOL SUCCINATE 100 MG: 50 TABLET, EXTENDED RELEASE ORAL at 08:45

## 2025-01-14 RX ADMIN — SODIUM CHLORIDE, PRESERVATIVE FREE 10 ML: 5 INJECTION INTRAVENOUS at 08:47

## 2025-01-14 RX ADMIN — SODIUM CHLORIDE, PRESERVATIVE FREE 10 ML: 5 INJECTION INTRAVENOUS at 20:06

## 2025-01-14 RX ADMIN — BUMETANIDE 1 MG: 1 TABLET ORAL at 18:11

## 2025-01-14 ASSESSMENT — PAIN SCALES - GENERAL: PAINLEVEL_OUTOF10: 0

## 2025-01-14 NOTE — PROGRESS NOTES
Nursing contacted Nocturnist/cross cover provider via non-urgent messaging system [employer] and notified patient got miralax for constipation, states now since 1900 4 watery stools and pt asking for something for diarrhea, no acute complicating features reported. No other concerns reported. No acute distress reported. No other information provided by nurse. VSS.     Ordered probiotics daily start now. Will defer further evaluation/management to the day shift primary attending care team. Patient denies any further complaints or concerns.     Nursing to notify Hospitalist for further/continued concerns. Will remain available overnight for further concerns if nursing/patient needs. Please note, there are RRT systems in this hospital in place that if nursing has acute or critical patient condition change or concern, this is to help facilitate and notify that patient needs immediate bedside evaluation by a provider.     Non-billable note.

## 2025-01-14 NOTE — PROGRESS NOTES
Cardiology Progress Note      1/14/2025 9:16 AM    Admit Date: 1/4/2025          Subjective: Off O2. SOB better. Has diarrhea after being given laxative          BP (!) 141/89   Pulse 99   Temp 98.4 °F (36.9 °C) (Oral)   Resp 18   Ht 1.854 m (6' 1\")   Wt (!) 160.4 kg (353 lb 9.9 oz)   SpO2 95%   BMI 46.65 kg/m²   01/12 1901 - 01/14 0700  In: 600 [P.O.:600]  Out: 2030 [Urine:2025]        Objective:      Physical Exam:  VS as above  Chest CTA  Card Irreg irreg no gallop     Data Review:   Labs:    BUN 32  Creat 1.3   Na 131  Hgb 12.5      Telemetry: afib R        Assessment:     HFpEF, RV dysfunction   JANA- improved   Permanent Atrial Fibrillation s/p Afib ablation x 2 attempts  RBBB  Hypertension  Pulmonary Hypertension  Morbid Obesity  Obstructive Sleep Apnea  Anemia  Prostate Cancer History  Hypercarbic respiratory failure, likely due to obstructive sleep apnea, obesity hypoventilation- improved   Hyponatremia     Plan:  Cont current Rx. Adjust meds . Decrease midoodrine Looks close to d/c

## 2025-01-14 NOTE — PROGRESS NOTES
End of Shift Note    Bedside shift change report given to rn (oncoming nurse) by Louisa Martinez RN (offgoing nurse).  Report included the following information SBAR, MAR, and Recent Results    Shift worked:  700-1900     Shift summary and any significant changes:     10 episodes of diarrhea this shift. Given PRN imodium once at 1544. Can receive imodium QID. Patient concerned with taking too much Imodium and becoming constipated again.    Concerns for physician to address:       Zone phone for oncoming shift:          Activity:  Level of Assistance: Standby assist, set-up cues, supervision of patient - no hands on  Number times ambulated in hallways past shift: 1  Number of times OOB to chair past shift: 10    Cardiac:   Cardiac Monitoring: Yes      Cardiac Rhythm: Atrial fib    Access:  Current line(s): PIV     Genitourinary:   Urinary Status: Voiding    Respiratory:   O2 Device: None (Room air)  Chronic home O2 use?: NO  Incentive spirometer at bedside: NO    GI:  Last BM (including prior to admit): 01/13/25  Current diet:  DIET ONE TIME MESSAGE;  ADULT DIET; Regular; 1500 ml  Passing flatus: YES    Pain Management:   Patient states pain is manageable on current regimen: YES    Skin:  Cong Scale Score: 18  Interventions: Wound Offloading (Prevention Methods): Bed, pressure redistribution/air, Repositioning, Pillows, Turning    Patient Safety:  Fall Risk: Nursing Judgement-Fall Risk High(Add Comments): No  Fall Risk Interventions  Nursing Judgement-Fall Risk High(Add Comments): No  Toilet Every 2 Hours-In Advance of Need: Yes  Hourly Visual Checks: Awake, In bed  Fall Visual Posted: Socks, Fall sign posted  Room Door Open: Deferred to promote rest  Alarm On: Chair, Bed  Patient Moved Closer to Nursing Station: No    Active Consults:   IP CONSULT TO PHARMACY  IP CONSULT TO CARDIOLOGY  IP CONSULT TO NEPHROLOGY  IP CONSULT TO PULMONOLOGY    Length of Stay:  Expected LOS: 11  Actual LOS: 10    Louisa

## 2025-01-14 NOTE — PLAN OF CARE
Problem: Physical Therapy - Adult  Goal: By Discharge: Performs mobility at highest level of function for planned discharge setting.  See evaluation for individualized goals.  Description: FUNCTIONAL STATUS PRIOR TO ADMISSION: Pt questionable historian though reports he was independent and active without use of DME.    HOME SUPPORT PRIOR TO ADMISSION: The patient lived with family but states he did not require assistance.    Physical Therapy Goals  Initiated 1/9/2025  1.  Patient will move from supine to sit and sit to supine in bed with independence within 7 day(s).    2.  Patient will perform sit to stand with modified independence within 7 day(s).  3.  Patient will transfer from bed to chair and chair to bed with modified independence using the least restrictive device within 7 day(s).  4.  Patient will ambulate with modified independence for 100 feet with the least restrictive device within 7 day(s).   5.  Patient will ascend/descend 5 stairs with handrail(s) with modified independence within 7 day(s).   Outcome: Progressing     PHYSICAL THERAPY TREATMENT    Patient: Piter Morales (69 y.o. male)  Date: 1/14/2025  Diagnosis: Anasarca [R60.1]  SOB (shortness of breath) [R06.02] Anasarca      Precautions: Fall Risk                      ASSESSMENT:  Patient continues to benefit from skilled PT services and is progressing towards goals. Patient received in bed and agreeable to activity. Pt stood from EOB and engaged in ambulation in hallway with RW. PT demo steady gait with use of walker. Pt then trial use of no AD in bathroom. Pt demo good safety without device for short distances. Pt completed toileting with supervision. Pt directed in sitting in chair at end of session.          PLAN:  Patient continues to benefit from skilled intervention to address the above impairments.  Continue treatment per established plan of care.    Recommendations for staff mobility and toileting assistance:  Recommend toileting  Learning: None  Education Outcome: Verbalized understanding;Demonstrated understanding      Kinjal Gardner, PT  Minutes: 18

## 2025-01-14 NOTE — PROGRESS NOTES
304 01/14/2025      Recent Labs     01/13/25  0407 01/13/25  0408 01/14/25  0251   * 132* 131*   K 3.2* 3.2* 3.7   CL 91* 91* 93*   CO2 37* 38* 33*   GLUCOSE 101* 104* 112*   BUN 28* 28* 32*   CREATININE 1.01 0.96 1.26   CALCIUM 9.7 9.5 9.9       Recent Labs     01/14/25  0251   WBC 8.2   RBC 4.65   HGB 12.5   HCT 40.3   MCV 86.7   MCH 26.9   MCHC 31.0   RDW 15.3*      MPV 10.6       No results found for: \"IRON\", \"TIBC\"  No results found for: \"PTH\"  No results found for: \"LABA1C\"  Lab Results   Component Value Date/Time    COLORU DARK YELLOW 01/07/2025 12:15 PM    GLUCOSEU Negative 01/07/2025 12:15 PM    KETUA TRACE (A) 01/07/2025 12:15 PM    BLOODU Negative 01/07/2025 12:15 PM    PHUR 5.0 01/07/2025 12:15 PM    PROTEINU 100 (A) 01/07/2025 12:15 PM    NITRU Negative 01/07/2025 12:15 PM    LEUKOCYTESUR Negative 01/07/2025 12:15 PM     US Results (most recent):  Medication list  reviewed  Current Facility-Administered Medications   Medication Dose Route Frequency    potassium chloride (KLOR-CON M) extended release tablet 40 mEq  40 mEq Oral TID    midodrine (PROAMATINE) tablet 5 mg  5 mg Oral TID    diphenoxylate-atropine (LOMOTIL) 2.5-0.025 MG per tablet 1 tablet  1 tablet Oral 4x Daily PRN    lactobacillus (CULTURELLE) capsule 1 capsule  1 capsule Oral Daily with breakfast    bumetanide (BUMEX) tablet 1 mg  1 mg Oral BID    ondansetron (ZOFRAN-ODT) disintegrating tablet 8 mg  8 mg Oral Q8H PRN    Or    ondansetron (ZOFRAN) injection 4 mg  4 mg IntraVENous Q6H PRN    albuterol sulfate HFA (PROVENTIL;VENTOLIN;PROAIR) 108 (90 Base) MCG/ACT inhaler 2 puff  2 puff Inhalation Q6H PRN    dermacerin (EUCERIN) cream   Topical BID    hydrOXYzine HCl (ATARAX) tablet 25 mg  25 mg Oral 4x Daily PRN    apixaban (ELIQUIS) tablet 5 mg  5 mg Oral BID    doxazosin (CARDURA) tablet 2 mg  2 mg Oral Daily    sodium chloride flush 0.9 % injection 5-40 mL  5-40 mL IntraVENous 2 times per day    sodium chloride flush 0.9 %

## 2025-01-14 NOTE — PLAN OF CARE
Problem: Safety - Adult  Goal: Free from fall injury  Outcome: Progressing     Problem: Physical Therapy - Adult  Goal: By Discharge: Performs mobility at highest level of function for planned discharge setting.  See evaluation for individualized goals.  Description: FUNCTIONAL STATUS PRIOR TO ADMISSION: Pt questionable historian though reports he was independent and active without use of DME.    HOME SUPPORT PRIOR TO ADMISSION: The patient lived with family but states he did not require assistance.    Physical Therapy Goals  Initiated 1/9/2025  1.  Patient will move from supine to sit and sit to supine in bed with independence within 7 day(s).    2.  Patient will perform sit to stand with modified independence within 7 day(s).  3.  Patient will transfer from bed to chair and chair to bed with modified independence using the least restrictive device within 7 day(s).  4.  Patient will ambulate with modified independence for 100 feet with the least restrictive device within 7 day(s).   5.  Patient will ascend/descend 5 stairs with handrail(s) with modified independence within 7 day(s).   1/13/2025 1320 by Anna Ozuna PTA  Outcome: Progressing     Problem: Occupational Therapy - Adult  Goal: By Discharge: Performs self-care activities at highest level of function for planned discharge setting.  See evaluation for individualized goals.  Description: FUNCTIONAL STATUS PRIOR TO ADMISSION:  The patient is a questionable historian but reports being independent with ADLs and functional mobility at baseline. He owns AD (canes, RW) but states he is not currently using them.  HOME SUPPORT: The patient lives with his wife and daughter.    Occupational Therapy Goals:  Initiated 1/9/2025  1.  Patient will perform grooming with Set-up seated in chair within 7 day(s).  2.  Patient will perform upper body dressing with Set-up within 7 day(s).  3.  Patient will perform lower body dressing with Moderate Assist within 7 day(s).  4.

## 2025-01-15 LAB
ABO + RH BLD: NORMAL
ALBUMIN SERPL-MCNC: 4.3 G/DL (ref 3.5–5)
ANION GAP SERPL CALC-SCNC: 7 MMOL/L (ref 2–12)
BASOPHILS # BLD: 0.04 K/UL (ref 0–0.1)
BASOPHILS NFR BLD: 0.5 % (ref 0–1)
BLOOD GROUP ANTIBODIES SERPL: NORMAL
BUN SERPL-MCNC: 31 MG/DL (ref 6–20)
BUN/CREAT SERPL: 25 (ref 12–20)
CALCIUM SERPL-MCNC: 9.4 MG/DL (ref 8.5–10.1)
CHLORIDE SERPL-SCNC: 95 MMOL/L (ref 97–108)
CO2 SERPL-SCNC: 30 MMOL/L (ref 21–32)
CREAT SERPL-MCNC: 1.26 MG/DL (ref 0.7–1.3)
DIFFERENTIAL METHOD BLD: ABNORMAL
EKG DIAGNOSIS: NORMAL
EKG Q-T INTERVAL: 324 MS
EKG QRS DURATION: 148 MS
EKG QTC CALCULATION (BAZETT): 483 MS
EKG R AXIS: -10 DEGREES
EKG T AXIS: 37 DEGREES
EKG VENTRICULAR RATE: 134 BPM
EOSINOPHIL # BLD: 1.19 K/UL (ref 0–0.4)
EOSINOPHIL NFR BLD: 15 % (ref 0–7)
ERYTHROCYTE [DISTWIDTH] IN BLOOD BY AUTOMATED COUNT: 15.4 % (ref 11.5–14.5)
GLUCOSE SERPL-MCNC: 105 MG/DL (ref 65–100)
HCT VFR BLD AUTO: 40.9 % (ref 36.6–50.3)
HEMOCCULT STL QL: POSITIVE
HGB BLD-MCNC: 12.6 G/DL (ref 12.1–17)
IMM GRANULOCYTES # BLD AUTO: 0.04 K/UL (ref 0–0.04)
IMM GRANULOCYTES NFR BLD AUTO: 0.5 % (ref 0–0.5)
LYMPHOCYTES # BLD: 0.49 K/UL (ref 0.8–3.5)
LYMPHOCYTES NFR BLD: 6.2 % (ref 12–49)
MCH RBC QN AUTO: 26.9 PG (ref 26–34)
MCHC RBC AUTO-ENTMCNC: 30.8 G/DL (ref 30–36.5)
MCV RBC AUTO: 87.2 FL (ref 80–99)
MONOCYTES # BLD: 0.24 K/UL (ref 0–1)
MONOCYTES NFR BLD: 3.1 % (ref 5–13)
NEUTS SEG # BLD: 5.9 K/UL (ref 1.8–8)
NEUTS SEG NFR BLD: 74.7 % (ref 32–75)
NRBC # BLD: 0 K/UL (ref 0–0.01)
NRBC BLD-RTO: 0 PER 100 WBC
PHOSPHATE SERPL-MCNC: 2.2 MG/DL (ref 2.6–4.7)
PLATELET # BLD AUTO: 268 K/UL (ref 150–400)
PMV BLD AUTO: 10.3 FL (ref 8.9–12.9)
POTASSIUM SERPL-SCNC: 3.2 MMOL/L (ref 3.5–5.1)
RBC # BLD AUTO: 4.69 M/UL (ref 4.1–5.7)
RBC MORPH BLD: ABNORMAL
SODIUM SERPL-SCNC: 132 MMOL/L (ref 136–145)
SPECIMEN EXP DATE BLD: NORMAL
WBC # BLD AUTO: 7.9 K/UL (ref 4.1–11.1)

## 2025-01-15 PROCEDURE — 2500000003 HC RX 250 WO HCPCS: Performed by: NURSE PRACTITIONER

## 2025-01-15 PROCEDURE — 6370000000 HC RX 637 (ALT 250 FOR IP): Performed by: INTERNAL MEDICINE

## 2025-01-15 PROCEDURE — 85025 COMPLETE CBC W/AUTO DIFF WBC: CPT

## 2025-01-15 PROCEDURE — 97535 SELF CARE MNGMENT TRAINING: CPT

## 2025-01-15 PROCEDURE — 2060000000 HC ICU INTERMEDIATE R&B

## 2025-01-15 PROCEDURE — 2500000003 HC RX 250 WO HCPCS: Performed by: INTERNAL MEDICINE

## 2025-01-15 PROCEDURE — 36415 COLL VENOUS BLD VENIPUNCTURE: CPT

## 2025-01-15 PROCEDURE — 86901 BLOOD TYPING SEROLOGIC RH(D): CPT

## 2025-01-15 PROCEDURE — 6370000000 HC RX 637 (ALT 250 FOR IP): Performed by: NURSE PRACTITIONER

## 2025-01-15 PROCEDURE — 80069 RENAL FUNCTION PANEL: CPT

## 2025-01-15 PROCEDURE — 86900 BLOOD TYPING SEROLOGIC ABO: CPT

## 2025-01-15 PROCEDURE — 82272 OCCULT BLD FECES 1-3 TESTS: CPT

## 2025-01-15 PROCEDURE — 86850 RBC ANTIBODY SCREEN: CPT

## 2025-01-15 PROCEDURE — 6360000002 HC RX W HCPCS: Performed by: NURSE PRACTITIONER

## 2025-01-15 RX ORDER — POTASSIUM CHLORIDE 7.45 MG/ML
10 INJECTION INTRAVENOUS ONCE
Status: COMPLETED | OUTPATIENT
Start: 2025-01-15 | End: 2025-01-15

## 2025-01-15 RX ORDER — DIGOXIN 125 MCG
250 TABLET ORAL DAILY
Status: DISCONTINUED | OUTPATIENT
Start: 2025-01-15 | End: 2025-01-16

## 2025-01-15 RX ORDER — METOPROLOL TARTRATE 1 MG/ML
5 INJECTION, SOLUTION INTRAVENOUS ONCE
Status: COMPLETED | OUTPATIENT
Start: 2025-01-15 | End: 2025-01-15

## 2025-01-15 RX ADMIN — METOPROLOL TARTRATE 5 MG: 5 INJECTION INTRAVENOUS at 05:52

## 2025-01-15 RX ADMIN — APIXABAN 5 MG: 5 TABLET, FILM COATED ORAL at 08:51

## 2025-01-15 RX ADMIN — DIPHENOXYLATE HYDROCHLORIDE AND ATROPINE SULFATE 1 TABLET: 2.5; .025 TABLET ORAL at 08:51

## 2025-01-15 RX ADMIN — DIGOXIN 250 MCG: 125 TABLET ORAL at 12:12

## 2025-01-15 RX ADMIN — METOPROLOL SUCCINATE 100 MG: 50 TABLET, EXTENDED RELEASE ORAL at 08:51

## 2025-01-15 RX ADMIN — SKIN PROTECTANT: 33 OINTMENT TOPICAL at 08:51

## 2025-01-15 RX ADMIN — POTASSIUM CHLORIDE 40 MEQ: 1500 TABLET, EXTENDED RELEASE ORAL at 13:28

## 2025-01-15 RX ADMIN — SKIN PROTECTANT: 33 OINTMENT TOPICAL at 21:28

## 2025-01-15 RX ADMIN — DOXAZOSIN 2 MG: 2 TABLET ORAL at 08:51

## 2025-01-15 RX ADMIN — POTASSIUM CHLORIDE 40 MEQ: 1500 TABLET, EXTENDED RELEASE ORAL at 08:51

## 2025-01-15 RX ADMIN — SODIUM CHLORIDE, PRESERVATIVE FREE 10 ML: 5 INJECTION INTRAVENOUS at 21:28

## 2025-01-15 RX ADMIN — SODIUM CHLORIDE, PRESERVATIVE FREE 10 ML: 5 INJECTION INTRAVENOUS at 08:50

## 2025-01-15 RX ADMIN — Medication 1 CAPSULE: at 08:51

## 2025-01-15 RX ADMIN — POTASSIUM CHLORIDE 40 MEQ: 1500 TABLET, EXTENDED RELEASE ORAL at 21:27

## 2025-01-15 RX ADMIN — POTASSIUM CHLORIDE 10 MEQ: 7.45 INJECTION INTRAVENOUS at 06:01

## 2025-01-15 RX ADMIN — APIXABAN 5 MG: 5 TABLET, FILM COATED ORAL at 21:27

## 2025-01-15 ASSESSMENT — PAIN SCALES - GENERAL
PAINLEVEL_OUTOF10: 0

## 2025-01-15 NOTE — PLAN OF CARE
Problem: Occupational Therapy - Adult  Goal: By Discharge: Performs self-care activities at highest level of function for planned discharge .setting.  See evaluation for individualized goals.  Description: FUNCTIONAL STATUS PRIOR TO ADMISSION:  The patient is a questionable historian but reports being independent with ADLs and functional mobility at baseline. He owns AD (canes, RW) but states he is not currently using them.  HOME SUPPORT: The patient lives with his wife and daughter.    Occupational Therapy Goals:  Initiated 1/9/2025  1.  Patient will perform grooming with Set-up seated in chair within 7 day(s).  2.  Patient will perform upper body dressing with Set-up within 7 day(s).  3.  Patient will perform lower body dressing with Moderate Assist within 7 day(s).  4.  Patient will perform toilet transfers with Minimal Assist  within 7 day(s).  5.  Patient will perform all aspects of toileting with Moderate Assist within 7 day(s).  6.  Patient will participate in upper extremity therapeutic exercise/activities with Supervision for 5 minutes within 7 day(s).      Outcome: Progressing   OCCUPATIONAL THERAPY TREATMENT  Patient: Piter Morales (69 y.o. male)  Date: 1/15/2025  Primary Diagnosis: Anasarca [R60.1]  SOB (shortness of breath) [R06.02]       Precautions: Fall Risk                Chart, occupational therapy assessment, plan of care, and goals were reviewed.    ASSESSMENT  Patient continues to benefit from skilled OT services and is progressing towards goals. Pt tolerated OT session well. Continues to demonstrate improvement with standing balance, standing tolerance, and activity tolerance. SpO2 remained >90% on RA, tachy up to 136 post functional tasks and mobility. Anticipate continued progress towards baseline.              PLAN :  Patient continues to benefit from skilled intervention to address the above impairments.  Continue treatment per established plan of care to address goals.    Recommend

## 2025-01-15 NOTE — PROGRESS NOTES
Hospitalist Progress Note    NAME:   Piter Morales   : 1955   MRN: [id number]     Date/Time: 1/15/2025 12:31 AM  Patient PCP: Jenni Ortiz MD    Estimated discharge date:  Barriers:       Assessment / Plan:  On 1/15  Developed multiple loose bowel movements, possible viral versus medication side effect, started on Lomotil as needed, BMP tomorrow, for now manage conservatively  Hold Bumex for now    Respiratory acidosis/metabolic alkalosis with altered mental status 1/10  - pH of 7.31, previous CO2 of 113, pO2 of 81 and bicarb of 57 consistent with respiratory acidosis and metabolic alkalosis (most likely secondary to hypoventilation syndrome as well as contraction alkalosis due to Bumex  - responded to  BiPAP  -Continue BiPAP overnight  - ABG today improvement    Rapid response was called around 6 PM on 01/10/2021  - Because the patient became unresponsive, 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 and he fell asleep we asked for pulmonary consult      Acute kidney injury  - Oliguric kidney injury, creatinine jumped to 3.09  - Possible contrast-induced nephropathy, drug reaction, ATN due to hypotension  - CT of abdomen and pelvis was done to evaluate for source of anasarca, given patient has no evidence of right or left heart failure on exam  - Consulted with nephrologist, started on Bumex drip, transferred to intermediate care unit, started on IV albumin  -- Resolved    New onset lower extremity/scrotal/abdominal edema possibly secondary to CHF exacerbation triggered by A-fib  -Started on Bumex drip per consulting nephrologist  -Significant improvement of lower extremity edema , scrotal edema and abdominal wall edema  - Echocardiogram during this admission showed preserved ejection fraction no significant valvular abnormality, as per cardiology note patient diagnosed with diastolic heart failure RV dysfunction and permanent A-fib status post ablation x 2, also patient has a history

## 2025-01-15 NOTE — PROGRESS NOTES
Comprehensive Nutrition Assessment    Type and Reason for Visit:  Initial, LOS    Nutrition Recommendations/Plan:   Clear liquid diet, advance as tolerated. 1500mL fluid restriction per MD.  Ensure clear TID until diet advances  Please document % meals and supplements consumed in flowsheet I/O's under intake   Replete electrolytes     Malnutrition Assessment:  Malnutrition Status:  At risk for malnutrition (01/15/25 1633)    Context:  Acute Illness     Findings of the 6 clinical characteristics of malnutrition:  Energy Intake:  Mild decrease in energy intake  Weight Loss:  Unable to assess     Body Fat Loss:  Unable to assess     Muscle Mass Loss:  Unable to assess    Fluid Accumulation:  Moderate to Severe Extremities, Generalized   Strength:  Not Performed    Nutrition Assessment:     Chart reviewed for LOS. Pt noted for JANA, possible CHF exacerbation, AMS, severe pruritus, afib, HTN, hx prostate cancer. Pt resting peacefully when RD attempted to visit today. Good intake documented from clear liquid diet. Diarrhea control remains an issue. Will continue monitoring.     Patient Vitals for the past 120 hrs:   PO Meals Eaten (%)   01/15/25 1328 76 - 100%   01/15/25 0951 76 - 100%   01/13/25 1300 26 - 50%   01/13/25 0900 26 - 50%   01/11/25 1800 51 - 75%   01/11/25 1415 51 - 75%   01/11/25 1019 51 - 75%   01/11/25 0900 76 - 100%     Wt Readings from Last 5 Encounters:   01/14/25 (!) 158.1 kg (348 lb 8.8 oz)   10/30/23 (!) 162.8 kg (359 lb)   07/28/23 (!) 160.6 kg (354 lb)   02/08/23 (!) 154.2 kg (340 lb)   ]    Nutrition Related Findings:    Labs: Na 132, K 3.2, Phos 2.2.   Meds: Culturelle, Klor-con, Lomotil.   Edema: generalized anasarca, trace BUE & sacral, +2 BLE.   BM 1/14.   Wound Type: None       Current Nutrition Intake & Therapies:    Average Meal Intake: %  Average Supplements Intake: None Ordered  ADULT DIET; Clear Liquid; 1500 ml  ADULT ORAL NUTRITION SUPPLEMENT; Breakfast, Lunch, Dinner; Clear

## 2025-01-15 NOTE — PROGRESS NOTES
End of Shift Note    Bedside shift change report given to Nhi SOLOMON (oncoming nurse) by MATIAS SMALLWOOD RN (offgoing nurse).  Report included the following information SBAR    Shift worked:  7am-7pm     Shift summary and any significant changes:     Sitting up in chair most of the shift working with OT walk round the room without c/o SOB . Cardiology add digoxin HR has been controled. No diarrhea  for this shift      Concerns for physician to address:  none     Zone phone for oncoming shift:          Activity:  Level of Assistance: Standby assist, set-up cues, supervision of patient - no hands on  Number times ambulated in hallways past shift: 0  Number of times OOB to chair past shift: 1    Cardiac:   Cardiac Monitoring: Yes      Cardiac Rhythm: Atrial fib    Access:  Current line(s): PIV     Genitourinary:   Urinary Status: Voiding    Respiratory:   O2 Device: None (Room air)  Chronic home O2 use?: NO  Incentive spirometer at bedside: NO    GI:  Last BM (including prior to admit): 01/14/25  Current diet:  ADULT DIET; Clear Liquid; 1500 ml  ADULT ORAL NUTRITION SUPPLEMENT; Breakfast, Lunch, Dinner; Clear Liquid Oral Supplement  Passing flatus: YES    Pain Management:   Patient states pain is manageable on current regimen: YES    Skin:  Cong Scale Score: 19  Interventions: Wound Offloading (Prevention Methods): Pillows, Repositioning    Patient Safety:  Fall Risk: Nursing Judgement-Fall Risk High(Add Comments): No  Fall Risk Interventions  Nursing Judgement-Fall Risk High(Add Comments): No  Toilet Every 2 Hours-In Advance of Need: Yes  Hourly Visual Checks: Awake  Fall Visual Posted: Armband  Room Door Open: Deferred to promote rest  Alarm On: Bed, Chair  Patient Moved Closer to Nursing Station: No    Active Consults:   IP CONSULT TO PHARMACY  IP CONSULT TO CARDIOLOGY  IP CONSULT TO NEPHROLOGY  IP CONSULT TO PULMONOLOGY    Length of Stay:  Expected LOS: 12  Actual LOS: 11    MATIAS SMALLWOOD,

## 2025-01-15 NOTE — PROGRESS NOTES
End of Shift Note    Bedside shift change report given to Ti (oncoming nurse) by Nhi Camargo RN (offgoing nurse).  Report included the following information SBAR    Shift worked:  [id number]     Shift summary and any significant changes:    Pt had about 8 loose stools during night. Pt states one of his bowel movements filled the toilet with blood. Pt flushed before RN could see it. Pt states he does have internal hemorrhoids. JACKIE Martinez notified. Orders received for stat CBC, stool for occult blood, and clear liquid diet, type and screen. Labs drawn and sent to lab.    Hemoglobin came back at 12.6. Stool for occult blood sent to lab.    Pt's HR 120s-140's, afib. (Pt with chronic afib). Asymptomatic. JACKIE Martinez notified. EKG done at bedside. Lopressor 5 mg IV given per her order.    Potassium level 3.2 this morning. 1 run of potassium chloride IV given per 's order.     Concerns for physician to address:       Zone phone for oncoming shift:          Nhi Camargo, RN

## 2025-01-15 NOTE — PROGRESS NOTES
Nursing contacted Nocturnist/cross cover provider via non-urgent messaging system [employer] and notified patient states having diarrhea multiple episodes x1 day, has imodium already ordered per nurse reported pt states last 2 bm pt had blood in the toilet he reported to nursing but didn't witness, he tells nurse he has known internal hemorrhoids, no c/o rectal pain, no abd pain, no acute abd complaints, unable to quantify the blood as was not visualized by nursing, vs stable as recently documented able to view. No other concerns reported. No acute distress reported. No other information provided by nurse.    Ordered cbc, t/c- last hgb was wnl and stable. Consider hemorrhoid cream for discomfort overnight should pt want, presently not asking for this. Consider serial cbc if needed for continued bld to re-assess, if no further issues with bld overnight would defer to dayshijefry md further eval/mgmt. Pt was on diet with 1500ml FR, placed on clear liquids w/same FR- defer to dayshijefry md timing to resume diet. FOBT ordered- pending. Consider consult GI -defer timing to daysamado md. Will defer further evaluation/management to the day shift primary attending care team. Patient denies any further complaints or concerns.     Nursing to notify Hospitalist for further/continued concerns. Will remain available overnight for further concerns if nursing/patient needs. Please note, there are RRT systems in this hospital in place that if nursing has acute or critical patient condition change or concern, this is to help facilitate and notify that patient needs immediate bedside evaluation by a provider.     Update  0458 hgb returned 12.6, states h/o afib, occasional rvr- presently having afib rvr on telemetry monitor, asymptomatic, rate 120-130s sustaining, not due metoprolol until 0900. Kcl noted to be 3.2 on am labs. Ordered kcl 10meq iv x1, stat EKG- pending, lopressor 5mg iv x1. No other concerns reported. NAD reported. Vss

## 2025-01-15 NOTE — PROGRESS NOTES
01/15/2025     01/15/2025      Recent Labs     01/13/25  0408 01/14/25  0251 01/15/25  0011   * 131* 132*   K 3.2* 3.7 3.2*   CL 91* 93* 95*   CO2 38* 33* 30   GLUCOSE 104* 112* 105*   BUN 28* 32* 31*   CREATININE 0.96 1.26 1.26   CALCIUM 9.5 9.9 9.4       Recent Labs     01/14/25  0251 01/15/25  0011   WBC 8.2 7.9   RBC 4.65 4.69   HGB 12.5 12.6   HCT 40.3 40.9   MCV 86.7 87.2   MCH 26.9 26.9   MCHC 31.0 30.8   RDW 15.3* 15.4*    268   MPV 10.6 10.3       No results found for: \"IRON\", \"TIBC\"  No results found for: \"PTH\"  No results found for: \"LABA1C\"  Lab Results   Component Value Date/Time    COLORU DARK YELLOW 01/07/2025 12:15 PM    GLUCOSEU Negative 01/07/2025 12:15 PM    KETUA TRACE (A) 01/07/2025 12:15 PM    BLOODU Negative 01/07/2025 12:15 PM    PHUR 5.0 01/07/2025 12:15 PM    PROTEINU 100 (A) 01/07/2025 12:15 PM    NITRU Negative 01/07/2025 12:15 PM    LEUKOCYTESUR Negative 01/07/2025 12:15 PM     US Results (most recent):  Medication list  reviewed  Current Facility-Administered Medications   Medication Dose Route Frequency    potassium chloride (KLOR-CON M) extended release tablet 40 mEq  40 mEq Oral TID    diphenoxylate-atropine (LOMOTIL) 2.5-0.025 MG per tablet 1 tablet  1 tablet Oral 4x Daily PRN    lactobacillus (CULTURELLE) capsule 1 capsule  1 capsule Oral Daily with breakfast    [Held by provider] bumetanide (BUMEX) tablet 1 mg  1 mg Oral BID    ondansetron (ZOFRAN-ODT) disintegrating tablet 8 mg  8 mg Oral Q8H PRN    Or    ondansetron (ZOFRAN) injection 4 mg  4 mg IntraVENous Q6H PRN    albuterol sulfate HFA (PROVENTIL;VENTOLIN;PROAIR) 108 (90 Base) MCG/ACT inhaler 2 puff  2 puff Inhalation Q6H PRN    dermacerin (EUCERIN) cream   Topical BID    hydrOXYzine HCl (ATARAX) tablet 25 mg  25 mg Oral 4x Daily PRN    apixaban (ELIQUIS) tablet 5 mg  5 mg Oral BID    doxazosin (CARDURA) tablet 2 mg  2 mg Oral Daily    sodium chloride flush 0.9 % injection 5-40 mL  5-40 mL IntraVENous 2  times per day    sodium chloride flush 0.9 % injection 5-40 mL  5-40 mL IntraVENous PRN    0.9 % sodium chloride infusion   IntraVENous PRN    polyethylene glycol (GLYCOLAX) packet 17 g  17 g Oral Daily PRN    acetaminophen (TYLENOL) tablet 650 mg  650 mg Oral Q6H PRN    Or    acetaminophen (TYLENOL) suppository 650 mg  650 mg Rectal Q6H PRN    metoprolol succinate (TOPROL XL) extended release tablet 100 mg  100 mg Oral Daily        Ward Santiago MD  1/15/2025

## 2025-01-15 NOTE — PLAN OF CARE
Problem: Safety - Adult  Goal: Free from fall injury  Outcome: Progressing     Problem: Skin/Tissue Integrity - Adult  Goal: Skin integrity remains intact  Outcome: Progressing  Flowsheets (Taken 1/15/2025 5078)  Skin Integrity Remains Intact: Monitor for areas of redness and/or skin breakdown

## 2025-01-15 NOTE — PLAN OF CARE
Problem: Safety - Adult  Goal: Free from fall injury  Outcome: Progressing     Problem: Physical Therapy - Adult  Goal: By Discharge: Performs mobility at highest level of function for planned discharge setting.  See evaluation for individualized goals.  Description: FUNCTIONAL STATUS PRIOR TO ADMISSION: Pt questionable historian though reports he was independent and active without use of DME.    HOME SUPPORT PRIOR TO ADMISSION: The patient lived with family but states he did not require assistance.    Physical Therapy Goals  Initiated 1/9/2025  1.  Patient will move from supine to sit and sit to supine in bed with independence within 7 day(s).    2.  Patient will perform sit to stand with modified independence within 7 day(s).  3.  Patient will transfer from bed to chair and chair to bed with modified independence using the least restrictive device within 7 day(s).  4.  Patient will ambulate with modified independence for 100 feet with the least restrictive device within 7 day(s).   5.  Patient will ascend/descend 5 stairs with handrail(s) with modified independence within 7 day(s).   1/14/2025 1508 by Kinjal Gardner, PT  Outcome: Progressing

## 2025-01-15 NOTE — PROGRESS NOTES
Cardiology Progress Note      1/15/2025 11:05 AM    Admit Date: 1/4/2025          Subjective: Feeling better. Loose stools resolved          /89   Pulse (!) 136   Temp 98.1 °F (36.7 °C) (Oral)   Resp 20   Ht 1.854 m (6' 1\")   Wt (!) 158.1 kg (348 lb 8.8 oz)   SpO2 (!) 89%   BMI 45.99 kg/m²   01/13 1901 - 01/15 0700  In: 720 [P.O.:720]  Out: 1890 [Urine:1875]        Objective:      Physical Exam:  VS as above   Chest CTA  Card Irreg irreg no gallop     Data Review:   Labs:    BUN 31  Creat 1.3   Na 132  Hgb 12.6     Telemetry: afib R 100       Assessment:           HFpEF, RV dysfunction   JANA- improved   Permanent Atrial Fibrillation s/p Afib ablation x 2 attempts  RBBB  Hypertension  Pulmonary Hypertension  Morbid Obesity  Obstructive Sleep Apnea  Anemia  Prostate Cancer History  Hypercarbic respiratory failure, likely due to obstructive sleep apnea, obesity hypoventilation- improved   Hyponatremia     Plan: Add digoxin to enhace rate control . Cont other current Rx. Check dig level 1-2 weeks

## 2025-01-16 VITALS
BODY MASS INDEX: 41.75 KG/M2 | RESPIRATION RATE: 18 BRPM | HEIGHT: 73 IN | WEIGHT: 315 LBS | TEMPERATURE: 98.3 F | OXYGEN SATURATION: 95 % | SYSTOLIC BLOOD PRESSURE: 131 MMHG | HEART RATE: 97 BPM | DIASTOLIC BLOOD PRESSURE: 77 MMHG

## 2025-01-16 LAB
ALBUMIN SERPL-MCNC: 3.9 G/DL (ref 3.5–5)
ANION GAP SERPL CALC-SCNC: 4 MMOL/L (ref 2–12)
BUN SERPL-MCNC: 28 MG/DL (ref 6–20)
BUN/CREAT SERPL: 25 (ref 12–20)
CALCIUM SERPL-MCNC: 9.5 MG/DL (ref 8.5–10.1)
CHLORIDE SERPL-SCNC: 98 MMOL/L (ref 97–108)
CO2 SERPL-SCNC: 30 MMOL/L (ref 21–32)
CREAT SERPL-MCNC: 1.13 MG/DL (ref 0.7–1.3)
GLUCOSE SERPL-MCNC: 102 MG/DL (ref 65–100)
PHOSPHATE SERPL-MCNC: 2.9 MG/DL (ref 2.6–4.7)
POTASSIUM SERPL-SCNC: 4 MMOL/L (ref 3.5–5.1)
SODIUM SERPL-SCNC: 132 MMOL/L (ref 136–145)

## 2025-01-16 PROCEDURE — 6370000000 HC RX 637 (ALT 250 FOR IP): Performed by: INTERNAL MEDICINE

## 2025-01-16 PROCEDURE — 36415 COLL VENOUS BLD VENIPUNCTURE: CPT

## 2025-01-16 PROCEDURE — 2500000003 HC RX 250 WO HCPCS: Performed by: INTERNAL MEDICINE

## 2025-01-16 PROCEDURE — 80069 RENAL FUNCTION PANEL: CPT

## 2025-01-16 PROCEDURE — 6370000000 HC RX 637 (ALT 250 FOR IP): Performed by: NURSE PRACTITIONER

## 2025-01-16 RX ORDER — DIGOXIN 125 MCG
125 TABLET ORAL DAILY
Status: DISCONTINUED | OUTPATIENT
Start: 2025-01-17 | End: 2025-01-16 | Stop reason: HOSPADM

## 2025-01-16 RX ORDER — LACTOBACILLUS RHAMNOSUS GG 10B CELL
1 CAPSULE ORAL
Qty: 30 CAPSULE | Refills: 0 | Status: SHIPPED
Start: 2025-01-17

## 2025-01-16 RX ORDER — MINERAL OIL/PETROLATUM,WHITE
1 CREAM (GRAM) TOPICAL 2 TIMES DAILY
Qty: 1 EACH | Refills: 0 | Status: SHIPPED
Start: 2025-01-16

## 2025-01-16 RX ORDER — POTASSIUM CHLORIDE 1500 MG/1
40 TABLET, EXTENDED RELEASE ORAL DAILY
Status: DISCONTINUED | OUTPATIENT
Start: 2025-01-17 | End: 2025-01-16 | Stop reason: HOSPADM

## 2025-01-16 RX ORDER — BUMETANIDE 1 MG/1
1 TABLET ORAL 2 TIMES DAILY
Qty: 30 TABLET | Refills: 3 | Status: SHIPPED
Start: 2025-01-16

## 2025-01-16 RX ORDER — DOXAZOSIN 2 MG/1
2 TABLET ORAL DAILY
Qty: 30 TABLET | Refills: 3 | Status: SHIPPED | OUTPATIENT
Start: 2025-01-16

## 2025-01-16 RX ORDER — DIGOXIN 125 MCG
125 TABLET ORAL DAILY
Qty: 30 TABLET | Refills: 3 | Status: SHIPPED
Start: 2025-01-17

## 2025-01-16 RX ADMIN — DOXAZOSIN 2 MG: 2 TABLET ORAL at 09:17

## 2025-01-16 RX ADMIN — METOPROLOL SUCCINATE 100 MG: 50 TABLET, EXTENDED RELEASE ORAL at 09:17

## 2025-01-16 RX ADMIN — SKIN PROTECTANT: 33 OINTMENT TOPICAL at 09:17

## 2025-01-16 RX ADMIN — DIGOXIN 250 MCG: 125 TABLET ORAL at 09:17

## 2025-01-16 RX ADMIN — Medication 1 CAPSULE: at 09:17

## 2025-01-16 RX ADMIN — POTASSIUM CHLORIDE 40 MEQ: 1500 TABLET, EXTENDED RELEASE ORAL at 09:17

## 2025-01-16 RX ADMIN — APIXABAN 5 MG: 5 TABLET, FILM COATED ORAL at 09:17

## 2025-01-16 RX ADMIN — SODIUM CHLORIDE, PRESERVATIVE FREE 10 ML: 5 INJECTION INTRAVENOUS at 09:17

## 2025-01-16 ASSESSMENT — PAIN SCALES - GENERAL
PAINLEVEL_OUTOF10: 0
PAINLEVEL_OUTOF10: 0

## 2025-01-16 NOTE — PROGRESS NOTES
Cardiology Progress Note      1/16/2025 3:22 PM    Admit Date: 1/4/2025          Subjective:  D/c plans to [facility] noted. Currently stable          /77   Pulse 97   Temp 98.3 °F (36.8 °C) (Oral)   Resp 18   Ht 1.854 m (6' 1\")   Wt (!) 157.6 kg (347 lb 7.1 oz)   SpO2 95%   BMI 45.84 kg/m²   01/14 1901 - 01/16 0700  In: 520 [P.O.:520]  Out: 2020 [Urine:2020]        Objective:      Physical Exam:  VS as above     Data Review:   Labs:    BUN 28  Creat 1.1   Na 132      Assessment:     HFpEF, RV dysfunction   JANA- improved   Permanent Atrial Fibrillation s/p Afib ablation x 2 attempts  RBBB  Hypertension  Pulmonary Hypertension  Morbid Obesity  Obstructive Sleep Apnea  Anemia  Prostate Cancer History  Hypercarbic respiratory failure, likely due to obstructive sleep apnea, obesity hypoventilation- improved   Hyponatremia     Plan: Needs digoxin level in 1-2 weeks. F/u with me 4-6 weeks

## 2025-01-16 NOTE — PLAN OF CARE
Problem: Safety - Adult  Goal: Free from fall injury  1/15/2025 2000 by Nhi Chakraborty RN  Outcome: Progressing     Problem: Skin/Tissue Integrity - Adult  Goal: Skin integrity remains intact  Outcome: Progressing  Flowsheets (Taken 1/16/2025 0819)  Skin Integrity Remains Intact: Monitor for areas of redness and/or skin breakdown     Problem: Respiratory - Adult  Goal: Achieves optimal ventilation and oxygenation  Outcome: Progressing  Flowsheets (Taken 1/16/2025 0819)  Achieves optimal ventilation and oxygenation: Assess for changes in respiratory status     Problem: Musculoskeletal - Adult  Goal: Return mobility to safest level of function  Outcome: Progressing  Flowsheets (Taken 1/16/2025 0819)  Return Mobility to Safest Level of Function: Assess patient stability and activity tolerance for standing, transferring and ambulating with or without assistive devices

## 2025-01-16 NOTE — PROGRESS NOTES
Patient discharge to [facility]. Report given to the nurse and IV line removed pressure dressing applied . Discharge instruction sent with [facility] .

## 2025-01-16 NOTE — PROGRESS NOTES
Spiritual Care Partner Volunteer visited patient at [facility] in [facility] CARDIOPULMONARY CARE on 1/16/2025   Documented by:  Raina Singer MPS, BCC, Staff   [facility]     Paging Service [phone] ([phone])

## 2025-01-16 NOTE — PROGRESS NOTES
Hospitalist Progress Note    NAME:   Piter Morales   : 1955   MRN: [id number]     Date/Time: 1/15/2025 11:31 PM  Patient PCP: Jenni Ortiz MD    Estimated discharge date:  Barriers:       Assessment / Plan:  On 1/15  Developed multiple loose bowel movements, possible viral versus medication side effect, started on Lomotil as needed, BMP tomorrow, for now manage conservatively  Hold Bumex for now    According to the patient diarrhea has been resolved, patient reported some blood on top of her stool, on exam patient has hemorrhoidal tags    Respiratory acidosis/metabolic alkalosis with altered mental status 1/10  - pH of 7.31, previous CO2 of 113, pO2 of 81 and bicarb of 57 consistent with respiratory acidosis and metabolic alkalosis (most likely secondary to hypoventilation syndrome as well as contraction alkalosis due to Bumex  - responded to  BiPAP  -Continue BiPAP overnight  - ABG today improvement    Rapid response was called around 6 PM on 01/10/2021  - Because the patient became unresponsive, 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 and he fell asleep we asked for pulmonary consult      Acute kidney injury  - Oliguric kidney injury, creatinine jumped to 3.09  - Possible contrast-induced nephropathy, drug reaction, ATN due to hypotension  - CT of abdomen and pelvis was done to evaluate for source of anasarca, given patient has no evidence of right or left heart failure on exam  - Consulted with nephrologist, started on Bumex drip, transferred to intermediate care unit, started on IV albumin  -- Resolved    New onset lower extremity/scrotal/abdominal edema possibly secondary to CHF exacerbation triggered by A-fib  -Started on Bumex drip per consulting nephrologist  -Significant improvement of lower extremity edema , scrotal edema and abdominal wall edema  - Echocardiogram during this admission showed preserved ejection fraction no significant valvular abnormality, as per

## 2025-01-16 NOTE — CARE COORDINATION
Care Management Initial Assessment       RUR: 15  Readmission? No  1st IM letter given? Yes   1st  letter given: No,       01/08/25 2120   Service Assessment   Patient Orientation Alert and Oriented   Cognition Alert   History Provided By Spouse   Primary Caregiver Self  (Spouse is not available ,she is  helping babysitting her grand children in [facility])   Support Systems Family Members;Spouse/Significant Other   Patient's Healthcare Decision Maker is: Legal Next of Kin   PCP Verified by CM Yes   Last Visit to PCP Within last 3 months   Prior Functional Level Independent in ADLs/IADLs  (Last 3 weeks pt was very lethergic and spend more time sleeping . Souse reported pt sleeps alot.)   Can patient return to prior living arrangement Yes   Ability to make needs known: Good   Family able to assist with home care needs: No   Financial Resources Medicaid;Other (Comment)   Social/Functional History   Lives With Spouse   Type of Home House   Home Layout One level   Home Access Stairs to enter with rails   Entrance Stairs - Number of Steps 4-5   Home Equipment Cane   Receives Help From Family   Prior Level of Assist for ADLs Independent   Prior Level of Assist for Homemaking Independent   Ambulation Assistance Independent   Prior Level of Assist for Transfers Independent   Active  Yes  (3 weeks agoo pt was driving)   Mode of Transportation Car   Occupation Retired   Discharge Planning   Type of Residence   (Pt had IPR experiance with [facility] -15 years ago after his Hip surgery)   Living Arrangements Spouse/Significant Other   Current Services Prior To Admission Other (Comment)  (Nebulizer treatment)   Patient expects to be discharged to: House  (Benefit from PT/OT assessment)   Services At/After Discharge   Mode of Transport at Discharge   (Family vs BLS)       Advance Care Planning     General Advance Care Planning (ACP) Conversation    Date of Conversation: 1/8/2025  Conducted with: Patient with 
  Transition of Care Plan:     RUR: 14%  Prior Level of Functioning: had been independent but was requiring more assistance PTA  Disposition: SNF - 2 facilities have accepted, tentative DC Monday  ADITHYA: 1/13  If SNF or IPR: Date FOC offered: 1/9  Date FOC received: 1/9  Accepting facility: [facility]  Date authorization started with reference number: no auth  Date authorization received and expires:   Follow up appointments:   DME needed: facility to provide  Transportation at discharge: BLS  IM/[facility] Medicare/[facility] letter given: sign at DC  Is patient a  and connected with VA?               If yes, was  transfer form completed and VA notified?   Caregiver Contact:     Eli Morales (Spouse)  [phone]       Discharge Caregiver contacted prior to discharge?   Care Conference needed?   Barriers to discharge: clinical improvement,  CM will continue to follow up for DC date and need for BIPAP at facility    11:10 talked with spouse on the phone. She would like for the doctor to call her to update her.  Kaila served Dr. Gómez. Mentioned in rounds he might be discharged tomorrow. I will call the SNF to check on bed   Availability.    12:37 pm have called facilities to see if they have a  bed for tomorrow and will follow up on this  English Kristopher SOLOMON CM   [phone]        
CM Note- [facility] working on a bed today. They will order the BIPAP and will let me know if this will be today   English Kristopher SOLOMON CM   [phone]  
CM attempted to speak with patient, patient stated he was with the nurse and asked that CM call later.      GROVER Reyna   Care Manager  Available via uStudio    
CM note: referrals sent to SNF  English Kristopher SOLOMON CM   [phone]  
CM unable to complete assessment at this time.  Current rapid response in order.  Pt will transition to step down unit.    PENELOPE Gonzalez   [phone]    
Transition of Care Plan:    RUR: 14%  Prior Level of Functioning: had been independent but was requiring more assistance PTA  Disposition: SNF - 2 facilities have accepted, tentative DC Monday  ADITHYA: 1/13  If SNF or IPR: Date FOC offered: 1/9  Date FOC received: 1/9  Accepting facility: [facility]  Date authorization started with reference number: no auth  Date authorization received and expires:   Follow up appointments:   DME needed: facility to provide  Transportation at discharge: S  /IMM Medicare/[facility] letter given: sign at DC  Is patient a [place] and connected with VA?    If yes, was [place] transfer form completed and VA notified?   Caregiver Contact:     KeyanaEli goins (Spouse)  [phone]      Discharge Caregiver contacted prior to discharge?   Care Conference needed?   Barriers to discharge: English Kristopher perez RN PS [phone]    
will become eligible within 180 days and UAI completed.   [] Provider/Patient and/or support system has requested screening.  [] UAI copy provided to patient or responsible party,   [] UAI unavailable at discharge will send once processed to SNF provider.  [] UAI unavailable at discharged mailed to patient  No:   [x] Private pay and is not financially eligible for Medicaid within the next 180 days.  [] Reside out-of-state.  [] A residents of a state owned/operated facility that is licensed  by Department of Behavioral Health and Developmental Services or [facility]  [] Enrollment in Medicaid hospice services  [] Non [place] citizen  [] Patient /Family declines to have screening completed or provide financial information for screening     Financial Resources:  Medicaid    [] Initiated and application pending   [] Full coverage     Advanced Care Plan:  []Surrogate Decision Maker of Care  []POA  [x]Communicated Code Status  \"Full\")    Other         Anyi Torres LMSW, LCSW  Care Management, [facility]  [phone]

## 2025-01-16 NOTE — DISCHARGE SUMMARY
Discharge Summary    Name: Piter Morales  [id number]  YOB: 1955 (Age: 69 y.o.)   Date of Admission: 1/4/2025  Date of Discharge: 1/16/2025  Attending Physician: Sumit Fowler MD    Discharge Diagnosis:   1-New onset lower extremity/scrotal/abdominal edema possibly secondary to diastolic CHF exacerbation triggered by A-fib   2-Respiratory acidosis/metabolic alkalosis with altered mental status on  1/10   3-contrast-induced acute kidney injury and nephropathy  4-contraction alkalosis secondary to diuretics  5-myoclonic jerks most likely secondary to CO2 retention/respiratory acidosis/metabolic contraction alkalosis  6-diarrhea possibly viral resolved  7-rectal bleeding possibly hemorrhoidal bleed  8-A-fib with RVR    Consultations:  IP CONSULT TO PHARMACY  IP CONSULT TO CARDIOLOGY  IP CONSULT TO NEPHROLOGY  IP CONSULT TO PULMONOLOGY      Brief Admission History/Reason for Admission Per Sumit Fowler MD:   Piter Morales is a 69 y.o.  male with PMHx significant for  Piter Morales is a 69 y.o. male w/ a-fib on eliquis , s/p 3x ablations, asthma, LUTS on doxazosin, and prostate cancer treated with ADT , recent termination of chronic steroid use, obesity, HTN, and asthma presenting for evaluation of leg swelling and itchiness for several weeks. Patient was recently taken off of 2 medications: prednisone 3 wks ago and an additional prostate cancer medication (patient cannot recall name) approx 5 wks ago. He notes approx 5 wks of worsening edema in BLE tracking up to his groin and abdomen gradually. He notes subsequently developing intense pruritus to the same area and has been itching the area, noting it feels dry and skin feels swollen. He notes burning pain in the bilateral lower extremities to area of most edema. He notes swelling in both testicles without pain and that his penis has been retracted secondary to the swelling to where he must sit to urinate. No

## 2025-01-16 NOTE — PLAN OF CARE
Problem: Safety - Adult  Goal: Free from fall injury  1/15/2025 2000 by Nhi Chakraborty RN  Outcome: Progressing  1/15/2025 1014 by Troy Liu RN  Outcome: Progressing     Problem: Skin/Tissue Integrity - Adult  Goal: Skin integrity remains intact  1/15/2025 1014 by Troy Liu RN  Outcome: Progressing  Flowsheets (Taken 1/15/2025 0821)  Skin Integrity Remains Intact: Monitor for areas of redness and/or skin breakdown     Problem: Occupational Therapy - Adult  Goal: By Discharge: Performs self-care activities at highest level of function for planned discharge setting.  See evaluation for individualized goals.  Description: FUNCTIONAL STATUS PRIOR TO ADMISSION:  The patient is a questionable historian but reports being independent with ADLs and functional mobility at baseline. He owns AD (canes, RW) but states he is not currently using them.  HOME SUPPORT: The patient lives with his wife and daughter.    Occupational Therapy Goals:  Initiated 1/9/2025  1.  Patient will perform grooming with Set-up seated in chair within 7 day(s).  2.  Patient will perform upper body dressing with Set-up within 7 day(s).  3.  Patient will perform lower body dressing with Moderate Assist within 7 day(s).  4.  Patient will perform toilet transfers with Minimal Assist  within 7 day(s).  5.  Patient will perform all aspects of toileting with Moderate Assist within 7 day(s).  6.  Patient will participate in upper extremity therapeutic exercise/activities with Supervision for 5 minutes within 7 day(s).      1/15/2025 1615 by Rosita Zavala, OT  Outcome: Progressing

## (undated) DEVICE — PINNACLE INTRODUCER SHEATH: Brand: PINNACLE

## (undated) DEVICE — TUBE SET IRR PUMP THERMALCOOL -- SMARTABLATE

## (undated) DEVICE — FORCEPS BX L240CM JAW DIA2.4MM ORNG L CAP W/ NDL DISP RAD

## (undated) DEVICE — 3M™ TEGADERM™ TRANSPARENT FILM DRESSING FRAME STYLE, 1626W, 4 IN X 4-3/4 IN (10 CM X 12 CM), 50/CT 4CT/CASE: Brand: 3M™ TEGADERM™

## (undated) DEVICE — PRESSURE MONITORING SET: Brand: TRUWAVE

## (undated) DEVICE — Device: Brand: NRG TRANSSEPTAL NEEDLE

## (undated) DEVICE — STERILE (15.2 TAPERED TO 7.6 X 183CM) POLYETHYLENE ACCORDION-FOLDED COVER FOR USE WITH SIEMENS ACUNAV ULTRASOUND CATHETER FAMILY CONNECTOR: Brand: SWIFTLINK TRANSDUCER COVER

## (undated) DEVICE — CABLE RMFG RSPONS ELEC EXT RED --

## (undated) DEVICE — MEDI-TRACE CADENCE ADULT, DEFIBRILLATION ELECTRODE -RTS  (10 PR/PK) - PHYSIO-CONTROL: Brand: MEDI-TRACE CADENCE

## (undated) DEVICE — PROBE ES TEMP HOT AND CLD FAST ACCURATE SFT FLX CIRCA S CATH

## (undated) DEVICE — CABLE CATH L10FT RED PIN CONN 34-34 FOR THERMOCOOL

## (undated) DEVICE — SHTH GUID 8.5F 22MM MED CRV -- CARTO VIZIGO

## (undated) DEVICE — CATHETER ABLAT 8FR L115CM 1-6-2MM SPC TIP 3.5MM FJ CRV

## (undated) DEVICE — CATH EP MAP 2-6-2 7FR F CRV -- PENTARAY

## (undated) DEVICE — CABLE CATH L10FT YEL CONN 12-12 PIN ELECTROGRAM CONDUCTION

## (undated) DEVICE — CATH ABLATN 10F 90CM F/SIEMENS -- SOUNDSTAR ECO

## (undated) DEVICE — IV START KIT: Brand: MEDLINE

## (undated) DEVICE — REM POLYHESIVE ADULT PATIENT RETURN ELECTRODE: Brand: VALLEYLAB

## (undated) DEVICE — TTL1LYR 14FR10ML 100%SILI UMST TR: Brand: MEDLINE

## (undated) DEVICE — PATCH CARTO 3 EXT REF --

## (undated) DEVICE — DRESSING HEMSTAT W3INXL2YD 1 PLY Z FLD QUIKCLOT CONTROL+

## (undated) DEVICE — CATHETER ULTRASOUND 10 FRX90 CM FOR CARTO 3 SOUNDSTAR ECO

## (undated) DEVICE — CATH EP CRV 7F DUO 2/8 2M LG -- LIVEWIRE STRL

## (undated) DEVICE — CABLE CONN TO CATH TO CARTO 3 --

## (undated) DEVICE — Device

## (undated) DEVICE — CABLE RMFG EXT CATH --

## (undated) DEVICE — HEART CATH-MRMC: Brand: MEDLINE INDUSTRIES, INC.

## (undated) DEVICE — CABLE EXT EP H/O/D BLK 150CM --

## (undated) DEVICE — CABLE CATH L10FT BLU CONN 34-34 PIN ELECTROGRAM CONDUCTION

## (undated) DEVICE — ENDOSCOPIC KIT COMPLIANCE ENDOKIT

## (undated) DEVICE — CATH DECANAV EP F CURVE 7FR --

## (undated) DEVICE — SET GRAV CK VLV NEEDLESS ST 3 GANGED 4WAY STPCOCK HI FLO 10

## (undated) DEVICE — CABLE RMFG CATH 34PIN ECO 2.7M

## (undated) DEVICE — CUFF BLD PRSS AD CLTH SGL TB W/ BAYNT CONN ROUNDED CORNER